# Patient Record
Sex: MALE | Race: BLACK OR AFRICAN AMERICAN | NOT HISPANIC OR LATINO | URBAN - METROPOLITAN AREA
[De-identification: names, ages, dates, MRNs, and addresses within clinical notes are randomized per-mention and may not be internally consistent; named-entity substitution may affect disease eponyms.]

---

## 2014-03-31 RX ORDER — TRAZODONE HCL 50 MG
1 TABLET ORAL
Qty: 0 | Refills: 0 | COMMUNITY
Start: 2014-03-31

## 2018-09-05 ENCOUNTER — EMERGENCY (EMERGENCY)
Facility: HOSPITAL | Age: 58
LOS: 1 days | Discharge: HOME | End: 2018-09-05
Attending: EMERGENCY MEDICINE
Payer: MEDICARE

## 2018-09-05 VITALS
TEMPERATURE: 98 F | HEIGHT: 75 IN | OXYGEN SATURATION: 100 % | HEART RATE: 87 BPM | DIASTOLIC BLOOD PRESSURE: 65 MMHG | WEIGHT: 190.04 LBS | SYSTOLIC BLOOD PRESSURE: 139 MMHG | RESPIRATION RATE: 18 BRPM

## 2018-09-05 VITALS
OXYGEN SATURATION: 100 % | HEART RATE: 62 BPM | SYSTOLIC BLOOD PRESSURE: 126 MMHG | DIASTOLIC BLOOD PRESSURE: 65 MMHG | RESPIRATION RATE: 16 BRPM

## 2018-09-05 DIAGNOSIS — F41.0 PANIC DISORDER [EPISODIC PAROXYSMAL ANXIETY]: ICD-10-CM

## 2018-09-05 DIAGNOSIS — Z21 ASYMPTOMATIC HUMAN IMMUNODEFICIENCY VIRUS [HIV] INFECTION STATUS: ICD-10-CM

## 2018-09-05 DIAGNOSIS — E11.9 TYPE 2 DIABETES MELLITUS WITHOUT COMPLICATIONS: ICD-10-CM

## 2018-09-05 DIAGNOSIS — R07.9 CHEST PAIN, UNSPECIFIED: ICD-10-CM

## 2018-09-05 DIAGNOSIS — F14.19 COCAINE ABUSE WITH UNSPECIFIED COCAINE-INDUCED DISORDER: ICD-10-CM

## 2018-09-05 DIAGNOSIS — F31.9 BIPOLAR DISORDER, UNSPECIFIED: ICD-10-CM

## 2018-09-05 DIAGNOSIS — I48.0 PAROXYSMAL ATRIAL FIBRILLATION: ICD-10-CM

## 2018-09-05 LAB
ALBUMIN SERPL ELPH-MCNC: 3.3 G/DL — SIGNIFICANT CHANGE UP (ref 3.3–5)
ALP SERPL-CCNC: 42 U/L — SIGNIFICANT CHANGE UP (ref 40–120)
ALT FLD-CCNC: 24 U/L — SIGNIFICANT CHANGE UP (ref 12–78)
ANION GAP SERPL CALC-SCNC: 10 MMOL/L — SIGNIFICANT CHANGE UP (ref 5–17)
APTT BLD: 27 SEC — LOW (ref 27.5–37.4)
AST SERPL-CCNC: 12 U/L — LOW (ref 15–37)
BASOPHILS # BLD AUTO: 0.02 K/UL — SIGNIFICANT CHANGE UP (ref 0–0.2)
BASOPHILS NFR BLD AUTO: 0.4 % — SIGNIFICANT CHANGE UP (ref 0–2)
BILIRUB SERPL-MCNC: 0.3 MG/DL — SIGNIFICANT CHANGE UP (ref 0.2–1.2)
BUN SERPL-MCNC: 18 MG/DL — SIGNIFICANT CHANGE UP (ref 7–23)
CALCIUM SERPL-MCNC: 9 MG/DL — SIGNIFICANT CHANGE UP (ref 8.5–10.1)
CHLORIDE SERPL-SCNC: 110 MMOL/L — HIGH (ref 96–108)
CK MB CFR SERPL CALC: 1.3 NG/ML — SIGNIFICANT CHANGE UP (ref 0.5–3.6)
CO2 SERPL-SCNC: 25 MMOL/L — SIGNIFICANT CHANGE UP (ref 22–31)
CREAT SERPL-MCNC: 0.8 MG/DL — SIGNIFICANT CHANGE UP (ref 0.5–1.3)
EOSINOPHIL # BLD AUTO: 0.01 K/UL — SIGNIFICANT CHANGE UP (ref 0–0.5)
EOSINOPHIL NFR BLD AUTO: 0.2 % — SIGNIFICANT CHANGE UP (ref 0–6)
ETHANOL SERPL-MCNC: <10 MG/DL — SIGNIFICANT CHANGE UP (ref 0–10)
GLUCOSE SERPL-MCNC: 92 MG/DL — SIGNIFICANT CHANGE UP (ref 70–99)
HCT VFR BLD CALC: 36 % — LOW (ref 39–50)
HGB BLD-MCNC: 12.2 G/DL — LOW (ref 13–17)
IMM GRANULOCYTES NFR BLD AUTO: 0.4 % — SIGNIFICANT CHANGE UP (ref 0–1.5)
INR BLD: 1.09 RATIO — SIGNIFICANT CHANGE UP (ref 0.88–1.16)
LYMPHOCYTES # BLD AUTO: 1.47 K/UL — SIGNIFICANT CHANGE UP (ref 1–3.3)
LYMPHOCYTES # BLD AUTO: 26.2 % — SIGNIFICANT CHANGE UP (ref 13–44)
MCHC RBC-ENTMCNC: 30 PG — SIGNIFICANT CHANGE UP (ref 27–34)
MCHC RBC-ENTMCNC: 33.9 GM/DL — SIGNIFICANT CHANGE UP (ref 32–36)
MCV RBC AUTO: 88.5 FL — SIGNIFICANT CHANGE UP (ref 80–100)
MONOCYTES # BLD AUTO: 0.69 K/UL — SIGNIFICANT CHANGE UP (ref 0–0.9)
MONOCYTES NFR BLD AUTO: 12.3 % — SIGNIFICANT CHANGE UP (ref 2–14)
NEUTROPHILS # BLD AUTO: 3.4 K/UL — SIGNIFICANT CHANGE UP (ref 1.8–7.4)
NEUTROPHILS NFR BLD AUTO: 60.5 % — SIGNIFICANT CHANGE UP (ref 43–77)
PCP SPEC-MCNC: SIGNIFICANT CHANGE UP
PLATELET # BLD AUTO: 191 K/UL — SIGNIFICANT CHANGE UP (ref 150–400)
POTASSIUM SERPL-MCNC: 3.8 MMOL/L — SIGNIFICANT CHANGE UP (ref 3.5–5.3)
POTASSIUM SERPL-SCNC: 3.8 MMOL/L — SIGNIFICANT CHANGE UP (ref 3.5–5.3)
PROT SERPL-MCNC: 7.1 GM/DL — SIGNIFICANT CHANGE UP (ref 6–8.3)
PROTHROM AB SERPL-ACNC: 11.9 SEC — SIGNIFICANT CHANGE UP (ref 9.8–12.7)
RBC # BLD: 4.07 M/UL — LOW (ref 4.2–5.8)
RBC # FLD: 16.1 % — HIGH (ref 10.3–14.5)
SODIUM SERPL-SCNC: 145 MMOL/L — SIGNIFICANT CHANGE UP (ref 135–145)
TROPONIN I SERPL-MCNC: <.015 NG/ML — SIGNIFICANT CHANGE UP (ref 0.01–0.04)
TROPONIN I SERPL-MCNC: <.015 NG/ML — SIGNIFICANT CHANGE UP (ref 0.01–0.04)
WBC # BLD: 5.61 K/UL — SIGNIFICANT CHANGE UP (ref 3.8–10.5)
WBC # FLD AUTO: 5.61 K/UL — SIGNIFICANT CHANGE UP (ref 3.8–10.5)

## 2018-09-05 PROCEDURE — 99285 EMERGENCY DEPT VISIT HI MDM: CPT

## 2018-09-05 PROCEDURE — 71045 X-RAY EXAM CHEST 1 VIEW: CPT | Mod: 26

## 2018-09-05 PROCEDURE — 93010 ELECTROCARDIOGRAM REPORT: CPT

## 2018-09-05 RX ORDER — IPRATROPIUM/ALBUTEROL SULFATE 18-103MCG
3 AEROSOL WITH ADAPTER (GRAM) INHALATION ONCE
Qty: 0 | Refills: 0 | Status: COMPLETED | OUTPATIENT
Start: 2018-09-05 | End: 2018-09-05

## 2018-09-05 RX ADMIN — Medication 1 MILLIGRAM(S): at 03:39

## 2018-09-05 RX ADMIN — Medication 3 MILLILITER(S): at 03:39

## 2018-09-05 NOTE — ED ADULT NURSE REASSESSMENT NOTE - NS ED NURSE REASSESS COMMENT FT1
Patient remains stable while in the ED, pending repeat troponin results. Denies any cp/sob at the moment. VS wnl.
Patient remains stable while in ED, sleeping in strtecher and hooked on CM. VS wnl. Repeat tropes in 6am.
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
## 2018-09-05 NOTE — ED PROVIDER NOTE - MEDICAL DECISION MAKING DETAILS
smoked crack, had some cp/sob. comfortable appearing with ekg showing only rbbb. mild paranoia but baseline per aunt. no si/hi/ah or threat to self. ekg, CE, benzos, reassess.

## 2018-09-05 NOTE — ED ADULT NURSE NOTE - NSIMPLEMENTINTERV_GEN_ALL_ED
Implemented All Universal Safety Interventions:  [place] to call system. Call bell, personal items and telephone within reach. Instruct patient to call for assistance. Room bathroom lighting operational. Non-slip footwear when patient is off stretcher. Physically safe environment: no spills, clutter or unnecessary equipment. Stretcher in lowest position, wheels locked, appropriate side rails in place.

## 2018-09-05 NOTE — ED PROVIDER NOTE - PHYSICAL EXAMINATION
Gen: No acute distress, non toxic  HEENT: Mucous membranes moist, pink conjunctivae, EOMI  CV: RRR, nl s1/s2. equal  pulses b/l  Resp: CTAB, normal rate and effort  GI: Abdomen soft, NT, ND. No rebound, no guarding  : No CVAT  Neuro: A&O x 3, moving all 4 extremities  MSK: No spine or joint tenderness to palpation  Skin: No rashes. intact and perfused.

## 2018-09-05 NOTE — ED PROVIDER NOTE - PROGRESS NOTE DETAILS
Mackenzie: pt sleeping comfortably. Mackenzie: woke pt up as was sleeping comfortably, CE x2, neg, feeling better,labs wnl. given return precautions and instructions. stable for d/c. VS wnl.
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
## 2018-09-05 NOTE — ED ADULT NURSE NOTE - PMH
Allergy    Bipolar 1 disorder    DM (diabetes mellitus)    HIV disease    PAF (paroxysmal atrial fibrillation)

## 2018-09-23 ENCOUNTER — EMERGENCY (EMERGENCY)
Facility: HOSPITAL | Age: 58
LOS: 1 days | Discharge: PSYCHIATRIC OR SUBSTANCE USE FACILITY | End: 2018-09-23
Admitting: EMERGENCY MEDICINE
Payer: MEDICARE

## 2018-09-23 VITALS
RESPIRATION RATE: 20 BRPM | SYSTOLIC BLOOD PRESSURE: 144 MMHG | HEART RATE: 93 BPM | OXYGEN SATURATION: 98 % | TEMPERATURE: 98 F | DIASTOLIC BLOOD PRESSURE: 96 MMHG

## 2018-09-23 DIAGNOSIS — F14.21 COCAINE DEPENDENCE, IN REMISSION: ICD-10-CM

## 2018-09-23 DIAGNOSIS — R69 ILLNESS, UNSPECIFIED: ICD-10-CM

## 2018-09-23 DIAGNOSIS — F25.9 SCHIZOAFFECTIVE DISORDER, UNSPECIFIED: ICD-10-CM

## 2018-09-23 LAB
ALBUMIN SERPL ELPH-MCNC: 4.1 G/DL — SIGNIFICANT CHANGE UP (ref 3.3–5)
ALP SERPL-CCNC: 52 U/L — SIGNIFICANT CHANGE UP (ref 40–120)
ALT FLD-CCNC: 18 U/L — SIGNIFICANT CHANGE UP (ref 4–41)
AMPHET UR-MCNC: NEGATIVE — SIGNIFICANT CHANGE UP
APAP SERPL-MCNC: < 15 UG/ML — LOW (ref 15–25)
APPEARANCE UR: CLEAR — SIGNIFICANT CHANGE UP
AST SERPL-CCNC: 16 U/L — SIGNIFICANT CHANGE UP (ref 4–40)
BACTERIA # UR AUTO: HIGH
BARBITURATES UR SCN-MCNC: NEGATIVE — SIGNIFICANT CHANGE UP
BASOPHILS # BLD AUTO: 0.01 K/UL — SIGNIFICANT CHANGE UP (ref 0–0.2)
BASOPHILS NFR BLD AUTO: 0.2 % — SIGNIFICANT CHANGE UP (ref 0–2)
BENZODIAZ UR-MCNC: POSITIVE — SIGNIFICANT CHANGE UP
BILIRUB SERPL-MCNC: 0.3 MG/DL — SIGNIFICANT CHANGE UP (ref 0.2–1.2)
BILIRUB UR-MCNC: NEGATIVE — SIGNIFICANT CHANGE UP
BLOOD UR QL VISUAL: NEGATIVE — SIGNIFICANT CHANGE UP
BUN SERPL-MCNC: 13 MG/DL — SIGNIFICANT CHANGE UP (ref 7–23)
CALCIUM SERPL-MCNC: 9.5 MG/DL — SIGNIFICANT CHANGE UP (ref 8.4–10.5)
CANNABINOIDS UR-MCNC: NEGATIVE — SIGNIFICANT CHANGE UP
CHLORIDE SERPL-SCNC: 99 MMOL/L — SIGNIFICANT CHANGE UP (ref 98–107)
CO2 SERPL-SCNC: 26 MMOL/L — SIGNIFICANT CHANGE UP (ref 22–31)
COCAINE METAB.OTHER UR-MCNC: NEGATIVE — SIGNIFICANT CHANGE UP
COLOR SPEC: YELLOW — SIGNIFICANT CHANGE UP
CREAT SERPL-MCNC: 0.79 MG/DL — SIGNIFICANT CHANGE UP (ref 0.5–1.3)
EOSINOPHIL # BLD AUTO: 0.01 K/UL — SIGNIFICANT CHANGE UP (ref 0–0.5)
EOSINOPHIL NFR BLD AUTO: 0.2 % — SIGNIFICANT CHANGE UP (ref 0–6)
ETHANOL BLD-MCNC: < 10 MG/DL — SIGNIFICANT CHANGE UP
GLUCOSE SERPL-MCNC: 101 MG/DL — HIGH (ref 70–99)
GLUCOSE UR-MCNC: NEGATIVE — SIGNIFICANT CHANGE UP
HCT VFR BLD CALC: 38.2 % — LOW (ref 39–50)
HGB BLD-MCNC: 13 G/DL — SIGNIFICANT CHANGE UP (ref 13–17)
HYALINE CASTS # UR AUTO: SIGNIFICANT CHANGE UP
IMM GRANULOCYTES # BLD AUTO: 0.01 # — SIGNIFICANT CHANGE UP
IMM GRANULOCYTES NFR BLD AUTO: 0.2 % — SIGNIFICANT CHANGE UP (ref 0–1.5)
KETONES UR-MCNC: NEGATIVE — SIGNIFICANT CHANGE UP
LEUKOCYTE ESTERASE UR-ACNC: SIGNIFICANT CHANGE UP
LYMPHOCYTES # BLD AUTO: 1.24 K/UL — SIGNIFICANT CHANGE UP (ref 1–3.3)
LYMPHOCYTES # BLD AUTO: 27.6 % — SIGNIFICANT CHANGE UP (ref 13–44)
MCHC RBC-ENTMCNC: 30 PG — SIGNIFICANT CHANGE UP (ref 27–34)
MCHC RBC-ENTMCNC: 34 % — SIGNIFICANT CHANGE UP (ref 32–36)
MCV RBC AUTO: 88.2 FL — SIGNIFICANT CHANGE UP (ref 80–100)
METHADONE UR-MCNC: NEGATIVE — SIGNIFICANT CHANGE UP
MONOCYTES # BLD AUTO: 0.39 K/UL — SIGNIFICANT CHANGE UP (ref 0–0.9)
MONOCYTES NFR BLD AUTO: 8.7 % — SIGNIFICANT CHANGE UP (ref 2–14)
NEUTROPHILS # BLD AUTO: 2.83 K/UL — SIGNIFICANT CHANGE UP (ref 1.8–7.4)
NEUTROPHILS NFR BLD AUTO: 63.1 % — SIGNIFICANT CHANGE UP (ref 43–77)
NITRITE UR-MCNC: POSITIVE — HIGH
NRBC # FLD: 0 — SIGNIFICANT CHANGE UP
OPIATES UR-MCNC: NEGATIVE — SIGNIFICANT CHANGE UP
OXYCODONE UR-MCNC: NEGATIVE — SIGNIFICANT CHANGE UP
PCP UR-MCNC: NEGATIVE — SIGNIFICANT CHANGE UP
PH UR: 6.5 — SIGNIFICANT CHANGE UP (ref 5–8)
PLATELET # BLD AUTO: 140 K/UL — LOW (ref 150–400)
PMV BLD: 10.8 FL — SIGNIFICANT CHANGE UP (ref 7–13)
POTASSIUM SERPL-MCNC: 4 MMOL/L — SIGNIFICANT CHANGE UP (ref 3.5–5.3)
POTASSIUM SERPL-SCNC: 4 MMOL/L — SIGNIFICANT CHANGE UP (ref 3.5–5.3)
PROT SERPL-MCNC: 7.7 G/DL — SIGNIFICANT CHANGE UP (ref 6–8.3)
PROT UR-MCNC: 10 — SIGNIFICANT CHANGE UP
RBC # BLD: 4.33 M/UL — SIGNIFICANT CHANGE UP (ref 4.2–5.8)
RBC # FLD: 15.1 % — HIGH (ref 10.3–14.5)
RBC CASTS # UR COMP ASSIST: SIGNIFICANT CHANGE UP (ref 0–?)
SALICYLATES SERPL-MCNC: < 5 MG/DL — LOW (ref 15–30)
SODIUM SERPL-SCNC: 138 MMOL/L — SIGNIFICANT CHANGE UP (ref 135–145)
SP GR SPEC: 1.02 — SIGNIFICANT CHANGE UP (ref 1–1.04)
SQUAMOUS # UR AUTO: SIGNIFICANT CHANGE UP
TSH SERPL-MCNC: 1.71 UIU/ML — SIGNIFICANT CHANGE UP (ref 0.27–4.2)
UROBILINOGEN FLD QL: NORMAL — SIGNIFICANT CHANGE UP
WBC # BLD: 4.49 K/UL — SIGNIFICANT CHANGE UP (ref 3.8–10.5)
WBC # FLD AUTO: 4.49 K/UL — SIGNIFICANT CHANGE UP (ref 3.8–10.5)
WBC UR QL: HIGH (ref 0–?)

## 2018-09-23 PROCEDURE — 70450 CT HEAD/BRAIN W/O DYE: CPT | Mod: 26

## 2018-09-23 PROCEDURE — 99285 EMERGENCY DEPT VISIT HI MDM: CPT | Mod: 25

## 2018-09-23 PROCEDURE — 93010 ELECTROCARDIOGRAM REPORT: CPT

## 2018-09-23 PROCEDURE — 99285 EMERGENCY DEPT VISIT HI MDM: CPT

## 2018-09-23 RX ORDER — CEPHALEXIN 500 MG
500 CAPSULE ORAL ONCE
Qty: 0 | Refills: 0 | Status: COMPLETED | OUTPATIENT
Start: 2018-09-23 | End: 2018-09-23

## 2018-09-23 RX ORDER — DIAZEPAM 5 MG
5 TABLET ORAL ONCE
Qty: 0 | Refills: 0 | Status: DISCONTINUED | OUTPATIENT
Start: 2018-09-23 | End: 2018-09-23

## 2018-09-23 RX ADMIN — Medication 500 MILLIGRAM(S): at 23:32

## 2018-09-23 RX ADMIN — Medication 5 MILLIGRAM(S): at 22:53

## 2018-09-23 NOTE — ED PROVIDER NOTE - MEDICAL DECISION MAKING DETAILS
56 y/o  M hx Paranoid Schizophrenia, HIV (20 years)   Labs, Urine Tox/UA, EKG, CT -Head, T Cell Subset. Leuks plus nitrite and excess WBC on  UA. Dx UTI- Will cover with Keflex 500 mg BID x 10 days .  Medical evaluation performed. There is no clinical evidence of intoxication or any acute medical problem requiring immediate intervention. Psychiatric consultation called, recommend inpatient psychiatric admission. 58 y/o  M hx Paranoid Schizophrenia, HIV (20 years),HLD,HTN, DM, A.Fib.    Labs, Urine Tox/UA, EKG - NSR-  CT -Head, T Cell Subset. Leuks plus nitrite and excess WBC on  UA.   Dx -UTI- Will cover with Keflex 500 mg BID x 10 days .  Medical evaluation performed. There is no clinical evidence of intoxication or any acute medical problem requiring immediate intervention. Psychiatric consultation called, recommend inpatient psychiatric admission.

## 2018-09-23 NOTE — ED BEHAVIORAL HEALTH ASSESSMENT NOTE - DETAILS
20 yrs ago tried to self strangulate possible dyskinesia from risperidone and other AP's Dr. Gan pt,
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
## 2018-09-23 NOTE — ED PROVIDER NOTE - PMH
Deep vein thrombosis of both lower extremities    Paranoid schizophrenia Deep vein thrombosis of both lower extremities    HIV (human immunodeficiency virus infection)    Paranoid schizophrenia Afib    Deep vein thrombosis of both lower extremities    DM (diabetes mellitus)    HIV (human immunodeficiency virus infection)    HLD (hyperlipidemia)    HTN (hypertension)    Paranoid schizophrenia

## 2018-09-23 NOTE — ED PROVIDER NOTE - OBJECTIVE STATEMENT
58 y/o  M hx Paranoid Schizophrenia, HIV (20 years) BIBA w c/o that he's has concerns that his family want to kill him. States  ' I heard them saying that no blood should be in the house, so we will kill hematoside" . Patient  appears paranoid  with flight of ideas. Admits to medication compliance. Denies falling, punching or kicking any objects.  Denies SI/HI/AH/VH. Denies pain, dizziness, SOB, fever, chills ,chest/ abdominal discomfort. Denies recent use of  alcohol. 56 y/o  M hx Paranoid Schizophrenia, HIV (20 years), HLD,HTN, DM, A.Fib. BIBA w c/o that he's has concerns that his family want to kill him. States  ' I heard them saying that no blood should be in the house, so we will kill hIm Outside" . Admits to medication compliance.  Patient  appears paranoid  with flight of ideas. Admits to medication compliance. Denies falling, punching or kicking any objects.  Denies SI/HI/AH/VH. Denies pain, dizziness, SOB, fever, chills ,chest/ abdominal discomfort. Denies recent use of  alcohol. 56 y/o  M hx Paranoid Schizophrenia, HIV (20 years), HLD,HTN, DM, A.Fib. BIBA w c/o that he's has concerns that his family want to kill him. States  ' I heard them saying that no blood should be in the house, so we will kill hIm Outside" . Admits to medication compliance.  Patient  appears paranoid  with flight of ideas. Admits to medication compliance. Denies falling, punching or kicking any objects.  Denies SI/HI/AH/VH. Denies pain, dizziness, SOB, fever, chills ,chest/ abdominal discomfort. Denies recent use of  alcohol or illicit drugs. 58 y/o  M hx Paranoid Schizophrenia, HIV (20 years), HLD,HTN, DM, A.Fib. BIBA w c/o that he's has concerns that his family want to kill him. States  ' I heard them saying that no blood should be in the house, so we will kill him Outside" . Admits to medication compliance.  Patient  appears paranoid  with flight of ideas. Admits to medication compliance. Denies falling, punching or kicking any objects.  Denies SI/HI/AH/VH. Denies pain, dizziness, SOB, fever, chills ,chest/ abdominal discomfort. Denies recent use of  alcohol or illicit drugs.
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
## 2018-09-23 NOTE — ED PROVIDER NOTE - CARE PLAN
Principal Discharge DX:	Schizoaffective disorder  Secondary Diagnosis:	Cocaine use disorder, moderate, in sustained remission Principal Discharge DX:	Schizoaffective disorder  Secondary Diagnosis:	Cocaine use disorder, moderate, in sustained remission  Secondary Diagnosis:	UTI (urinary tract infection)

## 2018-09-23 NOTE — ED BEHAVIORAL HEALTH ASSESSMENT NOTE - RISK ASSESSMENT
Patient is acute risk to self given high levels of anxiety, psychosis, unable to engage in safe discharge planning, He poses and imminent danger to self as he is unable to care for self secondary to acute psychosis. Pt has chronic risks given chronic mental illness, past psych hospitalizations, past SA, and HIV and acute risk is currently elevated from baseline. He requires inpatient psychiatric admission for treatment and stabilization.

## 2018-09-23 NOTE — ED BEHAVIORAL HEALTH ASSESSMENT NOTE - CURRENT MEDICATION
Abilify 10mg qd, diazepam 5mg tid, Depakote ER 750mg qhs, trazodone 50mg prn  HIV- Genvoya 150mg qd, HLD-lipitor 10mg hs, DM2 : metoformin 500mg bid, HTN metoprolol 25mg qd, DVT prophylaxis-aspirin 81mg, plavix, oxybutinin 10mg qd, Tamulosin 0.4mg qd, gabapentin 600mg tid

## 2018-09-23 NOTE — ED BEHAVIORAL HEALTH ASSESSMENT NOTE - SUMMARY
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 Robles [facility]; remote hx of attempted self strangulation; PMH HIV, DVT, atrial fibrillation, DM2, HTN, HLD BIB EMS after he [place] [employer] [address] as he was afraid that family members are coming to kill him.  On exam, pt is cooperative, makes good eye contact, he is highly anxious, though processes are linear and goal directed, he endorses persecutory delusions that his family is trying to kill him, he also endorses delusions of carbon monoxide poisoning while at work last week. Patient is unable to safety plan. Dx in keeping with acute psychosis, schizoaffective disorder. Patient is acute risk to self given high levels of anxiety, psychosis, unable to engage in safe discharge planning, He poses and imminent danger to self as he is unable to care for self secondary to acute psychosis. Pt has chronic risks given chronic mental illness, past psych hospitalizations, past SA, and HIV and acute risk is currently elevated from baseline. He requires inpatient psychiatric admission for treatment and stabilization. Pt made aware  no beds in [facility], will transfer to [facility] on 9.13. No need for 1:1 CO as denies active suicidal or homicidal intent. 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 Robles [facility]; remote hx of attempted self strangulation; PMH HIV, DVT, atrial fibrillation, DM2, HTN, HLD BIB EMS after he activated [place] [address] as he was afraid that family members are coming to kill him.  On exam, pt is cooperative, makes good eye contact, he is highly anxious, though processes are linear and goal directed, he endorses persecutory delusions that his family is trying to kill him, he also endorses delusions of carbon monoxide poisoning while at work last week. Patient is unable to safety plan. Dx in keeping with acute psychosis, schizoaffective disorder. UTOX is negative. Patient is acute risk to self given high levels of anxiety, psychosis, unable to engage in safe discharge planning, He poses and imminent danger to self as he is unable to care for self secondary to acute psychosis. Pt has chronic risks given chronic mental illness, past psych hospitalizations, past SA, and HIV and acute risk is currently elevated from baseline. He requires inpatient psychiatric admission for treatment and stabilization. Pt made aware  no beds in [facility], will transfer to [facility] on 9.13. No need for 1:1 CO as denies active suicidal or homicidal intent.
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
## 2018-09-23 NOTE — ED ADULT NURSE NOTE - NSIMPLEMENTINTERV_GEN_ALL_ED
Implemented All Universal Safety Interventions:  [place] to call system. Call bell, personal items and telephone within reach. Instruct patient to call for assistance. Room bathroom lighting operational. Non-slip footwear when patient is off stretcher. Physically safe environment: no spills, clutter or unnecessary equipment. Stretcher in lowest position, wheels locked, appropriate side rails in place.

## 2018-09-23 NOTE — ED BEHAVIORAL HEALTH ASSESSMENT NOTE - PSYCHIATRIC ISSUES AND PLAN (INCLUDE STANDING AND PRN MEDICATION)
continue Abilify 10mg qd, (primary team to consider increase dose), diazepam 5mg tid, Depakote ER 750mg qhs, trazodone 50mg prn, prn's

## 2018-09-23 NOTE — ED ADULT NURSE NOTE - ED STAT RN HANDOFF DETAILS
Report given to [address] MELLISA Velazco.  EMS called for transport.  EMS arrived and transporting pt.

## 2018-09-23 NOTE — ED BEHAVIORAL HEALTH ASSESSMENT NOTE - MEDICAL ISSUES AND PLAN (INCLUDE STANDING AND PRN MEDICATION)
HIV- Genvoya 150mg qd, HLD-lipitor 10mg hs, DM2 : metoformin 500mg bid, HTN metoprolol 25mg qd, DVT prophylaxis-aspirin 81mg, plavix, oxybutinin 10mg qd, Tamulosin 0.4mg qd, gabapentin 600mg tid UTI diagnosed today in ED-KEFLEX 500MG BID X 10 DAYS, HIV- Genvoya 150mg qd, HLD-lipitor 10mg hs, DM2 : metoformin 500mg bid, HTN metoprolol 25mg qd, DVT prophylaxis-aspirin 81mg, plavix, oxybutinin 10mg qd, Tamulosin 0.4mg qd, gabapentin 600mg tid

## 2018-09-23 NOTE — ED BEHAVIORAL HEALTH ASSESSMENT NOTE - SUICIDE PROTECTIVE FACTORS
Future oriented/Fear of death or dying due to pain/suffering/High spirituality/Identifies reasons for living

## 2018-09-23 NOTE — ED BEHAVIORAL HEALTH ASSESSMENT NOTE - DESCRIPTION
cooperative, anxious  ICU Vital Signs Last 24 Hrs  T(C): 36.7 (23 Sep 2018 20:33), Max: 36.7 (23 Sep 2018 20:33)  T(F): 98.1 (23 Sep 2018 20:33), Max: 98.1 (23 Sep 2018 20:33)  HR: 93 (23 Sep 2018 20:33) (93 - 93)  BP: 144/96 (23 Sep 2018 20:33) (144/96 - 144/96)  BP(mean): --  ABP: --  ABP(mean): --  RR: 20 (23 Sep 2018 20:33) (20 - 20)  SpO2: 98% (23 Sep 2018 20:33) (98% - 98%) HIV, HTN, HLD, DVT'S Afib part-time employed, will be getting his own apartment through [facility] on 10/4

## 2018-09-24 ENCOUNTER — INPATIENT (INPATIENT)
Facility: HOSPITAL | Age: 58
LOS: 34 days | Discharge: HOME | End: 2018-10-29
Attending: PSYCHIATRY & NEUROLOGY | Admitting: PSYCHIATRY & NEUROLOGY
Payer: MEDICARE

## 2018-09-24 VITALS
WEIGHT: 164.24 LBS | SYSTOLIC BLOOD PRESSURE: 111 MMHG | DIASTOLIC BLOOD PRESSURE: 83 MMHG | HEIGHT: 75 IN | TEMPERATURE: 97 F | RESPIRATION RATE: 18 BRPM | HEART RATE: 83 BPM

## 2018-09-24 VITALS
OXYGEN SATURATION: 100 % | TEMPERATURE: 99 F | SYSTOLIC BLOOD PRESSURE: 118 MMHG | DIASTOLIC BLOOD PRESSURE: 75 MMHG | HEART RATE: 79 BPM | RESPIRATION RATE: 16 BRPM

## 2018-09-24 DIAGNOSIS — E11.9 TYPE 2 DIABETES MELLITUS WITHOUT COMPLICATIONS: ICD-10-CM

## 2018-09-24 DIAGNOSIS — B20 HUMAN IMMUNODEFICIENCY VIRUS [HIV] DISEASE: ICD-10-CM

## 2018-09-24 DIAGNOSIS — F31.9 BIPOLAR DISORDER, UNSPECIFIED: ICD-10-CM

## 2018-09-24 DIAGNOSIS — F20.9 SCHIZOPHRENIA, UNSPECIFIED: ICD-10-CM

## 2018-09-24 DIAGNOSIS — F14.21 COCAINE DEPENDENCE, IN REMISSION: ICD-10-CM

## 2018-09-24 DIAGNOSIS — F25.9 SCHIZOAFFECTIVE DISORDER, UNSPECIFIED: ICD-10-CM

## 2018-09-24 LAB
4/8 RATIO: 1.57 CELLS/UL — LOW (ref 1.69–2.84)
ABS CD8: 309 CELLS/UL — SIGNIFICANT CHANGE UP (ref 291–576)
CD16+CD56+ CELLS NFR BLD: 10 % — SIGNIFICANT CHANGE UP (ref 6–22)
CD16+CD56+ CELLS NFR SPEC: 117 CELL/UL — SIGNIFICANT CHANGE UP (ref 59–453)
CD19 BLASTS SPEC-ACNC: 17 % — SIGNIFICANT CHANGE UP (ref 8–22)
CD19 BLASTS SPEC-ACNC: 189 CELL/UL — SIGNIFICANT CHANGE UP (ref 105–430)
CD3 BLASTS SPEC-ACNC: 72 % — SIGNIFICANT CHANGE UP (ref 62–83)
CD3 BLASTS SPEC-ACNC: 821 CELLS/UL — SIGNIFICANT CHANGE UP (ref 678–2144)
CD4 %: 43 % — SIGNIFICANT CHANGE UP (ref 43–52)
CD8 %: 27 % — SIGNIFICANT CHANGE UP (ref 17–28)
T-CELL CD4 SUBSET PNL BLD: 484 CELL/UL — SIGNIFICANT CHANGE UP (ref 431–1434)

## 2018-09-24 RX ORDER — VALACYCLOVIR 500 MG/1
1000 TABLET, FILM COATED ORAL DAILY
Qty: 0 | Refills: 0 | Status: COMPLETED | OUTPATIENT
Start: 2018-09-24 | End: 2018-10-24

## 2018-09-24 RX ORDER — OXYBUTYNIN CHLORIDE 5 MG
10 TABLET ORAL DAILY
Qty: 0 | Refills: 0 | Status: DISCONTINUED | OUTPATIENT
Start: 2018-09-24 | End: 2018-09-24

## 2018-09-24 RX ORDER — METFORMIN HYDROCHLORIDE 850 MG/1
500 TABLET ORAL
Qty: 0 | Refills: 0 | Status: DISCONTINUED | OUTPATIENT
Start: 2018-09-24 | End: 2018-10-29

## 2018-09-24 RX ORDER — GABAPENTIN 400 MG/1
600 CAPSULE ORAL THREE TIMES A DAY
Qty: 0 | Refills: 0 | Status: DISCONTINUED | OUTPATIENT
Start: 2018-09-24 | End: 2018-10-29

## 2018-09-24 RX ORDER — DIVALPROEX SODIUM 500 MG/1
750 TABLET, DELAYED RELEASE ORAL AT BEDTIME
Qty: 0 | Refills: 0 | Status: DISCONTINUED | OUTPATIENT
Start: 2018-09-24 | End: 2018-10-03

## 2018-09-24 RX ORDER — TAMSULOSIN HYDROCHLORIDE 0.4 MG/1
0.4 CAPSULE ORAL AT BEDTIME
Qty: 0 | Refills: 0 | Status: DISCONTINUED | OUTPATIENT
Start: 2018-09-24 | End: 2018-10-29

## 2018-09-24 RX ORDER — KETOTIFEN FUMARATE 0.34 MG/ML
1 SOLUTION OPHTHALMIC AT BEDTIME
Qty: 0 | Refills: 0 | Status: DISCONTINUED | OUTPATIENT
Start: 2018-09-24 | End: 2018-10-29

## 2018-09-24 RX ORDER — DOCUSATE SODIUM 100 MG
100 CAPSULE ORAL
Qty: 0 | Refills: 0 | Status: COMPLETED | OUTPATIENT
Start: 2018-09-24 | End: 2018-10-24

## 2018-09-24 RX ORDER — ASPIRIN/CALCIUM CARB/MAGNESIUM 324 MG
81 TABLET ORAL DAILY
Qty: 0 | Refills: 0 | Status: DISCONTINUED | OUTPATIENT
Start: 2018-09-24 | End: 2018-10-29

## 2018-09-24 RX ORDER — HALOPERIDOL DECANOATE 100 MG/ML
5 INJECTION INTRAMUSCULAR EVERY 6 HOURS
Qty: 0 | Refills: 0 | Status: DISCONTINUED | OUTPATIENT
Start: 2018-09-24 | End: 2018-10-29

## 2018-09-24 RX ORDER — PANTOPRAZOLE SODIUM 20 MG/1
40 TABLET, DELAYED RELEASE ORAL
Qty: 0 | Refills: 0 | Status: COMPLETED | OUTPATIENT
Start: 2018-09-24 | End: 2018-10-24

## 2018-09-24 RX ORDER — DIAZEPAM 5 MG
5 TABLET ORAL THREE TIMES A DAY
Qty: 0 | Refills: 0 | Status: DISCONTINUED | OUTPATIENT
Start: 2018-09-24 | End: 2018-09-28

## 2018-09-24 RX ORDER — INFLUENZA VIRUS VACCINE 15; 15; 15; 15 UG/.5ML; UG/.5ML; UG/.5ML; UG/.5ML
0.5 SUSPENSION INTRAMUSCULAR ONCE
Qty: 0 | Refills: 0 | Status: COMPLETED | OUTPATIENT
Start: 2018-09-24 | End: 2018-10-08

## 2018-09-24 RX ORDER — DILTIAZEM HCL 120 MG
120 CAPSULE, EXT RELEASE 24 HR ORAL DAILY
Qty: 0 | Refills: 0 | Status: COMPLETED | OUTPATIENT
Start: 2018-09-24 | End: 2018-10-24

## 2018-09-24 RX ORDER — MONTELUKAST 4 MG/1
10 TABLET, CHEWABLE ORAL DAILY
Qty: 0 | Refills: 0 | Status: COMPLETED | OUTPATIENT
Start: 2018-09-24 | End: 2018-10-24

## 2018-09-24 RX ORDER — IPRATROPIUM BROMIDE 0.2 MG/ML
1 SOLUTION, NON-ORAL INHALATION EVERY 6 HOURS
Qty: 0 | Refills: 0 | Status: DISCONTINUED | OUTPATIENT
Start: 2018-09-24 | End: 2018-10-29

## 2018-09-24 RX ORDER — ACETAMINOPHEN 500 MG
650 TABLET ORAL EVERY 6 HOURS
Qty: 0 | Refills: 0 | Status: DISCONTINUED | OUTPATIENT
Start: 2018-09-24 | End: 2018-10-29

## 2018-09-24 RX ORDER — OXYBUTYNIN CHLORIDE 5 MG
5 TABLET ORAL
Qty: 0 | Refills: 0 | Status: DISCONTINUED | OUTPATIENT
Start: 2018-09-24 | End: 2018-10-29

## 2018-09-24 RX ORDER — HYDROCORTISONE 1 %
1 OINTMENT (GRAM) TOPICAL DAILY
Qty: 0 | Refills: 0 | Status: COMPLETED | OUTPATIENT
Start: 2018-09-24 | End: 2018-10-24

## 2018-09-24 RX ORDER — ATORVASTATIN CALCIUM 80 MG/1
10 TABLET, FILM COATED ORAL AT BEDTIME
Qty: 0 | Refills: 0 | Status: DISCONTINUED | OUTPATIENT
Start: 2018-09-24 | End: 2018-10-29

## 2018-09-24 RX ORDER — METFORMIN HYDROCHLORIDE 850 MG/1
1 TABLET ORAL
Qty: 0 | Refills: 0 | COMMUNITY

## 2018-09-24 RX ORDER — BUDESONIDE AND FORMOTEROL FUMARATE DIHYDRATE 160; 4.5 UG/1; UG/1
2 AEROSOL RESPIRATORY (INHALATION)
Qty: 0 | Refills: 0 | Status: DISCONTINUED | OUTPATIENT
Start: 2018-09-24 | End: 2018-10-29

## 2018-09-24 RX ORDER — ELVITEGRAVIR, COBICISTAT, EMTRICITABINE, AND TENOFOVIR ALAFENAMIDE 150; 150; 200; 10 MG/1; MG/1; MG/1; MG/1
1 TABLET ORAL DAILY
Qty: 0 | Refills: 0 | Status: DISCONTINUED | OUTPATIENT
Start: 2018-09-24 | End: 2018-10-29

## 2018-09-24 RX ORDER — ARIPIPRAZOLE 15 MG/1
10 TABLET ORAL DAILY
Qty: 0 | Refills: 0 | Status: DISCONTINUED | OUTPATIENT
Start: 2018-09-24 | End: 2018-09-28

## 2018-09-24 RX ORDER — SODIUM CHLORIDE 0.65 %
1 AEROSOL, SPRAY (ML) NASAL
Qty: 0 | Refills: 0 | Status: DISCONTINUED | OUTPATIENT
Start: 2018-09-24 | End: 2018-10-29

## 2018-09-24 RX ORDER — CLOPIDOGREL BISULFATE 75 MG/1
75 TABLET, FILM COATED ORAL DAILY
Qty: 0 | Refills: 0 | Status: DISCONTINUED | OUTPATIENT
Start: 2018-09-24 | End: 2018-10-29

## 2018-09-24 RX ORDER — CEPHALEXIN 500 MG
500 CAPSULE ORAL EVERY 12 HOURS
Qty: 0 | Refills: 0 | Status: COMPLETED | OUTPATIENT
Start: 2018-09-24 | End: 2018-10-04

## 2018-09-24 RX ORDER — TRAZODONE HCL 50 MG
50 TABLET ORAL AT BEDTIME
Qty: 0 | Refills: 0 | Status: DISCONTINUED | OUTPATIENT
Start: 2018-09-24 | End: 2018-10-29

## 2018-09-24 RX ORDER — ALBUTEROL 90 UG/1
1 AEROSOL, METERED ORAL EVERY 4 HOURS
Qty: 0 | Refills: 0 | Status: DISCONTINUED | OUTPATIENT
Start: 2018-09-24 | End: 2018-10-29

## 2018-09-24 RX ORDER — METOPROLOL TARTRATE 50 MG
25 TABLET ORAL DAILY
Qty: 0 | Refills: 0 | Status: DISCONTINUED | OUTPATIENT
Start: 2018-09-24 | End: 2018-09-28

## 2018-09-24 RX ORDER — ALBUTEROL 90 UG/1
1 AEROSOL, METERED ORAL
Qty: 0 | Refills: 0 | COMMUNITY

## 2018-09-24 RX ORDER — FLUTICASONE PROPIONATE 50 MCG
1 SPRAY, SUSPENSION NASAL DAILY
Qty: 0 | Refills: 0 | Status: COMPLETED | OUTPATIENT
Start: 2018-09-24 | End: 2018-10-24

## 2018-09-24 RX ADMIN — CLOPIDOGREL BISULFATE 75 MILLIGRAM(S): 75 TABLET, FILM COATED ORAL at 14:19

## 2018-09-24 RX ADMIN — Medication 5 MILLIGRAM(S): at 20:35

## 2018-09-24 RX ADMIN — Medication 2 MILLIGRAM(S): at 17:08

## 2018-09-24 RX ADMIN — Medication 5 MILLIGRAM(S): at 20:40

## 2018-09-24 RX ADMIN — Medication 81 MILLIGRAM(S): at 14:19

## 2018-09-24 RX ADMIN — Medication 1 PUFF(S): at 20:43

## 2018-09-24 RX ADMIN — Medication 500 MILLIGRAM(S): at 20:36

## 2018-09-24 RX ADMIN — METFORMIN HYDROCHLORIDE 500 MILLIGRAM(S): 850 TABLET ORAL at 20:35

## 2018-09-24 RX ADMIN — Medication 25 MILLIGRAM(S): at 14:19

## 2018-09-24 RX ADMIN — Medication 100 MILLIGRAM(S): at 20:36

## 2018-09-24 RX ADMIN — GABAPENTIN 600 MILLIGRAM(S): 400 CAPSULE ORAL at 14:19

## 2018-09-24 RX ADMIN — Medication 5 MILLIGRAM(S): at 14:18

## 2018-09-24 RX ADMIN — ATORVASTATIN CALCIUM 10 MILLIGRAM(S): 80 TABLET, FILM COATED ORAL at 20:35

## 2018-09-24 RX ADMIN — BUDESONIDE AND FORMOTEROL FUMARATE DIHYDRATE 2 PUFF(S): 160; 4.5 AEROSOL RESPIRATORY (INHALATION) at 20:42

## 2018-09-24 RX ADMIN — Medication 1 SPRAY(S): at 20:35

## 2018-09-24 RX ADMIN — ARIPIPRAZOLE 10 MILLIGRAM(S): 15 TABLET ORAL at 14:19

## 2018-09-24 RX ADMIN — Medication 650 MILLIGRAM(S): at 19:00

## 2018-09-24 RX ADMIN — TAMSULOSIN HYDROCHLORIDE 0.4 MILLIGRAM(S): 0.4 CAPSULE ORAL at 20:34

## 2018-09-24 RX ADMIN — Medication 1 APPLICATION(S): at 20:40

## 2018-09-24 RX ADMIN — GABAPENTIN 600 MILLIGRAM(S): 400 CAPSULE ORAL at 20:34

## 2018-09-24 RX ADMIN — Medication 650 MILLIGRAM(S): at 16:04

## 2018-09-24 RX ADMIN — DIVALPROEX SODIUM 750 MILLIGRAM(S): 500 TABLET, DELAYED RELEASE ORAL at 20:37

## 2018-09-24 NOTE — H&P ADULT - HISTORY OF PRESENT ILLNESS
58 y/o male transferred from [facility] admitted with schizophrenia.  Pt states that he fears his neighbors who allegedly  are "drug dealers" are out to get him.
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
## 2018-09-24 NOTE — CONSULT NOTE ADULT - SUBJECTIVE AND OBJECTIVE BOX
KISHAN PRO  57y  Male      Patient is a 57y old  Male who presents with a chief complaint of Schizophrenia (24 Sep 2018 06:20)    HPI:  56 y/o male transferred from [facility] admitted with schizophrenia.  Pt states that he fears his neighbors who allegedly  are "drug dealers" are out to get him. (24 Sep 2018 06:20)    INTERVAL HPI/OVERNIGHT EVENTS:  HEALTH ISSUES - PROBLEM Dx:  DM (diabetes mellitus): DM (diabetes mellitus)  HIV disease: HIV disease  Bipolar 1 disorder: Bipolar 1 disorder  Schizophrenia: Schizophrenia        PAST MEDICAL & SURGICAL HISTORY:  PAF (paroxysmal atrial fibrillation)  Allergy  Bipolar 1 disorder  DM (diabetes mellitus)  HIV disease  S/P laparotomy    FAMILY HISTORY:  No pertinent family history in first degree relatives    influenza   Vaccine 0.5 milliLiter(s) IntraMuscular once      REVIEW OF SYSTEMS:  CONSTITUTIONAL: No fever, weight loss, or fatigue  EYES: No eye pain, visual disturbances, or discharge  ENMT:  No difficulty hearing, tinnitus, vertigo; No sinus or throat pain  NECK: No pain or stiffness  BREASTS: No pain, masses, or nipple discharge  RESPIRATORY: No cough, wheezing, chills or hemoptysis; No shortness of breath  CARDIOVASCULAR: No chest pain, palpitations, dizziness, or leg swelling  GASTROINTESTINAL: No abdominal or epigastric pain. No nausea, vomiting, or hematemesis; No diarrhea or constipation. No melena or hematochezia.  GENITOURINARY: No dysuria, frequency, hematuria, or incontinence  NEUROLOGICAL: No headaches, memory loss, loss of strength, numbness, or tremors  SKIN: No itching, burning, rashes, or lesions   LYMPH NODES: No enlarged glands  ENDOCRINE: No heat or cold intolerance; No hair loss  MUSCULOSKELETAL: No joint pain or swelling; No muscle, back, or extremity pain  PSYCHIATRIC: as per hpi and previous psych history  HEME/LYMPH: No easy bruising, or bleeding gums  ALLERY AND IMMUNOLOGIC: No hives or eczema    T(C): 36.6 (09-24-18 @ 06:11), Max: 36.6 (09-24-18 @ 06:11)  HR: 62 (09-24-18 @ 06:11) (62 - 83)  BP: 112/63 (09-24-18 @ 06:11) (111/83 - 112/63)  RR: 20 (09-24-18 @ 06:11) (18 - 20)  SpO2: --  Wt(kg): --Vital Signs Last 24 Hrs  T(C): 36.6 (24 Sep 2018 06:11), Max: 36.6 (24 Sep 2018 06:11)  T(F): 97.8 (24 Sep 2018 06:11), Max: 97.8 (24 Sep 2018 06:11)  HR: 62 (24 Sep 2018 06:11) (62 - 83)  BP: 112/63 (24 Sep 2018 06:11) (111/83 - 112/63)  BP(mean): --  RR: 20 (24 Sep 2018 06:11) (18 - 20)  SpO2: --    PHYSICAL EXAM:  GENERAL: NAD,  well-developed  HEAD:  Atraumatic, Normocephalic  EYES: EOMI, PERRLA, conjunctiva and sclera clear  ENMT: No tonsillar erythema, exudates, or enlargement; Moist mucous membranes, Good dentition, No lesions  NECK: Supple, No JVD, Normal thyroid  NERVOUS SYSTEM:  Alert & Oriented X3, Good concentration; Motor Strength 5/5 B/L upper and lower extremities; DTRs 2+ intact and symmetric  CHEST/LUNG: Clear to percussion bilaterally; No rales, rhonchi, wheezing, or rubs  HEART: Regular rate and rhythm; No murmurs, rubs, or gallops  ABDOMEN: Soft, Nontender, Nondistended; Bowel sounds present  EXTREMITIES:  2+ Peripheral Pulses, No clubbing, cyanosis, or edema  LYMPH: No lymphadenopathy noted  SKIN: No rashes or lesions  Neuro: alert  no focal deficits    Consultant(s) Notes Reviewed:  [x ] YES  [ ] NO  Care Discussed with Consultants/Other Providers [ x] YES  [ ] NO    LABS:              CAPILLARY BLOOD GLUCOSE                RADIOLOGY & ADDITIONAL TESTS:    Imaging Personally Reviewed:  [ ] YES  [ ] NO
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
## 2018-09-24 NOTE — BEHAVIORAL HEALTH ASSESSMENT NOTE - SUMMARY
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 Robles [facility]; remote hx of attempted self strangulation; PMH HIV, DVT, atrial fibrillation, DM2, HTN, HLD BIB EMS after he activated 911 [place] [employer] [address] as he was afraid that family members are coming to kill him.      Upon assessment, patient is disorganized in thought processes and endorses persecutory delusions that his family is trying to kill him, he also endorses delusions of carbon monoxide poisoning while at work last week.     Plan is to continue pt's home regimen of psychiatric medication. 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 Robles [facility]; remote hx of attempted self strangulation; PMH HIV, DVT, atrial fibrillation, DM2, HTN, HLD BIB EMS after he activated 911 [place] the [employer] [address] as he was afraid that family members are coming to kill him.      Upon assessment, patient is disorganized in thought processes and endorses persecutory delusions that his family is trying to kill him, he also endorses delusions of carbon monoxide poisoning while at work last week.     Plan is to continue pt's home regimen of medication: UTI diagnosed today in [facility] ED- will start KEFLEX 500MG BID X 10 DAYS HIV- Genvoya 150mg qd, HLD-lipitor 10mg hs, DM2 : metoformin 500mg bid, HTN metoprolol 25mg qd, DVT prophylaxis-aspirin 81mg, plavix, oxybutinin 10mg qd, Tamulosin 0.4mg qd, gabapentin 600mg tid    Psychiatric Plan: continue Abilify 10mg qd, diazepam 5mg tid, Depakote ER 750mg qhs, trazodone 50mg prn QHS

## 2018-09-25 LAB
ANION GAP SERPL CALC-SCNC: 14 MMOL/L — SIGNIFICANT CHANGE UP (ref 7–14)
BUN SERPL-MCNC: 12 MG/DL — SIGNIFICANT CHANGE UP (ref 10–20)
CALCIUM SERPL-MCNC: 9.5 MG/DL — SIGNIFICANT CHANGE UP (ref 8.5–10.1)
CHLORIDE SERPL-SCNC: 106 MMOL/L — SIGNIFICANT CHANGE UP (ref 98–110)
CHOLEST SERPL-MCNC: 133 MG/DL — SIGNIFICANT CHANGE UP (ref 100–200)
CO2 SERPL-SCNC: 24 MMOL/L — SIGNIFICANT CHANGE UP (ref 17–32)
CREAT SERPL-MCNC: 0.8 MG/DL — SIGNIFICANT CHANGE UP (ref 0.7–1.5)
ESTIMATED AVERAGE GLUCOSE: 100 MG/DL — SIGNIFICANT CHANGE UP (ref 68–114)
GLUCOSE SERPL-MCNC: 81 MG/DL — SIGNIFICANT CHANGE UP (ref 70–99)
HBA1C BLD-MCNC: 5.1 % — SIGNIFICANT CHANGE UP (ref 4–5.6)
HCT VFR BLD CALC: 39.9 % — LOW (ref 42–52)
HDLC SERPL-MCNC: 48 MG/DL — SIGNIFICANT CHANGE UP
HGB BLD-MCNC: 13.4 G/DL — LOW (ref 14–18)
LIPID PNL WITH DIRECT LDL SERPL: 69 MG/DL — SIGNIFICANT CHANGE UP (ref 4–129)
MCHC RBC-ENTMCNC: 29.8 PG — SIGNIFICANT CHANGE UP (ref 27–31)
MCHC RBC-ENTMCNC: 33.6 G/DL — SIGNIFICANT CHANGE UP (ref 32–37)
MCV RBC AUTO: 88.9 FL — SIGNIFICANT CHANGE UP (ref 80–94)
NRBC # BLD: 0 /100 WBCS — SIGNIFICANT CHANGE UP (ref 0–0)
PLATELET # BLD AUTO: 141 K/UL — SIGNIFICANT CHANGE UP (ref 130–400)
POTASSIUM SERPL-MCNC: 4.7 MMOL/L — SIGNIFICANT CHANGE UP (ref 3.5–5)
POTASSIUM SERPL-SCNC: 4.7 MMOL/L — SIGNIFICANT CHANGE UP (ref 3.5–5)
RBC # BLD: 4.49 M/UL — LOW (ref 4.7–6.1)
RBC # FLD: 14.9 % — HIGH (ref 11.5–14.5)
SODIUM SERPL-SCNC: 144 MMOL/L — SIGNIFICANT CHANGE UP (ref 135–146)
SPECIMEN SOURCE: SIGNIFICANT CHANGE UP
TOTAL CHOLESTEROL/HDL RATIO MEASUREMENT: 2.8 RATIO — LOW (ref 4–5.5)
TRIGL SERPL-MCNC: 65 MG/DL — SIGNIFICANT CHANGE UP (ref 10–149)
WBC # BLD: 3.03 K/UL — LOW (ref 4.8–10.8)
WBC # FLD AUTO: 3.03 K/UL — LOW (ref 4.8–10.8)

## 2018-09-25 RX ORDER — TAMSULOSIN HYDROCHLORIDE 0.4 MG/1
1 CAPSULE ORAL
Qty: 0 | Refills: 0 | COMMUNITY

## 2018-09-25 RX ORDER — CLOPIDOGREL BISULFATE 75 MG/1
1 TABLET, FILM COATED ORAL
Qty: 0 | Refills: 0 | COMMUNITY

## 2018-09-25 RX ADMIN — Medication 650 MILLIGRAM(S): at 06:45

## 2018-09-25 RX ADMIN — Medication 100 MILLIGRAM(S): at 08:20

## 2018-09-25 RX ADMIN — Medication 1 DROP(S): at 09:04

## 2018-09-25 RX ADMIN — GABAPENTIN 600 MILLIGRAM(S): 400 CAPSULE ORAL at 12:00

## 2018-09-25 RX ADMIN — Medication 1 PUFF(S): at 08:27

## 2018-09-25 RX ADMIN — GABAPENTIN 600 MILLIGRAM(S): 400 CAPSULE ORAL at 20:11

## 2018-09-25 RX ADMIN — Medication 650 MILLIGRAM(S): at 13:00

## 2018-09-25 RX ADMIN — DIVALPROEX SODIUM 750 MILLIGRAM(S): 500 TABLET, DELAYED RELEASE ORAL at 20:11

## 2018-09-25 RX ADMIN — ATORVASTATIN CALCIUM 10 MILLIGRAM(S): 80 TABLET, FILM COATED ORAL at 20:11

## 2018-09-25 RX ADMIN — Medication 1 PUFF(S): at 12:01

## 2018-09-25 RX ADMIN — GABAPENTIN 600 MILLIGRAM(S): 400 CAPSULE ORAL at 08:20

## 2018-09-25 RX ADMIN — MONTELUKAST 10 MILLIGRAM(S): 4 TABLET, CHEWABLE ORAL at 08:21

## 2018-09-25 RX ADMIN — Medication 120 MILLIGRAM(S): at 08:20

## 2018-09-25 RX ADMIN — Medication 5 MILLIGRAM(S): at 08:21

## 2018-09-25 RX ADMIN — Medication 1 APPLICATION(S): at 20:17

## 2018-09-25 RX ADMIN — ARIPIPRAZOLE 10 MILLIGRAM(S): 15 TABLET ORAL at 09:02

## 2018-09-25 RX ADMIN — METFORMIN HYDROCHLORIDE 500 MILLIGRAM(S): 850 TABLET ORAL at 20:11

## 2018-09-25 RX ADMIN — PANTOPRAZOLE SODIUM 40 MILLIGRAM(S): 20 TABLET, DELAYED RELEASE ORAL at 08:22

## 2018-09-25 RX ADMIN — Medication 25 MILLIGRAM(S): at 09:02

## 2018-09-25 RX ADMIN — Medication 650 MILLIGRAM(S): at 12:00

## 2018-09-25 RX ADMIN — Medication 500 MILLIGRAM(S): at 08:22

## 2018-09-25 RX ADMIN — ELVITEGRAVIR, COBICISTAT, EMTRICITABINE, AND TENOFOVIR ALAFENAMIDE 1 TABLET(S): 150; 150; 200; 10 TABLET ORAL at 08:25

## 2018-09-25 RX ADMIN — Medication 1 APPLICATION(S): at 08:26

## 2018-09-25 RX ADMIN — Medication 5 MILLIGRAM(S): at 08:23

## 2018-09-25 RX ADMIN — TAMSULOSIN HYDROCHLORIDE 0.4 MILLIGRAM(S): 0.4 CAPSULE ORAL at 20:11

## 2018-09-25 RX ADMIN — CLOPIDOGREL BISULFATE 75 MILLIGRAM(S): 75 TABLET, FILM COATED ORAL at 08:20

## 2018-09-25 RX ADMIN — METFORMIN HYDROCHLORIDE 500 MILLIGRAM(S): 850 TABLET ORAL at 08:21

## 2018-09-25 RX ADMIN — Medication 500 MILLIGRAM(S): at 20:11

## 2018-09-25 RX ADMIN — Medication 5 MILLIGRAM(S): at 20:11

## 2018-09-25 RX ADMIN — BUDESONIDE AND FORMOTEROL FUMARATE DIHYDRATE 2 PUFF(S): 160; 4.5 AEROSOL RESPIRATORY (INHALATION) at 08:27

## 2018-09-25 RX ADMIN — VALACYCLOVIR 1000 MILLIGRAM(S): 500 TABLET, FILM COATED ORAL at 08:26

## 2018-09-25 RX ADMIN — HALOPERIDOL DECANOATE 5 MILLIGRAM(S): 100 INJECTION INTRAMUSCULAR at 20:11

## 2018-09-25 RX ADMIN — Medication 1 PUFF(S): at 20:00

## 2018-09-25 RX ADMIN — Medication 50 MILLIGRAM(S): at 20:11

## 2018-09-25 RX ADMIN — BUDESONIDE AND FORMOTEROL FUMARATE DIHYDRATE 2 PUFF(S): 160; 4.5 AEROSOL RESPIRATORY (INHALATION) at 20:00

## 2018-09-25 RX ADMIN — Medication 1 APPLICATION(S): at 08:27

## 2018-09-25 RX ADMIN — Medication 1 SPRAY(S): at 08:26

## 2018-09-25 RX ADMIN — Medication 100 MILLIGRAM(S): at 20:11

## 2018-09-25 RX ADMIN — Medication 81 MILLIGRAM(S): at 08:21

## 2018-09-25 RX ADMIN — Medication 5 MILLIGRAM(S): at 12:00

## 2018-09-25 NOTE — PROGRESS NOTE BEHAVIORAL HEALTH - SUMMARY
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 Robles [facility]; remote hx of attempted self strangulation; PMH HIV, DVT, atrial fibrillation, DM2, HTN, HLD BIB EMS after he activated 911 [place] the [employer] [address] as he was afraid that family members are coming to kill him.      Upon assessment, patient is disorganized in thought processes and endorses persecutory delusions that his family is trying to kill him, he also endorses delusions of carbon monoxide poisoning while at work last week.     Plan is to continue pt's home regimen of medication: UTI diagnosed yesterday in [facility] ED- continue KEFLEX 500MG BID for 9 more days. HIV- Genvoya 150mg qd, HLD-lipitor 10mg hs, DM2 : metoformin 500mg bid, HTN metoprolol 25mg qd, DVT prophylaxis-aspirin 81mg, plavix, oxybutinin 5mg BID, Tamulosin 0.4mg qd, gabapentin 600mg tid    Psychiatric Plan: continue Abilify 10mg qd, diazepam 5mg tid, Depakote ER 750mg qhs, trazodone 50mg prn QHS

## 2018-09-25 NOTE — PROGRESS NOTE BEHAVIORAL HEALTH - NSBHFUPINTERVALHXFT_PSY_A_CORE
Pt seen and examined. He states he feels "good". He reports that he talked to his boyfriend, Francisco Javier, but doesn't want writer to talk to him "because he just wants money". He reports no feelings of paranoia while on the unit. He urges writer not to talk to his aunt again because "I'm grown and I don't trust her". He denies any other acute complaints at this time, stating he slept well and has been interacting with others on the unit. Per nursing, no acute overnight events reported.    spoke to pt's psychiatrist- Dr. Elly Robles - [phone]: she states that patient becomes paranoid intermittently and is mostly paranoid about his cousin. She states that he has been compliant with his medications and has been doing better over the years and able to function well in society. She states that he may not be entirely honest about his cocaine use and his paranoia can be drug related. She last saw patient in May 2018; next appointment is in October.

## 2018-09-26 DIAGNOSIS — I10 ESSENTIAL (PRIMARY) HYPERTENSION: ICD-10-CM

## 2018-09-26 DIAGNOSIS — E11.9 TYPE 2 DIABETES MELLITUS WITHOUT COMPLICATIONS: ICD-10-CM

## 2018-09-26 DIAGNOSIS — F20.0 PARANOID SCHIZOPHRENIA: ICD-10-CM

## 2018-09-26 DIAGNOSIS — F25.0 SCHIZOAFFECTIVE DISORDER, BIPOLAR TYPE: ICD-10-CM

## 2018-09-26 LAB
-  AMIKACIN: SIGNIFICANT CHANGE UP
-  AMPICILLIN/SULBACTAM: SIGNIFICANT CHANGE UP
-  AMPICILLIN: SIGNIFICANT CHANGE UP
-  AZTREONAM: SIGNIFICANT CHANGE UP
-  CEFAZOLIN: SIGNIFICANT CHANGE UP
-  CEFEPIME: SIGNIFICANT CHANGE UP
-  CEFOXITIN: SIGNIFICANT CHANGE UP
-  CEFTAZIDIME: SIGNIFICANT CHANGE UP
-  CEFTRIAXONE: SIGNIFICANT CHANGE UP
-  CIPROFLOXACIN: SIGNIFICANT CHANGE UP
-  ERTAPENEM: SIGNIFICANT CHANGE UP
-  GENTAMICIN: SIGNIFICANT CHANGE UP
-  IMIPENEM: SIGNIFICANT CHANGE UP
-  LEVOFLOXACIN: SIGNIFICANT CHANGE UP
-  MEROPENEM: SIGNIFICANT CHANGE UP
-  NITROFURANTOIN: SIGNIFICANT CHANGE UP
-  PIPERACILLIN/TAZOBACTAM: SIGNIFICANT CHANGE UP
-  TIGECYCLINE: SIGNIFICANT CHANGE UP
-  TOBRAMYCIN: SIGNIFICANT CHANGE UP
-  TRIMETHOPRIM/SULFAMETHOXAZOLE: SIGNIFICANT CHANGE UP
AMPHET UR-MCNC: NEGATIVE — SIGNIFICANT CHANGE UP
BACTERIA UR CULT: SIGNIFICANT CHANGE UP
BARBITURATES UR SCN-MCNC: NEGATIVE — SIGNIFICANT CHANGE UP
BENZODIAZ UR-MCNC: POSITIVE
COCAINE METAB.OTHER UR-MCNC: NEGATIVE — SIGNIFICANT CHANGE UP
METHADONE UR-MCNC: NEGATIVE — SIGNIFICANT CHANGE UP
METHOD TYPE: SIGNIFICANT CHANGE UP
OPIATES UR-MCNC: NEGATIVE — SIGNIFICANT CHANGE UP
ORGANISM # SPEC MICROSCOPIC CNT: SIGNIFICANT CHANGE UP
ORGANISM # SPEC MICROSCOPIC CNT: SIGNIFICANT CHANGE UP
PCP SPEC-MCNC: SIGNIFICANT CHANGE UP
PROPOXYPHENE QUALITATIVE URINE RESULT: NEGATIVE — SIGNIFICANT CHANGE UP
TSH SERPL-MCNC: 0.82 UIU/ML — SIGNIFICANT CHANGE UP (ref 0.27–4.2)

## 2018-09-26 RX ADMIN — Medication 1 DROP(S): at 08:30

## 2018-09-26 RX ADMIN — Medication 1 APPLICATION(S): at 08:24

## 2018-09-26 RX ADMIN — Medication 5 MILLIGRAM(S): at 08:23

## 2018-09-26 RX ADMIN — GABAPENTIN 600 MILLIGRAM(S): 400 CAPSULE ORAL at 08:24

## 2018-09-26 RX ADMIN — GABAPENTIN 600 MILLIGRAM(S): 400 CAPSULE ORAL at 13:00

## 2018-09-26 RX ADMIN — Medication 100 MILLIGRAM(S): at 20:10

## 2018-09-26 RX ADMIN — Medication 25 MILLIGRAM(S): at 08:23

## 2018-09-26 RX ADMIN — BUDESONIDE AND FORMOTEROL FUMARATE DIHYDRATE 2 PUFF(S): 160; 4.5 AEROSOL RESPIRATORY (INHALATION) at 20:13

## 2018-09-26 RX ADMIN — ARIPIPRAZOLE 10 MILLIGRAM(S): 15 TABLET ORAL at 08:23

## 2018-09-26 RX ADMIN — Medication 5 MILLIGRAM(S): at 08:31

## 2018-09-26 RX ADMIN — TAMSULOSIN HYDROCHLORIDE 0.4 MILLIGRAM(S): 0.4 CAPSULE ORAL at 20:10

## 2018-09-26 RX ADMIN — ELVITEGRAVIR, COBICISTAT, EMTRICITABINE, AND TENOFOVIR ALAFENAMIDE 1 TABLET(S): 150; 150; 200; 10 TABLET ORAL at 08:31

## 2018-09-26 RX ADMIN — Medication 120 MILLIGRAM(S): at 08:24

## 2018-09-26 RX ADMIN — PANTOPRAZOLE SODIUM 40 MILLIGRAM(S): 20 TABLET, DELAYED RELEASE ORAL at 08:32

## 2018-09-26 RX ADMIN — Medication 100 MILLIGRAM(S): at 08:23

## 2018-09-26 RX ADMIN — Medication 500 MILLIGRAM(S): at 20:11

## 2018-09-26 RX ADMIN — ATORVASTATIN CALCIUM 10 MILLIGRAM(S): 80 TABLET, FILM COATED ORAL at 20:10

## 2018-09-26 RX ADMIN — METFORMIN HYDROCHLORIDE 500 MILLIGRAM(S): 850 TABLET ORAL at 08:23

## 2018-09-26 RX ADMIN — Medication 500 MILLIGRAM(S): at 08:23

## 2018-09-26 RX ADMIN — Medication 1 PUFF(S): at 20:13

## 2018-09-26 RX ADMIN — Medication 5 MILLIGRAM(S): at 20:10

## 2018-09-26 RX ADMIN — GABAPENTIN 600 MILLIGRAM(S): 400 CAPSULE ORAL at 20:10

## 2018-09-26 RX ADMIN — Medication 1 PUFF(S): at 08:23

## 2018-09-26 RX ADMIN — Medication 650 MILLIGRAM(S): at 23:22

## 2018-09-26 RX ADMIN — MONTELUKAST 10 MILLIGRAM(S): 4 TABLET, CHEWABLE ORAL at 08:23

## 2018-09-26 RX ADMIN — DIVALPROEX SODIUM 750 MILLIGRAM(S): 500 TABLET, DELAYED RELEASE ORAL at 20:12

## 2018-09-26 RX ADMIN — Medication 5 MILLIGRAM(S): at 13:01

## 2018-09-26 RX ADMIN — Medication 1 APPLICATION(S): at 20:12

## 2018-09-26 RX ADMIN — Medication 81 MILLIGRAM(S): at 08:23

## 2018-09-26 RX ADMIN — VALACYCLOVIR 1000 MILLIGRAM(S): 500 TABLET, FILM COATED ORAL at 08:31

## 2018-09-26 RX ADMIN — METFORMIN HYDROCHLORIDE 500 MILLIGRAM(S): 850 TABLET ORAL at 20:10

## 2018-09-26 RX ADMIN — CLOPIDOGREL BISULFATE 75 MILLIGRAM(S): 75 TABLET, FILM COATED ORAL at 08:23

## 2018-09-26 RX ADMIN — BUDESONIDE AND FORMOTEROL FUMARATE DIHYDRATE 2 PUFF(S): 160; 4.5 AEROSOL RESPIRATORY (INHALATION) at 08:23

## 2018-09-26 RX ADMIN — Medication 1 SPRAY(S): at 08:24

## 2018-09-26 RX ADMIN — Medication 1 PUFF(S): at 13:00

## 2018-09-26 NOTE — ED POST DISCHARGE NOTE - DETAILS
Spoke with sister at [phone]- states he was transferred to a psychiatric hospital in  but does not know the name, she will call back with the correct number. However, she has the main number, attempted contact without answer at: [phone]

## 2018-09-27 LAB
ALLERGY+IMMUNOLOGY DIAG STUDY NOTE: SIGNIFICANT CHANGE UP
CULTURE RESULTS: NO GROWTH — SIGNIFICANT CHANGE UP
GLUCOSE BLDC GLUCOMTR-MCNC: 110 MG/DL — HIGH (ref 70–99)
GLUCOSE BLDC GLUCOMTR-MCNC: 92 MG/DL — SIGNIFICANT CHANGE UP (ref 70–99)
INR BLD: 1.1 RATIO — SIGNIFICANT CHANGE UP (ref 0.65–1.3)
PROTHROM AB SERPL-ACNC: 11.9 SEC — SIGNIFICANT CHANGE UP (ref 9.95–12.87)
SPECIMEN SOURCE: SIGNIFICANT CHANGE UP
TYPE + AB SCN PNL BLD: SIGNIFICANT CHANGE UP

## 2018-09-27 PROCEDURE — 99221 1ST HOSP IP/OBS SF/LOW 40: CPT

## 2018-09-27 RX ORDER — IBUPROFEN 200 MG
400 TABLET ORAL EVERY 6 HOURS
Qty: 0 | Refills: 0 | Status: DISCONTINUED | OUTPATIENT
Start: 2018-09-27 | End: 2018-09-27

## 2018-09-27 RX ADMIN — CLOPIDOGREL BISULFATE 75 MILLIGRAM(S): 75 TABLET, FILM COATED ORAL at 08:58

## 2018-09-27 RX ADMIN — DIVALPROEX SODIUM 750 MILLIGRAM(S): 500 TABLET, DELAYED RELEASE ORAL at 20:14

## 2018-09-27 RX ADMIN — Medication 1 DROP(S): at 09:01

## 2018-09-27 RX ADMIN — Medication 1 PUFF(S): at 14:11

## 2018-09-27 RX ADMIN — Medication 100 MILLIGRAM(S): at 08:58

## 2018-09-27 RX ADMIN — Medication 1 APPLICATION(S): at 20:15

## 2018-09-27 RX ADMIN — Medication 120 MILLIGRAM(S): at 08:58

## 2018-09-27 RX ADMIN — ATORVASTATIN CALCIUM 10 MILLIGRAM(S): 80 TABLET, FILM COATED ORAL at 20:14

## 2018-09-27 RX ADMIN — Medication 1 PUFF(S): at 09:00

## 2018-09-27 RX ADMIN — Medication 5 MILLIGRAM(S): at 20:14

## 2018-09-27 RX ADMIN — TAMSULOSIN HYDROCHLORIDE 0.4 MILLIGRAM(S): 0.4 CAPSULE ORAL at 20:14

## 2018-09-27 RX ADMIN — Medication 25 MILLIGRAM(S): at 08:58

## 2018-09-27 RX ADMIN — ARIPIPRAZOLE 10 MILLIGRAM(S): 15 TABLET ORAL at 08:57

## 2018-09-27 RX ADMIN — Medication 5 MILLIGRAM(S): at 08:58

## 2018-09-27 RX ADMIN — METFORMIN HYDROCHLORIDE 500 MILLIGRAM(S): 850 TABLET ORAL at 20:14

## 2018-09-27 RX ADMIN — Medication 500 MILLIGRAM(S): at 09:03

## 2018-09-27 RX ADMIN — METFORMIN HYDROCHLORIDE 500 MILLIGRAM(S): 850 TABLET ORAL at 08:58

## 2018-09-27 RX ADMIN — MONTELUKAST 10 MILLIGRAM(S): 4 TABLET, CHEWABLE ORAL at 08:58

## 2018-09-27 RX ADMIN — Medication 500 MILLIGRAM(S): at 20:15

## 2018-09-27 RX ADMIN — Medication 100 MILLIGRAM(S): at 20:14

## 2018-09-27 RX ADMIN — ELVITEGRAVIR, COBICISTAT, EMTRICITABINE, AND TENOFOVIR ALAFENAMIDE 1 TABLET(S): 150; 150; 200; 10 TABLET ORAL at 09:05

## 2018-09-27 RX ADMIN — Medication 5 MILLIGRAM(S): at 09:06

## 2018-09-27 RX ADMIN — GABAPENTIN 600 MILLIGRAM(S): 400 CAPSULE ORAL at 08:58

## 2018-09-27 RX ADMIN — BUDESONIDE AND FORMOTEROL FUMARATE DIHYDRATE 2 PUFF(S): 160; 4.5 AEROSOL RESPIRATORY (INHALATION) at 20:15

## 2018-09-27 RX ADMIN — Medication 5 MILLIGRAM(S): at 12:33

## 2018-09-27 RX ADMIN — Medication 1 PUFF(S): at 20:15

## 2018-09-27 RX ADMIN — Medication 81 MILLIGRAM(S): at 08:58

## 2018-09-27 RX ADMIN — Medication 1 SPRAY(S): at 08:59

## 2018-09-27 RX ADMIN — GABAPENTIN 600 MILLIGRAM(S): 400 CAPSULE ORAL at 12:33

## 2018-09-27 RX ADMIN — ALBUTEROL 1 PUFF(S): 90 AEROSOL, METERED ORAL at 06:23

## 2018-09-27 RX ADMIN — VALACYCLOVIR 1000 MILLIGRAM(S): 500 TABLET, FILM COATED ORAL at 09:04

## 2018-09-27 RX ADMIN — PANTOPRAZOLE SODIUM 40 MILLIGRAM(S): 20 TABLET, DELAYED RELEASE ORAL at 08:58

## 2018-09-27 RX ADMIN — BUDESONIDE AND FORMOTEROL FUMARATE DIHYDRATE 2 PUFF(S): 160; 4.5 AEROSOL RESPIRATORY (INHALATION) at 08:59

## 2018-09-27 RX ADMIN — GABAPENTIN 600 MILLIGRAM(S): 400 CAPSULE ORAL at 20:14
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
## 2018-09-27 NOTE — PROGRESS NOTE BEHAVIORAL HEALTH - SUMMARY
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 Robles [facility]; remote hx of attempted self strangulation; PMH HIV, DVT, atrial fibrillation, DM2, HTN, HLD BIB EMS after he activated 911 [place] [facility] the [employer] [address] as he was afraid that family members are coming to kill him.      Upon assessment, patient is disorganized in thought processes and endorses persecutory delusions that his family is trying to kill him, he also endorses delusions of carbon monoxide poisoning while at work last week.     Plan is to continue pt's home regimen of medication: UTI diagnosed yesterday in [facility] ED- continue KEFLEX 500MG BID for 9 more days. HIV- Genvoya 150mg qd, HLD-lipitor 10mg hs, DM2 : metoformin 500mg bid, HTN metoprolol 25mg qd, DVT prophylaxis-aspirin 81mg, plavix, oxybutinin 5mg BID, Tamulosin 0.4mg qd, gabapentin 600mg tid    Psychiatric Plan: continue Abilify 10mg qd, diazepam 5mg tid, Depakote ER 750mg qhs, trazodone 50mg prn QHS

## 2018-09-27 NOTE — CHART NOTE - NSCHARTNOTEFT_GEN_A_CORE
Social Work Note:    Patient is visible on the unit during the day.  On interview he is calm and cooperative.  Insight and judgment remain poor at this time.      Plan is for patient to resume outpatient treatment with his private psychiatrist Dr. Robles.     At this time patient is not psychiatrically stable for discharge.

## 2018-09-27 NOTE — CONSULT NOTE ADULT - SUBJECTIVE AND OBJECTIVE BOX
PODIATRY CONSULT   KISHAN PRO is a pleasant well-nourished, well developed 57y Male in no acute distress, alert awake, and oriented to person, place and time.   Patient is a 57y old  Male who presents with a chief complaint of Schizophrenia (26 Sep 2018 08:16)    HPI:  56 y/o male transferred from [facility] admitted with schizophrenia.  Pt states that he fears his neighbors who allegedly  are "drug dealers" are out to get him. 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  He states that he supposed to follow up with his podiatrist but was not able to bc he is hospitalized at [facility].  He states that he has pain when he is ambulating. He denies any n/v/f/c/sob.    PARANOIA PSYCHOSIS  Afib  HLD (hyperlipidemia)  HTN (hypertension)  DM (diabetes mellitus)  HIV (human immunodeficiency virus infection)  Deep vein thrombosis of both lower extremities  Paranoid schizophrenia  PAF (paroxysmal atrial fibrillation)  Allergy  Bipolar 1 disorder  DM (diabetes mellitus)  HIV disease      PMH: PARANOIA PSYCHOSIS  Afib  HLD (hyperlipidemia)  HTN (hypertension)  DM (diabetes mellitus)  HIV (human immunodeficiency virus infection)  Deep vein thrombosis of both lower extremities  Paranoid schizophrenia  PAF (paroxysmal atrial fibrillation)  Allergy  Bipolar 1 disorder  DM (diabetes mellitus)  HIV disease    PSH: No significant past surgical history  S/P laparotomy    Medication cephalexin 500 milliGRAM(s) Oral every 12 hours  levoFLOXacin  Tablet 750 milliGRAM(s) Oral every 24 hours  tenofovir alafenamide 10 mG/irhotklvatyz702 mG/cobicistat 150 mG/emtricitabine 200 mG (GENVOYA) 1 Tablet(s) Oral daily  valACYclovir 1000 milliGRAM(s) Oral daily    Allergy: No Known Allergies        Labs:                        13.4   3.03  )-----------( 141      ( 25 Sep 2018 09:51 )             39.9       09-25    144  |  106  |  12  ----------------------------<  81  4.7   |  24  |  0.8    Ca    9.5      25 Sep 2018 09:51              O:   Derm: Ulceration left 1st medial border, - hyperkeratotic border, wound base Granular patches/ , wound size (0.1 cm X 0.2 cm) + edema, + germain-wound erythema, - purulence, - fluctuance, - tracking/tunneling, - probe to bone. - streaking erythema. - xerosis, - hemosiderin deposits. +active sign of infection  Vascular: Dorsalis Pedis and Posterior Tibial pulses 2/4.  Capillary re-fill time less then 3 seconds digits 1-5 bilateral.  B/L feet warm to touch  Neuro: Protective sensation intact/diminished to the level of the digits bilateral.  MSK: Muscle strength 5/5 all major muscle groups bilateral.        Assessment and Plan:   ulceration left 1st medial border secondary to partial nail avulsion w/ matrixectomy 3 weeks ago  Chart reviewed and Patient evaluated  Discussed diagnosis and treatment with patient  Applied betadine and bandage  X-rays ordered  Recommend ID consult  Discussed importance of daily foot examinations and proper shoe gear and to importance of lower Fasting Blood Glucose levels.   sx booked tomorrow left first medial border partial nail avulsion w/ matrixectomy left foot.  Medical clearance requested with stratification for OR  NPO MN  podiatry will f/u while in house. PODIATRY CONSULT   KISHAN PRO is a pleasant well-nourished, well developed 57y Male in no acute distress, alert awake, and oriented to person, place and time.   Patient is a 57y old  Male who presents with a chief complaint of Schizophrenia (26 Sep 2018 08:16)    HPI:  56 y/o male transferred from [facility] admitted with schizophrenia.  Pt states that he fears his neighbors who allegedly  are "drug dealers" are out to get him. 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  He states that he supposed to follow up with his podiatrist but was not able to bc he is hospitalized at [facility].  He states that he has pain when he is ambulating. He denies any n/v/f/c/sob.    PARANOIA PSYCHOSIS  Afib  HLD (hyperlipidemia)  HTN (hypertension)  DM (diabetes mellitus)  HIV (human immunodeficiency virus infection)  Deep vein thrombosis of both lower extremities  Paranoid schizophrenia  PAF (paroxysmal atrial fibrillation)  Allergy  Bipolar 1 disorder  DM (diabetes mellitus)  HIV disease      PMH: PARANOIA PSYCHOSIS  Afib  HLD (hyperlipidemia)  HTN (hypertension)  DM (diabetes mellitus)  HIV (human immunodeficiency virus infection)  Deep vein thrombosis of both lower extremities  Paranoid schizophrenia  PAF (paroxysmal atrial fibrillation)  Allergy  Bipolar 1 disorder  DM (diabetes mellitus)  HIV disease    PSH: No significant past surgical history  S/P laparotomy    Medication cephalexin 500 milliGRAM(s) Oral every 12 hours  levoFLOXacin  Tablet 750 milliGRAM(s) Oral every 24 hours  tenofovir alafenamide 10 mG/qlnbfoaclvcw400 mG/cobicistat 150 mG/emtricitabine 200 mG (GENVOYA) 1 Tablet(s) Oral daily  valACYclovir 1000 milliGRAM(s) Oral daily    Allergy: No Known Allergies        Labs:                        13.4   3.03  )-----------( 141      ( 25 Sep 2018 09:51 )             39.9       09-25    144  |  106  |  12  ----------------------------<  81  4.7   |  24  |  0.8    Ca    9.5      25 Sep 2018 09:51              O:   Derm: Ulceration left 1st medial border, - hyperkeratotic border, wound base Granular patches/ , wound size (0.1 cm X 0.2 cm) + edema, + germain-wound erythema, - purulence, - fluctuance, - tracking/tunneling, - probe to bone. - streaking erythema. - xerosis, - hemosiderin deposits. +active sign of infection  Vascular: Dorsalis Pedis and Posterior Tibial pulses 2/4.  Capillary re-fill time less then 3 seconds digits 1-5 bilateral.  B/L feet warm to touch  Neuro: Protective sensation intact/diminished to the level of the digits bilateral.  MSK: Muscle strength 5/5 all major muscle groups bilateral.        Assessment and Plan:   ulceration left 1st medial border secondary to partial nail avulsion w/ matrixectomy 3 weeks ago  Chart reviewed and Patient evaluated  Discussed diagnosis and treatment with patient  Applied betadine and bandage  X-rays ordered  Recommend ID consult  Discussed importance of daily foot examinations and proper shoe gear and to importance of lower Fasting Blood Glucose levels.   sx booked tomorrow left first medial border partial nail avulsion w/ matrixectomy left foot.  Medical clearance requested with stratification for OR  NPO MN  please optimize all the labs for sx tomorrow  podiatry will f/u while in house. PODIATRY CONSULT   KISHAN PRO is a pleasant well-nourished, well developed 57y Male in no acute distress, alert awake, and oriented to person, place and time.   Patient is a 57y old  Male who presents with a chief complaint of Schizophrenia (26 Sep 2018 08:16)    HPI:  56 y/o male transferred from [facility] admitted with schizophrenia.  Pt states that he fears his neighbors who allegedly  are "drug dealers" are out to get him. 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  He states that he supposed to follow up with his podiatrist but was not able to bc he is hospitalized at [facility].  He states that he has pain when he is ambulating. He denies any n/v/f/c/sob.    PARANOIA PSYCHOSIS  Afib  HLD (hyperlipidemia)  HTN (hypertension)  DM (diabetes mellitus)  HIV (human immunodeficiency virus infection)  Deep vein thrombosis of both lower extremities  Paranoid schizophrenia  PAF (paroxysmal atrial fibrillation)  Allergy  Bipolar 1 disorder  DM (diabetes mellitus)  HIV disease      PMH: PARANOIA PSYCHOSIS  Afib  HLD (hyperlipidemia)  HTN (hypertension)  DM (diabetes mellitus)  HIV (human immunodeficiency virus infection)  Deep vein thrombosis of both lower extremities  Paranoid schizophrenia  PAF (paroxysmal atrial fibrillation)  Allergy  Bipolar 1 disorder  DM (diabetes mellitus)  HIV disease    PSH: No significant past surgical history  S/P laparotomy    Medication cephalexin 500 milliGRAM(s) Oral every 12 hours  levoFLOXacin  Tablet 750 milliGRAM(s) Oral every 24 hours  tenofovir alafenamide 10 mG/tmcvdwuznatk632 mG/cobicistat 150 mG/emtricitabine 200 mG (GENVOYA) 1 Tablet(s) Oral daily  valACYclovir 1000 milliGRAM(s) Oral daily    Allergy: No Known Allergies        Labs:                        13.4   3.03  )-----------( 141      ( 25 Sep 2018 09:51 )             39.9       09-25    144  |  106  |  12  ----------------------------<  81  4.7   |  24  |  0.8    Ca    9.5      25 Sep 2018 09:51              O:   Derm: Ulceration Right 1st medial border, - hyperkeratotic border, wound base Granular patches/ , wound size (0.1 cm X 0.2 cm) + edema, + germain-wound erythema, - purulence, - fluctuance, - tracking/tunneling, - probe to bone. - streaking erythema. - xerosis, - hemosiderin deposits. +active sign of infection  Vascular: Dorsalis Pedis and Posterior Tibial pulses 2/4.  Capillary re-fill time less then 3 seconds digits 1-5 bilateral.  B/L feet warm to touch  Neuro: Protective sensation intact/diminished to the level of the digits bilateral.  MSK: Muscle strength 5/5 all major muscle groups bilateral.        Assessment and Plan:   ulceration Right 1st medial border secondary to partial nail avulsion w/ matrixectomy 3 weeks ago  Chart reviewed and Patient evaluated  Discussed diagnosis and treatment with patient  Applied betadine and bandage  X-rays ordered  A duplex ordered  Recommend ID consult  Discussed importance of daily foot examinations and proper shoe gear and to importance of lower Fasting Blood Glucose levels.   sx booked tomorrow left first medial border partial nail avulsion w/ matrixectomy left foot.  Medical clearance requested with stratification for OR  NPO MN  please optimize all the labs for sx tomorrow  podiatry will f/u while in house. PODIATRY CONSULT   KISHAN PRO is a pleasant well-nourished, well developed 57y Male in no acute distress, alert awake, and oriented to person, place and time.   Patient is a 57y old  Male who presents with a chief complaint of Schizophrenia (26 Sep 2018 08:16)    HPI:  58 y/o male transferred from [facility] admitted with schizophrenia.  Pt states that he fears his neighbors who allegedly  are "drug dealers" are out to get him. 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  He states that he supposed to follow up with his podiatrist but was not able to bc he is hospitalized at [facility].  He states that he has pain when he is ambulating. He denies any n/v/f/c/sob.    PARANOIA PSYCHOSIS  Afib  HLD (hyperlipidemia)  HTN (hypertension)  DM (diabetes mellitus)  HIV (human immunodeficiency virus infection)  Deep vein thrombosis of both lower extremities  Paranoid schizophrenia  PAF (paroxysmal atrial fibrillation)  Allergy  Bipolar 1 disorder  DM (diabetes mellitus)  HIV disease      PMH: PARANOIA PSYCHOSIS  Afib  HLD (hyperlipidemia)  HTN (hypertension)  DM (diabetes mellitus)  HIV (human immunodeficiency virus infection)  Deep vein thrombosis of both lower extremities  Paranoid schizophrenia  PAF (paroxysmal atrial fibrillation)  Allergy  Bipolar 1 disorder  DM (diabetes mellitus)  HIV disease    PSH: No significant past surgical history  S/P laparotomy    Medication cephalexin 500 milliGRAM(s) Oral every 12 hours  levoFLOXacin  Tablet 750 milliGRAM(s) Oral every 24 hours  tenofovir alafenamide 10 mG/weuhycjegyga645 mG/cobicistat 150 mG/emtricitabine 200 mG (GENVOYA) 1 Tablet(s) Oral daily  valACYclovir 1000 milliGRAM(s) Oral daily    Allergy: No Known Allergies        Labs:                        13.4   3.03  )-----------( 141      ( 25 Sep 2018 09:51 )             39.9       09-25    144  |  106  |  12  ----------------------------<  81  4.7   |  24  |  0.8    Ca    9.5      25 Sep 2018 09:51              O:   Derm: Ulceration Right 1st medial border, - hyperkeratotic border, wound base Granular patches/ , wound size (0.1 cm X 0.2 cm) + edema, + germain-wound erythema, - purulence, - fluctuance, - tracking/tunneling, - probe to bone. - streaking erythema. - xerosis, - hemosiderin deposits. +active sign of infection  Vascular: Dorsalis Pedis and Posterior Tibial pulses 2/4.  Capillary re-fill time less then 3 seconds digits 1-5 bilateral.  B/L feet warm to touch  Neuro: Protective sensation intact/diminished to the level of the digits bilateral.  MSK: Muscle strength 5/5 all major muscle groups bilateral.        Assessment and Plan:   ulceration Right 1st medial border secondary to partial nail avulsion w/ matrixectomy 3 weeks ago  Chart reviewed and Patient evaluated  Discussed diagnosis and treatment with patient  Applied betadine and bandage  X-rays ordered  A duplex ordered  Recommend ID consult  Discussed importance of daily foot examinations and proper shoe gear and to importance of lower Fasting Blood Glucose levels.   sx booked tomorrow Right first medial border partial nail avulsion w/ matrixectomy Right foot.  Medical clearance requested with stratification for OR  NPO MN  please optimize all the labs for sx tomorrow  podiatry will f/u while in house.

## 2018-09-28 DIAGNOSIS — I10 ESSENTIAL (PRIMARY) HYPERTENSION: ICD-10-CM

## 2018-09-28 LAB
GLUCOSE BLDC GLUCOMTR-MCNC: 111 MG/DL — HIGH (ref 70–99)
GLUCOSE BLDC GLUCOMTR-MCNC: 73 MG/DL — SIGNIFICANT CHANGE UP (ref 70–99)
GLUCOSE BLDC GLUCOMTR-MCNC: 78 MG/DL — SIGNIFICANT CHANGE UP (ref 70–99)
GLUCOSE BLDC GLUCOMTR-MCNC: 88 MG/DL — SIGNIFICANT CHANGE UP (ref 70–99)

## 2018-09-28 RX ORDER — DIAZEPAM 5 MG
5 TABLET ORAL THREE TIMES A DAY
Qty: 0 | Refills: 0 | Status: DISCONTINUED | OUTPATIENT
Start: 2018-09-28 | End: 2018-10-04

## 2018-09-28 RX ORDER — DIPHENHYDRAMINE HCL 50 MG
50 CAPSULE ORAL ONCE
Qty: 0 | Refills: 0 | Status: COMPLETED | OUTPATIENT
Start: 2018-09-28 | End: 2018-09-28

## 2018-09-28 RX ORDER — ARIPIPRAZOLE 15 MG/1
12 TABLET ORAL DAILY
Qty: 0 | Refills: 0 | Status: DISCONTINUED | OUTPATIENT
Start: 2018-09-29 | End: 2018-09-29

## 2018-09-28 RX ADMIN — GABAPENTIN 600 MILLIGRAM(S): 400 CAPSULE ORAL at 08:01

## 2018-09-28 RX ADMIN — Medication 5 MILLIGRAM(S): at 20:31

## 2018-09-28 RX ADMIN — Medication 1 SPRAY(S): at 08:05

## 2018-09-28 RX ADMIN — Medication 5 MILLIGRAM(S): at 08:09

## 2018-09-28 RX ADMIN — Medication 25 MILLIGRAM(S): at 08:01

## 2018-09-28 RX ADMIN — Medication 500 MILLIGRAM(S): at 08:02

## 2018-09-28 RX ADMIN — MONTELUKAST 10 MILLIGRAM(S): 4 TABLET, CHEWABLE ORAL at 08:05

## 2018-09-28 RX ADMIN — Medication 1 APPLICATION(S): at 10:06

## 2018-09-28 RX ADMIN — METFORMIN HYDROCHLORIDE 500 MILLIGRAM(S): 850 TABLET ORAL at 20:31

## 2018-09-28 RX ADMIN — HALOPERIDOL DECANOATE 5 MILLIGRAM(S): 100 INJECTION INTRAMUSCULAR at 23:44

## 2018-09-28 RX ADMIN — Medication 120 MILLIGRAM(S): at 08:01

## 2018-09-28 RX ADMIN — METFORMIN HYDROCHLORIDE 500 MILLIGRAM(S): 850 TABLET ORAL at 08:01

## 2018-09-28 RX ADMIN — ARIPIPRAZOLE 10 MILLIGRAM(S): 15 TABLET ORAL at 08:01

## 2018-09-28 RX ADMIN — Medication 2 MILLIGRAM(S): at 13:12

## 2018-09-28 RX ADMIN — GABAPENTIN 600 MILLIGRAM(S): 400 CAPSULE ORAL at 14:46

## 2018-09-28 RX ADMIN — Medication 81 MILLIGRAM(S): at 08:01

## 2018-09-28 RX ADMIN — Medication 100 MILLIGRAM(S): at 20:31

## 2018-09-28 RX ADMIN — BUDESONIDE AND FORMOTEROL FUMARATE DIHYDRATE 2 PUFF(S): 160; 4.5 AEROSOL RESPIRATORY (INHALATION) at 08:02

## 2018-09-28 RX ADMIN — CLOPIDOGREL BISULFATE 75 MILLIGRAM(S): 75 TABLET, FILM COATED ORAL at 08:01

## 2018-09-28 RX ADMIN — TAMSULOSIN HYDROCHLORIDE 0.4 MILLIGRAM(S): 0.4 CAPSULE ORAL at 20:31

## 2018-09-28 RX ADMIN — Medication 2 MILLIGRAM(S): at 23:44

## 2018-09-28 RX ADMIN — Medication 650 MILLIGRAM(S): at 06:33

## 2018-09-28 RX ADMIN — Medication 5 MILLIGRAM(S): at 08:01

## 2018-09-28 RX ADMIN — PANTOPRAZOLE SODIUM 40 MILLIGRAM(S): 20 TABLET, DELAYED RELEASE ORAL at 08:01

## 2018-09-28 RX ADMIN — Medication 1 APPLICATION(S): at 20:33

## 2018-09-28 RX ADMIN — Medication 500 MILLIGRAM(S): at 20:32

## 2018-09-28 RX ADMIN — Medication 5 MILLIGRAM(S): at 20:32

## 2018-09-28 RX ADMIN — Medication 50 MILLIGRAM(S): at 13:11

## 2018-09-28 RX ADMIN — Medication 1 PUFF(S): at 08:07

## 2018-09-28 RX ADMIN — DIVALPROEX SODIUM 750 MILLIGRAM(S): 500 TABLET, DELAYED RELEASE ORAL at 20:32

## 2018-09-28 RX ADMIN — GABAPENTIN 600 MILLIGRAM(S): 400 CAPSULE ORAL at 20:31

## 2018-09-28 RX ADMIN — HALOPERIDOL DECANOATE 5 MILLIGRAM(S): 100 INJECTION INTRAMUSCULAR at 16:42

## 2018-09-28 RX ADMIN — ELVITEGRAVIR, COBICISTAT, EMTRICITABINE, AND TENOFOVIR ALAFENAMIDE 1 TABLET(S): 150; 150; 200; 10 TABLET ORAL at 08:04

## 2018-09-28 RX ADMIN — ATORVASTATIN CALCIUM 10 MILLIGRAM(S): 80 TABLET, FILM COATED ORAL at 20:31

## 2018-09-28 RX ADMIN — VALACYCLOVIR 1000 MILLIGRAM(S): 500 TABLET, FILM COATED ORAL at 08:03

## 2018-09-28 RX ADMIN — Medication 1 DROP(S): at 08:06

## 2018-09-28 RX ADMIN — Medication 100 MILLIGRAM(S): at 08:01

## 2018-09-28 RX ADMIN — Medication 5 MILLIGRAM(S): at 14:46
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
## 2018-09-29 LAB
GLUCOSE BLDC GLUCOMTR-MCNC: 76 MG/DL — SIGNIFICANT CHANGE UP (ref 70–99)
GLUCOSE BLDC GLUCOMTR-MCNC: 86 MG/DL — SIGNIFICANT CHANGE UP (ref 70–99)
GLUCOSE BLDC GLUCOMTR-MCNC: 94 MG/DL — SIGNIFICANT CHANGE UP (ref 70–99)
GLUCOSE BLDC GLUCOMTR-MCNC: 98 MG/DL — SIGNIFICANT CHANGE UP (ref 70–99)

## 2018-09-29 RX ORDER — HALOPERIDOL DECANOATE 100 MG/ML
5 INJECTION INTRAMUSCULAR ONCE
Qty: 0 | Refills: 0 | Status: COMPLETED | OUTPATIENT
Start: 2018-09-29 | End: 2018-09-29

## 2018-09-29 RX ORDER — ARIPIPRAZOLE 15 MG/1
12 TABLET ORAL AT BEDTIME
Qty: 0 | Refills: 0 | Status: DISCONTINUED | OUTPATIENT
Start: 2018-09-29 | End: 2018-10-02

## 2018-09-29 RX ORDER — DIPHENHYDRAMINE HCL 50 MG
50 CAPSULE ORAL ONCE
Qty: 0 | Refills: 0 | Status: COMPLETED | OUTPATIENT
Start: 2018-09-29 | End: 2018-09-29

## 2018-09-29 RX ADMIN — VALACYCLOVIR 1000 MILLIGRAM(S): 500 TABLET, FILM COATED ORAL at 08:34

## 2018-09-29 RX ADMIN — DIVALPROEX SODIUM 750 MILLIGRAM(S): 500 TABLET, DELAYED RELEASE ORAL at 21:17

## 2018-09-29 RX ADMIN — Medication 5 MILLIGRAM(S): at 08:27

## 2018-09-29 RX ADMIN — Medication 500 MILLIGRAM(S): at 08:31

## 2018-09-29 RX ADMIN — Medication 5 MILLIGRAM(S): at 12:05

## 2018-09-29 RX ADMIN — Medication 120 MILLIGRAM(S): at 08:24

## 2018-09-29 RX ADMIN — CLOPIDOGREL BISULFATE 75 MILLIGRAM(S): 75 TABLET, FILM COATED ORAL at 08:24

## 2018-09-29 RX ADMIN — METFORMIN HYDROCHLORIDE 500 MILLIGRAM(S): 850 TABLET ORAL at 21:24

## 2018-09-29 RX ADMIN — ELVITEGRAVIR, COBICISTAT, EMTRICITABINE, AND TENOFOVIR ALAFENAMIDE 1 TABLET(S): 150; 150; 200; 10 TABLET ORAL at 08:34

## 2018-09-29 RX ADMIN — Medication 500 MILLIGRAM(S): at 21:17

## 2018-09-29 RX ADMIN — ARIPIPRAZOLE 12 MILLIGRAM(S): 15 TABLET ORAL at 08:23

## 2018-09-29 RX ADMIN — ARIPIPRAZOLE 12 MILLIGRAM(S): 15 TABLET ORAL at 21:17

## 2018-09-29 RX ADMIN — GABAPENTIN 600 MILLIGRAM(S): 400 CAPSULE ORAL at 08:23

## 2018-09-29 RX ADMIN — BUDESONIDE AND FORMOTEROL FUMARATE DIHYDRATE 2 PUFF(S): 160; 4.5 AEROSOL RESPIRATORY (INHALATION) at 08:25

## 2018-09-29 RX ADMIN — MONTELUKAST 10 MILLIGRAM(S): 4 TABLET, CHEWABLE ORAL at 08:23

## 2018-09-29 RX ADMIN — METFORMIN HYDROCHLORIDE 500 MILLIGRAM(S): 850 TABLET ORAL at 08:23

## 2018-09-29 RX ADMIN — GABAPENTIN 600 MILLIGRAM(S): 400 CAPSULE ORAL at 21:20

## 2018-09-29 RX ADMIN — Medication 1 DROP(S): at 08:30

## 2018-09-29 RX ADMIN — Medication 1 APPLICATION(S): at 08:24

## 2018-09-29 RX ADMIN — Medication 50 MILLIGRAM(S): at 21:00

## 2018-09-29 RX ADMIN — PANTOPRAZOLE SODIUM 40 MILLIGRAM(S): 20 TABLET, DELAYED RELEASE ORAL at 08:23

## 2018-09-29 RX ADMIN — TAMSULOSIN HYDROCHLORIDE 0.4 MILLIGRAM(S): 0.4 CAPSULE ORAL at 21:22

## 2018-09-29 RX ADMIN — Medication 2 MILLIGRAM(S): at 21:00

## 2018-09-29 RX ADMIN — HALOPERIDOL DECANOATE 5 MILLIGRAM(S): 100 INJECTION INTRAMUSCULAR at 21:00

## 2018-09-29 RX ADMIN — GABAPENTIN 600 MILLIGRAM(S): 400 CAPSULE ORAL at 12:05

## 2018-09-29 RX ADMIN — HALOPERIDOL DECANOATE 5 MILLIGRAM(S): 100 INJECTION INTRAMUSCULAR at 12:06

## 2018-09-29 RX ADMIN — Medication 5 MILLIGRAM(S): at 08:23

## 2018-09-29 RX ADMIN — Medication 100 MILLIGRAM(S): at 21:19

## 2018-09-29 RX ADMIN — Medication 81 MILLIGRAM(S): at 08:24

## 2018-09-29 RX ADMIN — Medication 5 MILLIGRAM(S): at 21:22

## 2018-09-29 RX ADMIN — Medication 1 PUFF(S): at 08:24

## 2018-09-29 RX ADMIN — Medication 1 PUFF(S): at 12:09

## 2018-09-29 RX ADMIN — ATORVASTATIN CALCIUM 10 MILLIGRAM(S): 80 TABLET, FILM COATED ORAL at 21:17

## 2018-09-29 RX ADMIN — Medication 1 SPRAY(S): at 08:25

## 2018-09-29 RX ADMIN — Medication 100 MILLIGRAM(S): at 08:23

## 2018-09-29 NOTE — PROGRESS NOTE BEHAVIORAL HEALTH - SUMMARY
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 Robles [facility]; remote hx of attempted self strangulation; PMH HIV, DVT, atrial fibrillation, DM2, HTN, HLD BIB EMS after he activated 911 [place] [employer] [address] as he was afraid that family members are coming to kill him.      Upon assessment, patient is disorganized in thought processes and endorses persecutory delusions that his family is trying to kill him, he also endorses delusions of carbon monoxide poisoning while at work last week.     Plan is to continue pt's home regimen of medication: UTI diagnosed yesterday in [facility] ED- continue KEFLEX 500MG BID for 9 more days. HIV- Genvoya 150mg qd, HLD-lipitor 10mg hs, DM2 : metoformin 500mg bid, DVT prophylaxis-aspirin 81mg, plavix, oxybutinin 5mg BID, Tamulosin 0.4mg qd, gabapentin 600mg tid    Psychiatric Plan: continue Abilify 10mg qd, diazepam 5mg tid, Depakote ER 750mg qhs, trazodone 50mg prn QHS
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
## 2018-09-30 LAB
GLUCOSE BLDC GLUCOMTR-MCNC: 103 MG/DL — HIGH (ref 70–99)
GLUCOSE BLDC GLUCOMTR-MCNC: 95 MG/DL — SIGNIFICANT CHANGE UP (ref 70–99)
GLUCOSE BLDC GLUCOMTR-MCNC: 97 MG/DL — SIGNIFICANT CHANGE UP (ref 70–99)
GLUCOSE BLDC GLUCOMTR-MCNC: 98 MG/DL — SIGNIFICANT CHANGE UP (ref 70–99)

## 2018-09-30 RX ADMIN — METFORMIN HYDROCHLORIDE 500 MILLIGRAM(S): 850 TABLET ORAL at 08:27

## 2018-09-30 RX ADMIN — Medication 1 PUFF(S): at 07:59

## 2018-09-30 RX ADMIN — Medication 5 MILLIGRAM(S): at 20:53

## 2018-09-30 RX ADMIN — Medication 1 APPLICATION(S): at 08:34

## 2018-09-30 RX ADMIN — ELVITEGRAVIR, COBICISTAT, EMTRICITABINE, AND TENOFOVIR ALAFENAMIDE 1 TABLET(S): 150; 150; 200; 10 TABLET ORAL at 08:35

## 2018-09-30 RX ADMIN — Medication 5 MILLIGRAM(S): at 12:25

## 2018-09-30 RX ADMIN — PANTOPRAZOLE SODIUM 40 MILLIGRAM(S): 20 TABLET, DELAYED RELEASE ORAL at 08:37

## 2018-09-30 RX ADMIN — BUDESONIDE AND FORMOTEROL FUMARATE DIHYDRATE 2 PUFF(S): 160; 4.5 AEROSOL RESPIRATORY (INHALATION) at 20:58

## 2018-09-30 RX ADMIN — GABAPENTIN 600 MILLIGRAM(S): 400 CAPSULE ORAL at 12:24

## 2018-09-30 RX ADMIN — Medication 1 SPRAY(S): at 08:34

## 2018-09-30 RX ADMIN — Medication 1 DROP(S): at 08:29

## 2018-09-30 RX ADMIN — GABAPENTIN 600 MILLIGRAM(S): 400 CAPSULE ORAL at 08:27

## 2018-09-30 RX ADMIN — MONTELUKAST 10 MILLIGRAM(S): 4 TABLET, CHEWABLE ORAL at 08:27

## 2018-09-30 RX ADMIN — Medication 1 PUFF(S): at 20:59

## 2018-09-30 RX ADMIN — DIVALPROEX SODIUM 750 MILLIGRAM(S): 500 TABLET, DELAYED RELEASE ORAL at 20:56

## 2018-09-30 RX ADMIN — Medication 100 MILLIGRAM(S): at 08:27

## 2018-09-30 RX ADMIN — Medication 500 MILLIGRAM(S): at 20:56

## 2018-09-30 RX ADMIN — TAMSULOSIN HYDROCHLORIDE 0.4 MILLIGRAM(S): 0.4 CAPSULE ORAL at 20:52

## 2018-09-30 RX ADMIN — ATORVASTATIN CALCIUM 10 MILLIGRAM(S): 80 TABLET, FILM COATED ORAL at 20:53

## 2018-09-30 RX ADMIN — GABAPENTIN 600 MILLIGRAM(S): 400 CAPSULE ORAL at 20:52

## 2018-09-30 RX ADMIN — Medication 5 MILLIGRAM(S): at 08:38

## 2018-09-30 RX ADMIN — Medication 1 APPLICATION(S): at 20:57

## 2018-09-30 RX ADMIN — Medication 1 PUFF(S): at 14:20

## 2018-09-30 RX ADMIN — VALACYCLOVIR 1000 MILLIGRAM(S): 500 TABLET, FILM COATED ORAL at 08:36

## 2018-09-30 RX ADMIN — ARIPIPRAZOLE 12 MILLIGRAM(S): 15 TABLET ORAL at 20:53

## 2018-09-30 RX ADMIN — Medication 120 MILLIGRAM(S): at 08:33

## 2018-09-30 RX ADMIN — BUDESONIDE AND FORMOTEROL FUMARATE DIHYDRATE 2 PUFF(S): 160; 4.5 AEROSOL RESPIRATORY (INHALATION) at 07:58

## 2018-09-30 RX ADMIN — CLOPIDOGREL BISULFATE 75 MILLIGRAM(S): 75 TABLET, FILM COATED ORAL at 08:26

## 2018-09-30 RX ADMIN — Medication 81 MILLIGRAM(S): at 08:31

## 2018-09-30 RX ADMIN — Medication 1 APPLICATION(S): at 08:36

## 2018-09-30 RX ADMIN — Medication 100 MILLIGRAM(S): at 20:52

## 2018-09-30 RX ADMIN — Medication 5 MILLIGRAM(S): at 08:27

## 2018-09-30 RX ADMIN — Medication 500 MILLIGRAM(S): at 08:33

## 2018-09-30 RX ADMIN — METFORMIN HYDROCHLORIDE 500 MILLIGRAM(S): 850 TABLET ORAL at 20:52

## 2018-09-30 NOTE — CHART NOTE - NSCHARTNOTEFT_GEN_A_CORE
Called by RN; last night patient with agitation/aggression and was inflicting self harm as per note, including banging head against the wall.  Today patient with complaints of lethargy, vertigo, and posterior head pain.  Nurse reports patient more sedated than usual and gait is unsteady.  PE reveals STR 5/5 x 4; PERRLA;  Patient on DAPT.  Will check CTH non-con 2/2 trauma and current DAPT treatment.  d/w medicine attending on call.
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
## 2018-09-30 NOTE — PROVIDER CONTACT NOTE (MEDICATION) - BACKGROUND
Pt is admitted for paranoia psychosis and he was recently place on a constant observation due to aggression toward himself.

## 2018-09-30 NOTE — PROVIDER CONTACT NOTE (MEDICATION) - ACTION/TREATMENT ORDERED:
PA will assess patients medical concerns and order appropriate medications
Haldol 5mg, Ativan 2mg, Benadryl 50mg, all IM to the left gluteal muscle. Pt education was reinforced and emotional support was provided. VS are WNL. Constant observation remain in effect.

## 2018-10-01 LAB
GLUCOSE BLDC GLUCOMTR-MCNC: 106 MG/DL — HIGH (ref 70–99)
GLUCOSE BLDC GLUCOMTR-MCNC: 115 MG/DL — HIGH (ref 70–99)
GLUCOSE BLDC GLUCOMTR-MCNC: 126 MG/DL — HIGH (ref 70–99)
GLUCOSE BLDC GLUCOMTR-MCNC: 87 MG/DL — SIGNIFICANT CHANGE UP (ref 70–99)
GLUCOSE BLDC GLUCOMTR-MCNC: 92 MG/DL — SIGNIFICANT CHANGE UP (ref 70–99)

## 2018-10-01 RX ORDER — BACITRACIN ZINC 500 UNIT/G
1 OINTMENT IN PACKET (EA) TOPICAL DAILY
Qty: 0 | Refills: 0 | Status: DISCONTINUED | OUTPATIENT
Start: 2018-10-01 | End: 2018-10-24

## 2018-10-01 RX ADMIN — TAMSULOSIN HYDROCHLORIDE 0.4 MILLIGRAM(S): 0.4 CAPSULE ORAL at 21:19

## 2018-10-01 RX ADMIN — Medication 500 MILLIGRAM(S): at 08:24

## 2018-10-01 RX ADMIN — Medication 1 APPLICATION(S): at 21:45

## 2018-10-01 RX ADMIN — Medication 500 MILLIGRAM(S): at 21:19

## 2018-10-01 RX ADMIN — GABAPENTIN 600 MILLIGRAM(S): 400 CAPSULE ORAL at 21:19

## 2018-10-01 RX ADMIN — Medication 5 MILLIGRAM(S): at 08:21

## 2018-10-01 RX ADMIN — Medication 81 MILLIGRAM(S): at 08:21

## 2018-10-01 RX ADMIN — BUDESONIDE AND FORMOTEROL FUMARATE DIHYDRATE 2 PUFF(S): 160; 4.5 AEROSOL RESPIRATORY (INHALATION) at 21:20

## 2018-10-01 RX ADMIN — METFORMIN HYDROCHLORIDE 500 MILLIGRAM(S): 850 TABLET ORAL at 08:22

## 2018-10-01 RX ADMIN — GABAPENTIN 600 MILLIGRAM(S): 400 CAPSULE ORAL at 08:22

## 2018-10-01 RX ADMIN — BUDESONIDE AND FORMOTEROL FUMARATE DIHYDRATE 2 PUFF(S): 160; 4.5 AEROSOL RESPIRATORY (INHALATION) at 09:55

## 2018-10-01 RX ADMIN — Medication 100 MILLIGRAM(S): at 08:21

## 2018-10-01 RX ADMIN — CLOPIDOGREL BISULFATE 75 MILLIGRAM(S): 75 TABLET, FILM COATED ORAL at 08:21

## 2018-10-01 RX ADMIN — GABAPENTIN 600 MILLIGRAM(S): 400 CAPSULE ORAL at 13:43

## 2018-10-01 RX ADMIN — MONTELUKAST 10 MILLIGRAM(S): 4 TABLET, CHEWABLE ORAL at 08:22

## 2018-10-01 RX ADMIN — Medication 1 APPLICATION(S): at 08:22

## 2018-10-01 RX ADMIN — VALACYCLOVIR 1000 MILLIGRAM(S): 500 TABLET, FILM COATED ORAL at 08:20

## 2018-10-01 RX ADMIN — Medication 5 MILLIGRAM(S): at 21:45

## 2018-10-01 RX ADMIN — ELVITEGRAVIR, COBICISTAT, EMTRICITABINE, AND TENOFOVIR ALAFENAMIDE 1 TABLET(S): 150; 150; 200; 10 TABLET ORAL at 08:19

## 2018-10-01 RX ADMIN — Medication 5 MILLIGRAM(S): at 13:43

## 2018-10-01 RX ADMIN — PANTOPRAZOLE SODIUM 40 MILLIGRAM(S): 20 TABLET, DELAYED RELEASE ORAL at 08:23

## 2018-10-01 RX ADMIN — ATORVASTATIN CALCIUM 10 MILLIGRAM(S): 80 TABLET, FILM COATED ORAL at 21:19

## 2018-10-01 RX ADMIN — HALOPERIDOL DECANOATE 5 MILLIGRAM(S): 100 INJECTION INTRAMUSCULAR at 21:45

## 2018-10-01 RX ADMIN — METFORMIN HYDROCHLORIDE 500 MILLIGRAM(S): 850 TABLET ORAL at 21:19

## 2018-10-01 RX ADMIN — Medication 1 APPLICATION(S): at 09:57

## 2018-10-01 RX ADMIN — Medication 1 PUFF(S): at 21:21

## 2018-10-01 RX ADMIN — DIVALPROEX SODIUM 750 MILLIGRAM(S): 500 TABLET, DELAYED RELEASE ORAL at 21:20

## 2018-10-01 RX ADMIN — Medication 1 PUFF(S): at 09:56

## 2018-10-01 RX ADMIN — Medication 1 SPRAY(S): at 09:57

## 2018-10-01 RX ADMIN — ARIPIPRAZOLE 12 MILLIGRAM(S): 15 TABLET ORAL at 21:19

## 2018-10-01 RX ADMIN — Medication 1 PUFF(S): at 13:43

## 2018-10-01 RX ADMIN — Medication 1 DROP(S): at 09:58

## 2018-10-01 RX ADMIN — Medication 100 MILLIGRAM(S): at 21:19

## 2018-10-01 RX ADMIN — Medication 1 SPRAY(S): at 09:55

## 2018-10-01 RX ADMIN — Medication 5 MILLIGRAM(S): at 08:22

## 2018-10-01 RX ADMIN — Medication 120 MILLIGRAM(S): at 08:21

## 2018-10-01 RX ADMIN — Medication 50 MILLIGRAM(S): at 21:45

## 2018-10-01 NOTE — PROGRESS NOTE BEHAVIORAL HEALTH - SUMMARY
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 Robles [facility]; remote hx of attempted self strangulation; PMH HIV, DVT, atrial fibrillation, DM2, HTN, HLD BIB EMS after he activated 911 [place] [employer] [address] as he was afraid that family members are coming to kill him.      Upon assessment, patient is disorganized in thought processes and endorses persecutory delusions that his family is trying to kill him, he also endorses delusions of carbon monoxide poisoning while at work last week.     Plan is to continue pt's home regimen of medication: UTI diagnosed yesterday in [facility] ED- continue KEFLEX 500MG BID for 9 more days. HIV- Genvoya 150mg qd, HLD-lipitor 10mg hs, DM2 : metoformin 500mg bid, DVT prophylaxis-aspirin 81mg, plavix, oxybutinin 5mg BID, Tamulosin 0.4mg qd, gabapentin 600mg tid    Psychiatric Plan: continue Abilify 12mg qd, diazepam 5mg tid, Depakote ER 750mg qhs, trazodone 50mg prn QHS
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
## 2018-10-02 RX ORDER — ARIPIPRAZOLE 15 MG/1
15 TABLET ORAL AT BEDTIME
Qty: 0 | Refills: 0 | Status: DISCONTINUED | OUTPATIENT
Start: 2018-10-02 | End: 2018-10-05

## 2018-10-02 RX ORDER — HALOPERIDOL DECANOATE 100 MG/ML
5 INJECTION INTRAMUSCULAR ONCE
Qty: 0 | Refills: 0 | Status: COMPLETED | OUTPATIENT
Start: 2018-10-02 | End: 2018-10-02

## 2018-10-02 RX ADMIN — Medication 1 PUFF(S): at 09:12

## 2018-10-02 RX ADMIN — Medication 100 MILLIGRAM(S): at 20:22

## 2018-10-02 RX ADMIN — Medication 5 MILLIGRAM(S): at 12:39

## 2018-10-02 RX ADMIN — BUDESONIDE AND FORMOTEROL FUMARATE DIHYDRATE 2 PUFF(S): 160; 4.5 AEROSOL RESPIRATORY (INHALATION) at 09:19

## 2018-10-02 RX ADMIN — Medication 120 MILLIGRAM(S): at 09:13

## 2018-10-02 RX ADMIN — GABAPENTIN 600 MILLIGRAM(S): 400 CAPSULE ORAL at 20:22

## 2018-10-02 RX ADMIN — HALOPERIDOL DECANOATE 5 MILLIGRAM(S): 100 INJECTION INTRAMUSCULAR at 20:26

## 2018-10-02 RX ADMIN — Medication 50 MILLIGRAM(S): at 20:26

## 2018-10-02 RX ADMIN — Medication 5 MILLIGRAM(S): at 20:23

## 2018-10-02 RX ADMIN — GABAPENTIN 600 MILLIGRAM(S): 400 CAPSULE ORAL at 09:12

## 2018-10-02 RX ADMIN — Medication 5 MILLIGRAM(S): at 20:25

## 2018-10-02 RX ADMIN — HALOPERIDOL DECANOATE 5 MILLIGRAM(S): 100 INJECTION INTRAMUSCULAR at 08:24

## 2018-10-02 RX ADMIN — Medication 2 MILLIGRAM(S): at 08:24

## 2018-10-02 RX ADMIN — Medication 500 MILLIGRAM(S): at 20:24

## 2018-10-02 RX ADMIN — Medication 2 MILLIGRAM(S): at 13:30

## 2018-10-02 RX ADMIN — Medication 5 MILLIGRAM(S): at 09:15

## 2018-10-02 RX ADMIN — Medication 1 PUFF(S): at 12:35

## 2018-10-02 RX ADMIN — METFORMIN HYDROCHLORIDE 500 MILLIGRAM(S): 850 TABLET ORAL at 09:14

## 2018-10-02 RX ADMIN — VALACYCLOVIR 1000 MILLIGRAM(S): 500 TABLET, FILM COATED ORAL at 09:18

## 2018-10-02 RX ADMIN — ELVITEGRAVIR, COBICISTAT, EMTRICITABINE, AND TENOFOVIR ALAFENAMIDE 1 TABLET(S): 150; 150; 200; 10 TABLET ORAL at 09:18

## 2018-10-02 RX ADMIN — DIVALPROEX SODIUM 750 MILLIGRAM(S): 500 TABLET, DELAYED RELEASE ORAL at 20:23

## 2018-10-02 RX ADMIN — Medication 1 DROP(S): at 09:19

## 2018-10-02 RX ADMIN — MONTELUKAST 10 MILLIGRAM(S): 4 TABLET, CHEWABLE ORAL at 09:14

## 2018-10-02 RX ADMIN — Medication 100 MILLIGRAM(S): at 09:12

## 2018-10-02 RX ADMIN — Medication 1 APPLICATION(S): at 09:15

## 2018-10-02 RX ADMIN — GABAPENTIN 600 MILLIGRAM(S): 400 CAPSULE ORAL at 12:39

## 2018-10-02 RX ADMIN — ARIPIPRAZOLE 15 MILLIGRAM(S): 15 TABLET ORAL at 20:25

## 2018-10-02 RX ADMIN — HALOPERIDOL DECANOATE 5 MILLIGRAM(S): 100 INJECTION INTRAMUSCULAR at 13:29

## 2018-10-02 RX ADMIN — Medication 1 APPLICATION(S): at 20:24

## 2018-10-02 RX ADMIN — Medication 5 MILLIGRAM(S): at 09:13

## 2018-10-02 RX ADMIN — CLOPIDOGREL BISULFATE 75 MILLIGRAM(S): 75 TABLET, FILM COATED ORAL at 09:13

## 2018-10-02 RX ADMIN — BUDESONIDE AND FORMOTEROL FUMARATE DIHYDRATE 2 PUFF(S): 160; 4.5 AEROSOL RESPIRATORY (INHALATION) at 20:23

## 2018-10-02 RX ADMIN — Medication 81 MILLIGRAM(S): at 09:13

## 2018-10-02 RX ADMIN — Medication 1 PUFF(S): at 20:33

## 2018-10-02 RX ADMIN — METFORMIN HYDROCHLORIDE 500 MILLIGRAM(S): 850 TABLET ORAL at 20:23

## 2018-10-02 RX ADMIN — ATORVASTATIN CALCIUM 10 MILLIGRAM(S): 80 TABLET, FILM COATED ORAL at 20:24

## 2018-10-02 RX ADMIN — TAMSULOSIN HYDROCHLORIDE 0.4 MILLIGRAM(S): 0.4 CAPSULE ORAL at 20:23

## 2018-10-02 RX ADMIN — Medication 1 SPRAY(S): at 09:12

## 2018-10-02 RX ADMIN — Medication 500 MILLIGRAM(S): at 09:19

## 2018-10-02 NOTE — PROGRESS NOTE BEHAVIORAL HEALTH - MODIFICATIONS
seen/discussed with resident. Will check labs; abilify increased As noted. Pt remains psychotic, and is without insight.  He is paranoid and feels he wants to leave hospital because he will be better off in his apartment.  He is not suicidal; states today's actions were brought on by frustration.
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
## 2018-10-02 NOTE — PROGRESS NOTE BEHAVIORAL HEALTH - CASE SUMMARY
schizoaffective disorder Schizoaffective Disorder; acute exacerbation. Will check labs to r/o HIV involvement; UTI related?

## 2018-10-02 NOTE — PROGRESS NOTE BEHAVIORAL HEALTH - SUMMARY
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 Robles [facility]; remote hx of attempted self strangulation; PMH HIV, DVT, atrial fibrillation, DM2, HTN, HLD BIB EMS after he activated 911 [place] [facility] [address] as he was afraid that family members are coming to kill him.      Upon assessment, patient is disorganized in thought processes and endorses ideas of reference and persecutory delusions that patients are plotting against him and that staff members are trying to kill him.     Plan is to continue pt's home regimen of medication: UTI diagnosed yesterday in [facility] ED- continue KEFLEX 500MG BID for 9 more days. HIV- Genvoya 150mg qd, HLD-lipitor 10mg hs, DM2 : metoformin 500mg bid, DVT prophylaxis-aspirin 81mg, plavix, oxybutinin 5mg BID, Tamulosin 0.4mg qd, gabapentin 600mg tid    Psychiatric Plan: continue Abilify 12mg qd, diazepam 5mg tid, Depakote ER 750mg qhs, trazodone 50mg prn QHS    F/U CBC and BMP. 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 Robles [facility]; remote hx of attempted self strangulation; PMH HIV, DVT, atrial fibrillation, DM2, HTN, HLD BIB EMS after he activated 911 [place] [address] as he was afraid that family members are coming to kill him.      Upon assessment, patient is disorganized in thought processes and endorses ideas of reference and persecutory delusions that patients are plotting against him and that staff members are trying to kill him.     Plan is to continue pt's home regimen of medication: UTI diagnosed yesterday in [facility] ED- continue KEFLEX 500MG BID. For HIV- Genvoya 150mg qd, HLD-lipitor 10mg hs, DM2 : metoformin 500mg bid, DVT prophylaxis-aspirin 81mg, plavix, oxybutinin 5mg BID, Tamulosin 0.4mg qd, gabapentin 600mg tid    Psychiatric Plan: continue Abilify 12mg qd, diazepam 5mg tid, Depakote ER 750mg qhs, trazodone 50mg prn QHS    F/U CBC and BMP. 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 Robles [facility]; remote hx of attempted self strangulation; PMH HIV, DVT, atrial fibrillation, DM2, HTN, HLD BIB EMS after he activated 911 [place] the [address] as he was afraid that family members are coming to kill him.      Upon assessment, patient is disorganized in thought processes and endorses ideas of reference and persecutory delusions that patients are plotting against him and that staff members are trying to kill him.     Plan is to continue pt's home regimen of medication: UTI diagnosed yesterday in [facility] ED- continue KEFLEX 500MG BID. For HIV- Genvoya 150mg qd, HLD-lipitor 10mg hs, DM2 : metoformin 500mg bid, DVT prophylaxis-aspirin 81mg, plavix, oxybutinin 5mg BID, Tamulosin 0.4mg qd, gabapentin 600mg tid    Psychiatric Plan: increase Abilify to 15mg qhs, diazepam 5mg tid, Depakote ER 750mg qhs, trazodone 50mg prn QHS    F/U CBC and BMP, depakote level

## 2018-10-02 NOTE — PROGRESS NOTE BEHAVIORAL HEALTH - NSBHFUPINTERVALHXFT_PSY_A_CORE
Pt seen and examined. He states he is "very frustrated". He states that the "guys in the blue who watch me" are carrying knives and hiding the knives in his room drawer. He states that "they are trying to kill me. And I didn't come here to die". He reports he feels "very scared". He states that this morning in the dining room, he took a plastic knife and stated he was going to "kill myself before they kill me". When asked regarding this incident, he states "I wasn't trying to end my life. If I really wanted to kill myself I wouldn't use a plastic cup. I would jump out of the window and I wouldn't have come here". He reports no thoughts of wanting to end his life. He denies any thoughts of wanting to hurt others. He states that he "is just very frustrated about what's going on" and states that he would like to speak to his outpatient doctors.     Per nursing, patient held plastic knife against his wrist during breakfast and stated "i'm going to kill myself before they kill me". He was given haldol 5mg and ativan 2mg IM and placed in seclusion. Pt seen and examined. He states he is "very frustrated". He states that the "guys in the blue who watch me" are carrying knives and hiding the knives in his room drawer. He states that "they are trying to kill me, and I didn't come here to die". He reports he feels "very scared". He states that this morning in the dining room, he took a plastic knife and stated he was going to "kill myself before they kill me". When asked regarding this incident, he states "I wasn't trying to end my life. If I really wanted to kill myself I wouldn't use a plastic cup. I would jump out of the window and I wouldn't have come here". He reports no thoughts of wanting to end his life. He denies any thoughts of wanting to hurt others. He states that he "is just very frustrated about what's going on" and states that he would like to speak to his outpatient doctors.     Pt was given haldol 5mg and ativan 2mg IM at 8am and placed in seclusion, and released prior to morning interview without incident.

## 2018-10-03 LAB — VALPROATE SERPL-MCNC: 49 UG/ML — LOW (ref 50–100)

## 2018-10-03 RX ORDER — DIVALPROEX SODIUM 500 MG/1
1000 TABLET, DELAYED RELEASE ORAL AT BEDTIME
Qty: 0 | Refills: 0 | Status: DISCONTINUED | OUTPATIENT
Start: 2018-10-03 | End: 2018-10-29

## 2018-10-03 RX ORDER — DIVALPROEX SODIUM 500 MG/1
250 TABLET, DELAYED RELEASE ORAL
Qty: 0 | Refills: 0 | Status: DISCONTINUED | OUTPATIENT
Start: 2018-10-03 | End: 2018-10-22

## 2018-10-03 RX ADMIN — GABAPENTIN 600 MILLIGRAM(S): 400 CAPSULE ORAL at 20:13

## 2018-10-03 RX ADMIN — CLOPIDOGREL BISULFATE 75 MILLIGRAM(S): 75 TABLET, FILM COATED ORAL at 08:38

## 2018-10-03 RX ADMIN — Medication 1 SPRAY(S): at 08:40

## 2018-10-03 RX ADMIN — Medication 500 MILLIGRAM(S): at 08:53

## 2018-10-03 RX ADMIN — BUDESONIDE AND FORMOTEROL FUMARATE DIHYDRATE 2 PUFF(S): 160; 4.5 AEROSOL RESPIRATORY (INHALATION) at 08:55

## 2018-10-03 RX ADMIN — Medication 5 MILLIGRAM(S): at 20:10

## 2018-10-03 RX ADMIN — Medication 5 MILLIGRAM(S): at 20:17

## 2018-10-03 RX ADMIN — BUDESONIDE AND FORMOTEROL FUMARATE DIHYDRATE 2 PUFF(S): 160; 4.5 AEROSOL RESPIRATORY (INHALATION) at 20:20

## 2018-10-03 RX ADMIN — ELVITEGRAVIR, COBICISTAT, EMTRICITABINE, AND TENOFOVIR ALAFENAMIDE 1 TABLET(S): 150; 150; 200; 10 TABLET ORAL at 08:54

## 2018-10-03 RX ADMIN — Medication 1 DROP(S): at 08:53

## 2018-10-03 RX ADMIN — Medication 100 MILLIGRAM(S): at 08:38

## 2018-10-03 RX ADMIN — Medication 5 MILLIGRAM(S): at 08:38

## 2018-10-03 RX ADMIN — Medication 5 MILLIGRAM(S): at 13:05

## 2018-10-03 RX ADMIN — ARIPIPRAZOLE 15 MILLIGRAM(S): 15 TABLET ORAL at 20:12

## 2018-10-03 RX ADMIN — ATORVASTATIN CALCIUM 10 MILLIGRAM(S): 80 TABLET, FILM COATED ORAL at 20:11

## 2018-10-03 RX ADMIN — TAMSULOSIN HYDROCHLORIDE 0.4 MILLIGRAM(S): 0.4 CAPSULE ORAL at 20:11

## 2018-10-03 RX ADMIN — Medication 120 MILLIGRAM(S): at 09:50

## 2018-10-03 RX ADMIN — Medication 5 MILLIGRAM(S): at 08:41

## 2018-10-03 RX ADMIN — Medication 1 APPLICATION(S): at 08:41

## 2018-10-03 RX ADMIN — MONTELUKAST 10 MILLIGRAM(S): 4 TABLET, CHEWABLE ORAL at 08:38

## 2018-10-03 RX ADMIN — GABAPENTIN 600 MILLIGRAM(S): 400 CAPSULE ORAL at 08:38

## 2018-10-03 RX ADMIN — Medication 81 MILLIGRAM(S): at 09:50

## 2018-10-03 RX ADMIN — Medication 500 MILLIGRAM(S): at 20:11

## 2018-10-03 RX ADMIN — METFORMIN HYDROCHLORIDE 500 MILLIGRAM(S): 850 TABLET ORAL at 08:38

## 2018-10-03 RX ADMIN — DIVALPROEX SODIUM 1000 MILLIGRAM(S): 500 TABLET, DELAYED RELEASE ORAL at 20:11

## 2018-10-03 RX ADMIN — VALACYCLOVIR 1000 MILLIGRAM(S): 500 TABLET, FILM COATED ORAL at 08:54

## 2018-10-03 RX ADMIN — METFORMIN HYDROCHLORIDE 500 MILLIGRAM(S): 850 TABLET ORAL at 20:10

## 2018-10-03 RX ADMIN — Medication 1 PUFF(S): at 08:55

## 2018-10-03 RX ADMIN — Medication 100 MILLIGRAM(S): at 20:10

## 2018-10-04 RX ORDER — DIAZEPAM 5 MG
5 TABLET ORAL THREE TIMES A DAY
Qty: 0 | Refills: 0 | Status: DISCONTINUED | OUTPATIENT
Start: 2018-10-04 | End: 2018-10-08

## 2018-10-04 RX ORDER — LIDOCAINE HCL 20 MG/ML
1 VIAL (ML) INJECTION ONCE
Qty: 0 | Refills: 0 | Status: DISCONTINUED | OUTPATIENT
Start: 2018-10-04 | End: 2018-10-29

## 2018-10-04 RX ADMIN — Medication 5 MILLIGRAM(S): at 09:53

## 2018-10-04 RX ADMIN — Medication 50 MILLIGRAM(S): at 20:02

## 2018-10-04 RX ADMIN — Medication 2 MILLIGRAM(S): at 18:19

## 2018-10-04 RX ADMIN — Medication 500 MILLIGRAM(S): at 09:53

## 2018-10-04 RX ADMIN — ATORVASTATIN CALCIUM 10 MILLIGRAM(S): 80 TABLET, FILM COATED ORAL at 20:02

## 2018-10-04 RX ADMIN — Medication 81 MILLIGRAM(S): at 09:53

## 2018-10-04 RX ADMIN — Medication 5 MILLIGRAM(S): at 12:57

## 2018-10-04 RX ADMIN — Medication 100 MILLIGRAM(S): at 09:53

## 2018-10-04 RX ADMIN — Medication 5 MILLIGRAM(S): at 20:02

## 2018-10-04 RX ADMIN — TAMSULOSIN HYDROCHLORIDE 0.4 MILLIGRAM(S): 0.4 CAPSULE ORAL at 20:02

## 2018-10-04 RX ADMIN — Medication 120 MILLIGRAM(S): at 09:53

## 2018-10-04 RX ADMIN — DIVALPROEX SODIUM 1000 MILLIGRAM(S): 500 TABLET, DELAYED RELEASE ORAL at 20:02

## 2018-10-04 RX ADMIN — ELVITEGRAVIR, COBICISTAT, EMTRICITABINE, AND TENOFOVIR ALAFENAMIDE 1 TABLET(S): 150; 150; 200; 10 TABLET ORAL at 16:58

## 2018-10-04 RX ADMIN — METFORMIN HYDROCHLORIDE 500 MILLIGRAM(S): 850 TABLET ORAL at 20:02

## 2018-10-04 RX ADMIN — DIVALPROEX SODIUM 250 MILLIGRAM(S): 500 TABLET, DELAYED RELEASE ORAL at 09:53

## 2018-10-04 RX ADMIN — Medication 5 MILLIGRAM(S): at 10:01

## 2018-10-04 RX ADMIN — Medication 1 APPLICATION(S): at 10:14

## 2018-10-04 RX ADMIN — GABAPENTIN 600 MILLIGRAM(S): 400 CAPSULE ORAL at 10:15

## 2018-10-04 RX ADMIN — GABAPENTIN 600 MILLIGRAM(S): 400 CAPSULE ORAL at 12:57

## 2018-10-04 RX ADMIN — ARIPIPRAZOLE 15 MILLIGRAM(S): 15 TABLET ORAL at 20:02

## 2018-10-04 RX ADMIN — CLOPIDOGREL BISULFATE 75 MILLIGRAM(S): 75 TABLET, FILM COATED ORAL at 10:01

## 2018-10-04 RX ADMIN — GABAPENTIN 600 MILLIGRAM(S): 400 CAPSULE ORAL at 20:02

## 2018-10-04 RX ADMIN — VALACYCLOVIR 1000 MILLIGRAM(S): 500 TABLET, FILM COATED ORAL at 16:59

## 2018-10-04 RX ADMIN — Medication 1 SPRAY(S): at 10:15

## 2018-10-04 RX ADMIN — MONTELUKAST 10 MILLIGRAM(S): 4 TABLET, CHEWABLE ORAL at 09:53

## 2018-10-04 RX ADMIN — Medication 1 APPLICATION(S): at 16:57

## 2018-10-04 RX ADMIN — Medication 100 MILLIGRAM(S): at 20:02

## 2018-10-04 RX ADMIN — PANTOPRAZOLE SODIUM 40 MILLIGRAM(S): 20 TABLET, DELAYED RELEASE ORAL at 10:16

## 2018-10-04 RX ADMIN — METFORMIN HYDROCHLORIDE 500 MILLIGRAM(S): 850 TABLET ORAL at 10:01

## 2018-10-04 RX ADMIN — Medication 650 MILLIGRAM(S): at 18:38

## 2018-10-04 RX ADMIN — Medication 1 APPLICATION(S): at 10:01

## 2018-10-04 RX ADMIN — Medication 1 DROP(S): at 10:01

## 2018-10-04 NOTE — PROGRESS NOTE BEHAVIORAL HEALTH - NSBHFUPINTERVALHXFT_PSY_A_CORE
Pt seen and examined. He reports feeling "fine". He was seen holding a spectra link phone calling multiple mireles. He states he closing his credit card accounts while he is in the hospital because "my cards are at my aunt's house and I don't want anyone trying to take my money". He reports feeling "better". He denies feeling paranoid and states he has been feeling more safe on the unit. He denies any side effects to medication. Per nursing, no acute events reported overnight.

## 2018-10-05 RX ORDER — HALOPERIDOL DECANOATE 100 MG/ML
5 INJECTION INTRAMUSCULAR ONCE
Qty: 0 | Refills: 0 | Status: COMPLETED | OUTPATIENT
Start: 2018-10-05 | End: 2018-10-05

## 2018-10-05 RX ORDER — ARIPIPRAZOLE 15 MG/1
20 TABLET ORAL AT BEDTIME
Qty: 0 | Refills: 0 | Status: DISCONTINUED | OUTPATIENT
Start: 2018-10-05 | End: 2018-10-12

## 2018-10-05 RX ADMIN — MONTELUKAST 10 MILLIGRAM(S): 4 TABLET, CHEWABLE ORAL at 09:04

## 2018-10-05 RX ADMIN — CLOPIDOGREL BISULFATE 75 MILLIGRAM(S): 75 TABLET, FILM COATED ORAL at 09:04

## 2018-10-05 RX ADMIN — Medication 5 MILLIGRAM(S): at 13:24

## 2018-10-05 RX ADMIN — Medication 1 SPRAY(S): at 09:08

## 2018-10-05 RX ADMIN — Medication 100 MILLIGRAM(S): at 21:21

## 2018-10-05 RX ADMIN — ARIPIPRAZOLE 20 MILLIGRAM(S): 15 TABLET ORAL at 21:21

## 2018-10-05 RX ADMIN — Medication 1 DROP(S): at 09:06

## 2018-10-05 RX ADMIN — Medication 5 MILLIGRAM(S): at 21:27

## 2018-10-05 RX ADMIN — PANTOPRAZOLE SODIUM 40 MILLIGRAM(S): 20 TABLET, DELAYED RELEASE ORAL at 09:04

## 2018-10-05 RX ADMIN — GABAPENTIN 600 MILLIGRAM(S): 400 CAPSULE ORAL at 21:21

## 2018-10-05 RX ADMIN — Medication 5 MILLIGRAM(S): at 09:04

## 2018-10-05 RX ADMIN — METFORMIN HYDROCHLORIDE 500 MILLIGRAM(S): 850 TABLET ORAL at 21:21

## 2018-10-05 RX ADMIN — DIVALPROEX SODIUM 1000 MILLIGRAM(S): 500 TABLET, DELAYED RELEASE ORAL at 21:21

## 2018-10-05 RX ADMIN — Medication 120 MILLIGRAM(S): at 09:04

## 2018-10-05 RX ADMIN — GABAPENTIN 600 MILLIGRAM(S): 400 CAPSULE ORAL at 09:04

## 2018-10-05 RX ADMIN — DIVALPROEX SODIUM 250 MILLIGRAM(S): 500 TABLET, DELAYED RELEASE ORAL at 09:06

## 2018-10-05 RX ADMIN — GABAPENTIN 600 MILLIGRAM(S): 400 CAPSULE ORAL at 13:25

## 2018-10-05 RX ADMIN — Medication 1 APPLICATION(S): at 21:27

## 2018-10-05 RX ADMIN — Medication 1 APPLICATION(S): at 09:07

## 2018-10-05 RX ADMIN — Medication 2 MILLIGRAM(S): at 10:47

## 2018-10-05 RX ADMIN — Medication 5 MILLIGRAM(S): at 09:07

## 2018-10-05 RX ADMIN — Medication 5 MILLIGRAM(S): at 21:21

## 2018-10-05 RX ADMIN — ATORVASTATIN CALCIUM 10 MILLIGRAM(S): 80 TABLET, FILM COATED ORAL at 21:21

## 2018-10-05 RX ADMIN — Medication 2 MILLIGRAM(S): at 02:34

## 2018-10-05 RX ADMIN — VALACYCLOVIR 1000 MILLIGRAM(S): 500 TABLET, FILM COATED ORAL at 09:08

## 2018-10-05 RX ADMIN — ELVITEGRAVIR, COBICISTAT, EMTRICITABINE, AND TENOFOVIR ALAFENAMIDE 1 TABLET(S): 150; 150; 200; 10 TABLET ORAL at 09:08

## 2018-10-05 RX ADMIN — Medication 100 MILLIGRAM(S): at 09:04

## 2018-10-05 RX ADMIN — BUDESONIDE AND FORMOTEROL FUMARATE DIHYDRATE 2 PUFF(S): 160; 4.5 AEROSOL RESPIRATORY (INHALATION) at 09:05

## 2018-10-05 RX ADMIN — Medication 81 MILLIGRAM(S): at 09:04

## 2018-10-05 RX ADMIN — Medication 1 APPLICATION(S): at 09:09

## 2018-10-05 RX ADMIN — Medication 1 PUFF(S): at 21:28

## 2018-10-05 RX ADMIN — TAMSULOSIN HYDROCHLORIDE 0.4 MILLIGRAM(S): 0.4 CAPSULE ORAL at 21:21

## 2018-10-05 RX ADMIN — HALOPERIDOL DECANOATE 5 MILLIGRAM(S): 100 INJECTION INTRAMUSCULAR at 10:48

## 2018-10-05 RX ADMIN — METFORMIN HYDROCHLORIDE 500 MILLIGRAM(S): 850 TABLET ORAL at 09:04

## 2018-10-06 RX ADMIN — HALOPERIDOL DECANOATE 5 MILLIGRAM(S): 100 INJECTION INTRAMUSCULAR at 21:53

## 2018-10-06 RX ADMIN — VALACYCLOVIR 1000 MILLIGRAM(S): 500 TABLET, FILM COATED ORAL at 09:59

## 2018-10-06 RX ADMIN — Medication 5 MILLIGRAM(S): at 20:42

## 2018-10-06 RX ADMIN — ARIPIPRAZOLE 20 MILLIGRAM(S): 15 TABLET ORAL at 20:43

## 2018-10-06 RX ADMIN — Medication 1 PUFF(S): at 10:02

## 2018-10-06 RX ADMIN — METFORMIN HYDROCHLORIDE 500 MILLIGRAM(S): 850 TABLET ORAL at 20:42

## 2018-10-06 RX ADMIN — Medication 1 APPLICATION(S): at 09:58

## 2018-10-06 RX ADMIN — Medication 2 MILLIGRAM(S): at 10:49

## 2018-10-06 RX ADMIN — TAMSULOSIN HYDROCHLORIDE 0.4 MILLIGRAM(S): 0.4 CAPSULE ORAL at 20:42

## 2018-10-06 RX ADMIN — Medication 1 PUFF(S): at 10:01

## 2018-10-06 RX ADMIN — Medication 5 MILLIGRAM(S): at 09:58

## 2018-10-06 RX ADMIN — ELVITEGRAVIR, COBICISTAT, EMTRICITABINE, AND TENOFOVIR ALAFENAMIDE 1 TABLET(S): 150; 150; 200; 10 TABLET ORAL at 10:00

## 2018-10-06 RX ADMIN — Medication 650 MILLIGRAM(S): at 13:37

## 2018-10-06 RX ADMIN — Medication 2 MILLIGRAM(S): at 21:53

## 2018-10-06 RX ADMIN — DIVALPROEX SODIUM 1000 MILLIGRAM(S): 500 TABLET, DELAYED RELEASE ORAL at 20:42

## 2018-10-06 RX ADMIN — Medication 100 MILLIGRAM(S): at 20:42

## 2018-10-06 RX ADMIN — Medication 1 DROP(S): at 09:59

## 2018-10-06 RX ADMIN — Medication 5 MILLIGRAM(S): at 09:57

## 2018-10-06 RX ADMIN — Medication 650 MILLIGRAM(S): at 14:32

## 2018-10-06 RX ADMIN — METFORMIN HYDROCHLORIDE 500 MILLIGRAM(S): 850 TABLET ORAL at 09:57

## 2018-10-06 RX ADMIN — Medication 5 MILLIGRAM(S): at 13:09

## 2018-10-06 RX ADMIN — DIVALPROEX SODIUM 250 MILLIGRAM(S): 500 TABLET, DELAYED RELEASE ORAL at 10:00

## 2018-10-06 RX ADMIN — CLOPIDOGREL BISULFATE 75 MILLIGRAM(S): 75 TABLET, FILM COATED ORAL at 09:57

## 2018-10-06 RX ADMIN — Medication 81 MILLIGRAM(S): at 09:58

## 2018-10-06 RX ADMIN — Medication 100 MILLIGRAM(S): at 09:58

## 2018-10-06 RX ADMIN — GABAPENTIN 600 MILLIGRAM(S): 400 CAPSULE ORAL at 20:42

## 2018-10-06 RX ADMIN — BUDESONIDE AND FORMOTEROL FUMARATE DIHYDRATE 2 PUFF(S): 160; 4.5 AEROSOL RESPIRATORY (INHALATION) at 10:02

## 2018-10-06 RX ADMIN — ATORVASTATIN CALCIUM 10 MILLIGRAM(S): 80 TABLET, FILM COATED ORAL at 20:56

## 2018-10-06 RX ADMIN — GABAPENTIN 600 MILLIGRAM(S): 400 CAPSULE ORAL at 13:10

## 2018-10-06 RX ADMIN — MONTELUKAST 10 MILLIGRAM(S): 4 TABLET, CHEWABLE ORAL at 10:01

## 2018-10-06 RX ADMIN — GABAPENTIN 600 MILLIGRAM(S): 400 CAPSULE ORAL at 09:59

## 2018-10-06 RX ADMIN — Medication 120 MILLIGRAM(S): at 09:58

## 2018-10-06 RX ADMIN — PANTOPRAZOLE SODIUM 40 MILLIGRAM(S): 20 TABLET, DELAYED RELEASE ORAL at 09:57

## 2018-10-07 RX ORDER — DIPHENHYDRAMINE HCL 50 MG
50 CAPSULE ORAL ONCE
Qty: 0 | Refills: 0 | Status: COMPLETED | OUTPATIENT
Start: 2018-10-07 | End: 2018-10-11

## 2018-10-07 RX ADMIN — Medication 100 MILLIGRAM(S): at 08:30

## 2018-10-07 RX ADMIN — Medication 100 MILLIGRAM(S): at 20:08

## 2018-10-07 RX ADMIN — ATORVASTATIN CALCIUM 10 MILLIGRAM(S): 80 TABLET, FILM COATED ORAL at 20:08

## 2018-10-07 RX ADMIN — MONTELUKAST 10 MILLIGRAM(S): 4 TABLET, CHEWABLE ORAL at 09:06

## 2018-10-07 RX ADMIN — Medication 1 PUFF(S): at 20:30

## 2018-10-07 RX ADMIN — ARIPIPRAZOLE 20 MILLIGRAM(S): 15 TABLET ORAL at 20:08

## 2018-10-07 RX ADMIN — PANTOPRAZOLE SODIUM 40 MILLIGRAM(S): 20 TABLET, DELAYED RELEASE ORAL at 09:05

## 2018-10-07 RX ADMIN — DIVALPROEX SODIUM 1000 MILLIGRAM(S): 500 TABLET, DELAYED RELEASE ORAL at 20:08

## 2018-10-07 RX ADMIN — TAMSULOSIN HYDROCHLORIDE 0.4 MILLIGRAM(S): 0.4 CAPSULE ORAL at 20:08

## 2018-10-07 RX ADMIN — Medication 5 MILLIGRAM(S): at 20:14

## 2018-10-07 RX ADMIN — Medication 5 MILLIGRAM(S): at 12:59

## 2018-10-07 RX ADMIN — Medication 2 MILLIGRAM(S): at 20:15

## 2018-10-07 RX ADMIN — METFORMIN HYDROCHLORIDE 500 MILLIGRAM(S): 850 TABLET ORAL at 09:07

## 2018-10-07 RX ADMIN — Medication 5 MILLIGRAM(S): at 09:01

## 2018-10-07 RX ADMIN — Medication 1 SPRAY(S): at 08:33

## 2018-10-07 RX ADMIN — Medication 1 APPLICATION(S): at 09:04

## 2018-10-07 RX ADMIN — ALBUTEROL 1 PUFF(S): 90 AEROSOL, METERED ORAL at 20:32

## 2018-10-07 RX ADMIN — Medication 5 MILLIGRAM(S): at 08:27

## 2018-10-07 RX ADMIN — Medication 120 MILLIGRAM(S): at 09:07

## 2018-10-07 RX ADMIN — CLOPIDOGREL BISULFATE 75 MILLIGRAM(S): 75 TABLET, FILM COATED ORAL at 09:08

## 2018-10-07 RX ADMIN — DIVALPROEX SODIUM 250 MILLIGRAM(S): 500 TABLET, DELAYED RELEASE ORAL at 08:29

## 2018-10-07 RX ADMIN — Medication 1 APPLICATION(S): at 20:09

## 2018-10-07 RX ADMIN — GABAPENTIN 600 MILLIGRAM(S): 400 CAPSULE ORAL at 09:04

## 2018-10-07 RX ADMIN — VALACYCLOVIR 1000 MILLIGRAM(S): 500 TABLET, FILM COATED ORAL at 08:32

## 2018-10-07 RX ADMIN — Medication 1 APPLICATION(S): at 09:06

## 2018-10-07 RX ADMIN — Medication 81 MILLIGRAM(S): at 08:26

## 2018-10-07 RX ADMIN — GABAPENTIN 600 MILLIGRAM(S): 400 CAPSULE ORAL at 20:08

## 2018-10-07 RX ADMIN — Medication 5 MILLIGRAM(S): at 20:08

## 2018-10-07 RX ADMIN — Medication 1 DROP(S): at 09:09

## 2018-10-07 RX ADMIN — GABAPENTIN 600 MILLIGRAM(S): 400 CAPSULE ORAL at 12:59

## 2018-10-07 RX ADMIN — Medication 1 APPLICATION(S): at 09:08

## 2018-10-07 RX ADMIN — ELVITEGRAVIR, COBICISTAT, EMTRICITABINE, AND TENOFOVIR ALAFENAMIDE 1 TABLET(S): 150; 150; 200; 10 TABLET ORAL at 08:32

## 2018-10-07 RX ADMIN — METFORMIN HYDROCHLORIDE 500 MILLIGRAM(S): 850 TABLET ORAL at 20:08

## 2018-10-08 RX ORDER — DIAZEPAM 5 MG
5 TABLET ORAL THREE TIMES A DAY
Qty: 0 | Refills: 0 | Status: DISCONTINUED | OUTPATIENT
Start: 2018-10-08 | End: 2018-10-12

## 2018-10-08 RX ADMIN — Medication 1 APPLICATION(S): at 08:05

## 2018-10-08 RX ADMIN — Medication 100 MILLIGRAM(S): at 20:11

## 2018-10-08 RX ADMIN — Medication 5 MILLIGRAM(S): at 08:03

## 2018-10-08 RX ADMIN — Medication 100 MILLIGRAM(S): at 08:02

## 2018-10-08 RX ADMIN — Medication 1 APPLICATION(S): at 20:13

## 2018-10-08 RX ADMIN — MONTELUKAST 10 MILLIGRAM(S): 4 TABLET, CHEWABLE ORAL at 08:02

## 2018-10-08 RX ADMIN — TAMSULOSIN HYDROCHLORIDE 0.4 MILLIGRAM(S): 0.4 CAPSULE ORAL at 20:11

## 2018-10-08 RX ADMIN — Medication 1 PUFF(S): at 08:04

## 2018-10-08 RX ADMIN — DIVALPROEX SODIUM 1000 MILLIGRAM(S): 500 TABLET, DELAYED RELEASE ORAL at 20:11

## 2018-10-08 RX ADMIN — ARIPIPRAZOLE 20 MILLIGRAM(S): 15 TABLET ORAL at 20:11

## 2018-10-08 RX ADMIN — Medication 1 PUFF(S): at 20:12

## 2018-10-08 RX ADMIN — Medication 5 MILLIGRAM(S): at 20:15

## 2018-10-08 RX ADMIN — GABAPENTIN 600 MILLIGRAM(S): 400 CAPSULE ORAL at 12:07

## 2018-10-08 RX ADMIN — ATORVASTATIN CALCIUM 10 MILLIGRAM(S): 80 TABLET, FILM COATED ORAL at 20:11

## 2018-10-08 RX ADMIN — DIVALPROEX SODIUM 250 MILLIGRAM(S): 500 TABLET, DELAYED RELEASE ORAL at 08:03

## 2018-10-08 RX ADMIN — Medication 1 SPRAY(S): at 06:11

## 2018-10-08 RX ADMIN — METFORMIN HYDROCHLORIDE 500 MILLIGRAM(S): 850 TABLET ORAL at 20:11

## 2018-10-08 RX ADMIN — Medication 1 SPRAY(S): at 08:05

## 2018-10-08 RX ADMIN — Medication 81 MILLIGRAM(S): at 08:02

## 2018-10-08 RX ADMIN — Medication 1 APPLICATION(S): at 08:04

## 2018-10-08 RX ADMIN — Medication 5 MILLIGRAM(S): at 12:07

## 2018-10-08 RX ADMIN — BUDESONIDE AND FORMOTEROL FUMARATE DIHYDRATE 2 PUFF(S): 160; 4.5 AEROSOL RESPIRATORY (INHALATION) at 08:03

## 2018-10-08 RX ADMIN — GABAPENTIN 600 MILLIGRAM(S): 400 CAPSULE ORAL at 08:02

## 2018-10-08 RX ADMIN — PANTOPRAZOLE SODIUM 40 MILLIGRAM(S): 20 TABLET, DELAYED RELEASE ORAL at 08:02

## 2018-10-08 RX ADMIN — Medication 1 DROP(S): at 07:10

## 2018-10-08 RX ADMIN — METFORMIN HYDROCHLORIDE 500 MILLIGRAM(S): 850 TABLET ORAL at 08:02

## 2018-10-08 RX ADMIN — Medication 5 MILLIGRAM(S): at 20:11

## 2018-10-08 RX ADMIN — Medication 120 MILLIGRAM(S): at 08:02

## 2018-10-08 RX ADMIN — Medication 1 PUFF(S): at 14:19

## 2018-10-08 RX ADMIN — GABAPENTIN 600 MILLIGRAM(S): 400 CAPSULE ORAL at 20:11

## 2018-10-08 RX ADMIN — Medication 2 MILLIGRAM(S): at 13:45

## 2018-10-08 RX ADMIN — INFLUENZA VIRUS VACCINE 0.5 MILLILITER(S): 15; 15; 15; 15 SUSPENSION INTRAMUSCULAR at 14:22

## 2018-10-08 RX ADMIN — BUDESONIDE AND FORMOTEROL FUMARATE DIHYDRATE 2 PUFF(S): 160; 4.5 AEROSOL RESPIRATORY (INHALATION) at 20:12

## 2018-10-08 RX ADMIN — ELVITEGRAVIR, COBICISTAT, EMTRICITABINE, AND TENOFOVIR ALAFENAMIDE 1 TABLET(S): 150; 150; 200; 10 TABLET ORAL at 08:06

## 2018-10-08 RX ADMIN — VALACYCLOVIR 1000 MILLIGRAM(S): 500 TABLET, FILM COATED ORAL at 08:04

## 2018-10-08 RX ADMIN — CLOPIDOGREL BISULFATE 75 MILLIGRAM(S): 75 TABLET, FILM COATED ORAL at 08:02

## 2018-10-08 RX ADMIN — Medication 5 MILLIGRAM(S): at 08:02

## 2018-10-08 RX ADMIN — Medication 1 DROP(S): at 08:07

## 2018-10-08 NOTE — PROGRESS NOTE BEHAVIORAL HEALTH - SUMMARY
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 Robles [facility]; remote hx of attempted self strangulation; PMH HIV, DVT, atrial fibrillation, DM2, HTN, HLD BIB EMS after he activated 911 [place] the [address] as he was afraid that family members are coming to kill him.      Upon assessment, patient appears more organized and less paranoid.      Plan is to continue pt's home regimen of medication: UTI diagnosed  in [facility] ED- continue KEFLEX 500MG BID. For HIV- Genvoya 150mg qd, HLD-lipitor 10mg hs, DM2 : metoformin 500mg bid, DVT prophylaxis-aspirin 81mg, plavix, oxybutinin 5mg BID, Tamulosin 0.4mg qd, gabapentin 600mg tid    Psychiatric Plan: Continue Abilify 20mg qhs, diazepam 5mg tid, Continue Depakote ER 1000mg qhs and 250mg Qdaily, trazodone 50mg prn QHS

## 2018-10-08 NOTE — PROGRESS NOTE BEHAVIORAL HEALTH - NSBHFUPINTERVALHXFT_PSY_A_CORE
Pt seen and examined. He reports "I'm better". He states that "I told staff I was going to swallow a tooth brush because I wanted attention maybe. They weren't giving me ativan. I'm also paranoid of the male nurses sometimes". He states he was not trying to kill himself by swallowing the tooth brush. He states he did not intend on swallowing the tooth brush. He denies any thoughts of wanting to hurt himself. He states he feels less paranoid today. He reports that he would like to be discharged soon. He denies any side effects to medications. Per nursing, patient tried to swallow a tooth brush on 10/ 6/18. No other acute events reported.

## 2018-10-08 NOTE — PROGRESS NOTE BEHAVIORAL HEALTH - MODIFICATIONS
as noted; seen/discussed with resident.  ?pt with attention seeking behavior in addition to psychosis. will monitor

## 2018-10-09 RX ADMIN — Medication 1 APPLICATION(S): at 08:19

## 2018-10-09 RX ADMIN — GABAPENTIN 600 MILLIGRAM(S): 400 CAPSULE ORAL at 20:26

## 2018-10-09 RX ADMIN — Medication 100 MILLIGRAM(S): at 08:18

## 2018-10-09 RX ADMIN — Medication 5 MILLIGRAM(S): at 20:32

## 2018-10-09 RX ADMIN — CLOPIDOGREL BISULFATE 75 MILLIGRAM(S): 75 TABLET, FILM COATED ORAL at 08:18

## 2018-10-09 RX ADMIN — MONTELUKAST 10 MILLIGRAM(S): 4 TABLET, CHEWABLE ORAL at 08:18

## 2018-10-09 RX ADMIN — METFORMIN HYDROCHLORIDE 500 MILLIGRAM(S): 850 TABLET ORAL at 08:18

## 2018-10-09 RX ADMIN — Medication 2 MILLIGRAM(S): at 00:31

## 2018-10-09 RX ADMIN — Medication 100 MILLIGRAM(S): at 20:26

## 2018-10-09 RX ADMIN — Medication 1 APPLICATION(S): at 08:18

## 2018-10-09 RX ADMIN — Medication 81 MILLIGRAM(S): at 08:18

## 2018-10-09 RX ADMIN — Medication 5 MILLIGRAM(S): at 08:18

## 2018-10-09 RX ADMIN — Medication 1 DROP(S): at 08:23

## 2018-10-09 RX ADMIN — Medication 1 PUFF(S): at 08:24

## 2018-10-09 RX ADMIN — Medication 120 MILLIGRAM(S): at 08:19

## 2018-10-09 RX ADMIN — Medication 1 DROP(S): at 08:21

## 2018-10-09 RX ADMIN — Medication 1 PUFF(S): at 13:13

## 2018-10-09 RX ADMIN — GABAPENTIN 600 MILLIGRAM(S): 400 CAPSULE ORAL at 08:19

## 2018-10-09 RX ADMIN — METFORMIN HYDROCHLORIDE 500 MILLIGRAM(S): 850 TABLET ORAL at 20:26

## 2018-10-09 RX ADMIN — Medication 5 MILLIGRAM(S): at 13:12

## 2018-10-09 RX ADMIN — DIVALPROEX SODIUM 1000 MILLIGRAM(S): 500 TABLET, DELAYED RELEASE ORAL at 20:31

## 2018-10-09 RX ADMIN — Medication 1 PUFF(S): at 20:34

## 2018-10-09 RX ADMIN — ATORVASTATIN CALCIUM 10 MILLIGRAM(S): 80 TABLET, FILM COATED ORAL at 20:30

## 2018-10-09 RX ADMIN — Medication 1 APPLICATION(S): at 20:29

## 2018-10-09 RX ADMIN — ARIPIPRAZOLE 20 MILLIGRAM(S): 15 TABLET ORAL at 20:30

## 2018-10-09 RX ADMIN — BUDESONIDE AND FORMOTEROL FUMARATE DIHYDRATE 2 PUFF(S): 160; 4.5 AEROSOL RESPIRATORY (INHALATION) at 08:23

## 2018-10-09 RX ADMIN — VALACYCLOVIR 1000 MILLIGRAM(S): 500 TABLET, FILM COATED ORAL at 08:23

## 2018-10-09 RX ADMIN — GABAPENTIN 600 MILLIGRAM(S): 400 CAPSULE ORAL at 13:12

## 2018-10-09 RX ADMIN — Medication 1 SPRAY(S): at 08:21

## 2018-10-09 RX ADMIN — DIVALPROEX SODIUM 250 MILLIGRAM(S): 500 TABLET, DELAYED RELEASE ORAL at 08:19

## 2018-10-09 RX ADMIN — BUDESONIDE AND FORMOTEROL FUMARATE DIHYDRATE 2 PUFF(S): 160; 4.5 AEROSOL RESPIRATORY (INHALATION) at 20:27

## 2018-10-09 RX ADMIN — Medication 1 SPRAY(S): at 08:18

## 2018-10-09 RX ADMIN — TAMSULOSIN HYDROCHLORIDE 0.4 MILLIGRAM(S): 0.4 CAPSULE ORAL at 20:26

## 2018-10-09 RX ADMIN — PANTOPRAZOLE SODIUM 40 MILLIGRAM(S): 20 TABLET, DELAYED RELEASE ORAL at 08:18

## 2018-10-09 RX ADMIN — Medication 5 MILLIGRAM(S): at 08:20

## 2018-10-09 RX ADMIN — ELVITEGRAVIR, COBICISTAT, EMTRICITABINE, AND TENOFOVIR ALAFENAMIDE 1 TABLET(S): 150; 150; 200; 10 TABLET ORAL at 08:25

## 2018-10-09 RX ADMIN — Medication 5 MILLIGRAM(S): at 20:29

## 2018-10-09 RX ADMIN — Medication 1 APPLICATION(S): at 08:20

## 2018-10-09 RX ADMIN — Medication 2 MILLIGRAM(S): at 17:15

## 2018-10-09 NOTE — PROGRESS NOTE BEHAVIORAL HEALTH - SUMMARY
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 Robles [facility]; remote hx of attempted self strangulation; PMH HIV, DVT, atrial fibrillation, DM2, HTN, HLD BIB EMS after he activated 911 [place] the [address] as he was afraid that family members are coming to kill him.      Upon assessment, patient appears more organized and less paranoid.      Plan is to continue pt's home regimen of medication: UTI diagnosed  in [facility] ED- continue KEFLEX 500MG BID. For HIV- Genvoya 150mg qd, HLD-lipitor 10mg hs, DM2 : metoformin 500mg bid, DVT prophylaxis-aspirin 81mg, plavix, oxybutinin 5mg BID, Tamulosin 0.4mg qd, gabapentin 600mg tid    Psychiatric Plan: Continue Abilify 20mg qhs, diazepam 5mg tid, Continue Depakote ER 1000mg qhs and 250mg Qdaily, trazodone 50mg prn QHS

## 2018-10-09 NOTE — CHART NOTE - NSCHARTNOTEFT_GEN_A_CORE
Social Work Note:    Patient continues to present as paranoid.  He denied wanting to hurt himself after allegedly attempting to swallow a toothbrush.     Plan is for patient to resume outpatient mental health treatment with his current provider.      At this time patient is not psychiatrically stable for discharge.

## 2018-10-09 NOTE — PROGRESS NOTE BEHAVIORAL HEALTH - NSBHFUPINTERVALHXFT_PSY_A_CORE
Pt seen and examined. He reports "I'm ok". He states that last night, he did not trust the PCA who was watching him and stated "I knew he was up to no good. I'm not paranoid. I just know people". He denies any other acute complaints at this time. He denies side effects to medication. Per nursing,  no acute events reported.

## 2018-10-10 RX ADMIN — METFORMIN HYDROCHLORIDE 500 MILLIGRAM(S): 850 TABLET ORAL at 08:12

## 2018-10-10 RX ADMIN — Medication 1 PUFF(S): at 08:16

## 2018-10-10 RX ADMIN — Medication 1 APPLICATION(S): at 08:12

## 2018-10-10 RX ADMIN — BUDESONIDE AND FORMOTEROL FUMARATE DIHYDRATE 2 PUFF(S): 160; 4.5 AEROSOL RESPIRATORY (INHALATION) at 08:14

## 2018-10-10 RX ADMIN — TAMSULOSIN HYDROCHLORIDE 0.4 MILLIGRAM(S): 0.4 CAPSULE ORAL at 20:04

## 2018-10-10 RX ADMIN — Medication 1 SPRAY(S): at 08:12

## 2018-10-10 RX ADMIN — Medication 5 MILLIGRAM(S): at 08:17

## 2018-10-10 RX ADMIN — METFORMIN HYDROCHLORIDE 500 MILLIGRAM(S): 850 TABLET ORAL at 20:05

## 2018-10-10 RX ADMIN — MONTELUKAST 10 MILLIGRAM(S): 4 TABLET, CHEWABLE ORAL at 08:11

## 2018-10-10 RX ADMIN — DIVALPROEX SODIUM 1000 MILLIGRAM(S): 500 TABLET, DELAYED RELEASE ORAL at 20:04

## 2018-10-10 RX ADMIN — DIVALPROEX SODIUM 250 MILLIGRAM(S): 500 TABLET, DELAYED RELEASE ORAL at 08:11

## 2018-10-10 RX ADMIN — Medication 5 MILLIGRAM(S): at 20:04

## 2018-10-10 RX ADMIN — ATORVASTATIN CALCIUM 10 MILLIGRAM(S): 80 TABLET, FILM COATED ORAL at 20:04

## 2018-10-10 RX ADMIN — GABAPENTIN 600 MILLIGRAM(S): 400 CAPSULE ORAL at 12:00

## 2018-10-10 RX ADMIN — ARIPIPRAZOLE 20 MILLIGRAM(S): 15 TABLET ORAL at 20:05

## 2018-10-10 RX ADMIN — Medication 120 MILLIGRAM(S): at 08:11

## 2018-10-10 RX ADMIN — Medication 1 DROP(S): at 08:14

## 2018-10-10 RX ADMIN — Medication 1 DROP(S): at 08:12

## 2018-10-10 RX ADMIN — PANTOPRAZOLE SODIUM 40 MILLIGRAM(S): 20 TABLET, DELAYED RELEASE ORAL at 08:11

## 2018-10-10 RX ADMIN — VALACYCLOVIR 1000 MILLIGRAM(S): 500 TABLET, FILM COATED ORAL at 08:15

## 2018-10-10 RX ADMIN — BUDESONIDE AND FORMOTEROL FUMARATE DIHYDRATE 2 PUFF(S): 160; 4.5 AEROSOL RESPIRATORY (INHALATION) at 20:06

## 2018-10-10 RX ADMIN — Medication 100 MILLIGRAM(S): at 20:04

## 2018-10-10 RX ADMIN — Medication 1 APPLICATION(S): at 20:05

## 2018-10-10 RX ADMIN — Medication 1 PUFF(S): at 20:06

## 2018-10-10 RX ADMIN — Medication 100 MILLIGRAM(S): at 08:11

## 2018-10-10 RX ADMIN — Medication 81 MILLIGRAM(S): at 08:12

## 2018-10-10 RX ADMIN — GABAPENTIN 600 MILLIGRAM(S): 400 CAPSULE ORAL at 08:11

## 2018-10-10 RX ADMIN — ELVITEGRAVIR, COBICISTAT, EMTRICITABINE, AND TENOFOVIR ALAFENAMIDE 1 TABLET(S): 150; 150; 200; 10 TABLET ORAL at 08:15

## 2018-10-10 RX ADMIN — CLOPIDOGREL BISULFATE 75 MILLIGRAM(S): 75 TABLET, FILM COATED ORAL at 08:11

## 2018-10-10 RX ADMIN — GABAPENTIN 600 MILLIGRAM(S): 400 CAPSULE ORAL at 20:04

## 2018-10-10 RX ADMIN — Medication 5 MILLIGRAM(S): at 08:11

## 2018-10-10 RX ADMIN — Medication 5 MILLIGRAM(S): at 12:00

## 2018-10-10 RX ADMIN — Medication 2 MILLIGRAM(S): at 13:43

## 2018-10-11 LAB — GLUCOSE BLDC GLUCOMTR-MCNC: 93 MG/DL — SIGNIFICANT CHANGE UP (ref 70–99)

## 2018-10-11 RX ORDER — DIPHENHYDRAMINE HCL 50 MG
50 CAPSULE ORAL ONCE
Qty: 0 | Refills: 0 | Status: COMPLETED | OUTPATIENT
Start: 2018-10-11 | End: 2018-10-11

## 2018-10-11 RX ORDER — DIPHENHYDRAMINE HCL 50 MG
50 CAPSULE ORAL ONCE
Qty: 0 | Refills: 0 | Status: DISCONTINUED | OUTPATIENT
Start: 2018-10-11 | End: 2018-10-29

## 2018-10-11 RX ORDER — HALOPERIDOL DECANOATE 100 MG/ML
5 INJECTION INTRAMUSCULAR ONCE
Qty: 0 | Refills: 0 | Status: COMPLETED | OUTPATIENT
Start: 2018-10-11 | End: 2018-10-11

## 2018-10-11 RX ORDER — HYDROXYZINE HCL 10 MG
50 TABLET ORAL EVERY 6 HOURS
Qty: 0 | Refills: 0 | Status: DISCONTINUED | OUTPATIENT
Start: 2018-10-11 | End: 2018-10-29

## 2018-10-11 RX ORDER — HALOPERIDOL DECANOATE 100 MG/ML
5 INJECTION INTRAMUSCULAR DAILY
Qty: 0 | Refills: 0 | Status: DISCONTINUED | OUTPATIENT
Start: 2018-10-11 | End: 2018-10-12

## 2018-10-11 RX ADMIN — GABAPENTIN 600 MILLIGRAM(S): 400 CAPSULE ORAL at 20:09

## 2018-10-11 RX ADMIN — Medication 100 MILLIGRAM(S): at 20:11

## 2018-10-11 RX ADMIN — GABAPENTIN 600 MILLIGRAM(S): 400 CAPSULE ORAL at 14:43

## 2018-10-11 RX ADMIN — Medication 50 MILLIGRAM(S): at 11:13

## 2018-10-11 RX ADMIN — ARIPIPRAZOLE 20 MILLIGRAM(S): 15 TABLET ORAL at 20:09

## 2018-10-11 RX ADMIN — Medication 1 SPRAY(S): at 08:00

## 2018-10-11 RX ADMIN — ATORVASTATIN CALCIUM 10 MILLIGRAM(S): 80 TABLET, FILM COATED ORAL at 20:09

## 2018-10-11 RX ADMIN — DIVALPROEX SODIUM 250 MILLIGRAM(S): 500 TABLET, DELAYED RELEASE ORAL at 08:01

## 2018-10-11 RX ADMIN — GABAPENTIN 600 MILLIGRAM(S): 400 CAPSULE ORAL at 08:01

## 2018-10-11 RX ADMIN — HALOPERIDOL DECANOATE 5 MILLIGRAM(S): 100 INJECTION INTRAMUSCULAR at 15:36

## 2018-10-11 RX ADMIN — Medication 50 MILLIGRAM(S): at 22:12

## 2018-10-11 RX ADMIN — DIVALPROEX SODIUM 1000 MILLIGRAM(S): 500 TABLET, DELAYED RELEASE ORAL at 20:09

## 2018-10-11 RX ADMIN — Medication 120 MILLIGRAM(S): at 08:01

## 2018-10-11 RX ADMIN — Medication 5 MILLIGRAM(S): at 20:09

## 2018-10-11 RX ADMIN — Medication 1 DROP(S): at 08:15

## 2018-10-11 RX ADMIN — Medication 2 MILLIGRAM(S): at 22:12

## 2018-10-11 RX ADMIN — Medication 5 MILLIGRAM(S): at 14:43

## 2018-10-11 RX ADMIN — Medication 1 PUFF(S): at 20:10

## 2018-10-11 RX ADMIN — VALACYCLOVIR 1000 MILLIGRAM(S): 500 TABLET, FILM COATED ORAL at 08:15

## 2018-10-11 RX ADMIN — Medication 5 MILLIGRAM(S): at 08:02

## 2018-10-11 RX ADMIN — CLOPIDOGREL BISULFATE 75 MILLIGRAM(S): 75 TABLET, FILM COATED ORAL at 08:02

## 2018-10-11 RX ADMIN — Medication 1 DROP(S): at 08:01

## 2018-10-11 RX ADMIN — ELVITEGRAVIR, COBICISTAT, EMTRICITABINE, AND TENOFOVIR ALAFENAMIDE 1 TABLET(S): 150; 150; 200; 10 TABLET ORAL at 08:15

## 2018-10-11 RX ADMIN — MONTELUKAST 10 MILLIGRAM(S): 4 TABLET, CHEWABLE ORAL at 08:01

## 2018-10-11 RX ADMIN — HALOPERIDOL DECANOATE 5 MILLIGRAM(S): 100 INJECTION INTRAMUSCULAR at 22:12

## 2018-10-11 RX ADMIN — Medication 1 PUFF(S): at 08:00

## 2018-10-11 RX ADMIN — METFORMIN HYDROCHLORIDE 500 MILLIGRAM(S): 850 TABLET ORAL at 08:01

## 2018-10-11 RX ADMIN — PANTOPRAZOLE SODIUM 40 MILLIGRAM(S): 20 TABLET, DELAYED RELEASE ORAL at 07:20

## 2018-10-11 RX ADMIN — HALOPERIDOL DECANOATE 5 MILLIGRAM(S): 100 INJECTION INTRAMUSCULAR at 17:01

## 2018-10-11 RX ADMIN — TAMSULOSIN HYDROCHLORIDE 0.4 MILLIGRAM(S): 0.4 CAPSULE ORAL at 20:09

## 2018-10-11 RX ADMIN — BUDESONIDE AND FORMOTEROL FUMARATE DIHYDRATE 2 PUFF(S): 160; 4.5 AEROSOL RESPIRATORY (INHALATION) at 20:10

## 2018-10-11 RX ADMIN — Medication 1 APPLICATION(S): at 20:11

## 2018-10-11 RX ADMIN — BUDESONIDE AND FORMOTEROL FUMARATE DIHYDRATE 2 PUFF(S): 160; 4.5 AEROSOL RESPIRATORY (INHALATION) at 08:00

## 2018-10-11 RX ADMIN — Medication 5 MILLIGRAM(S): at 20:13

## 2018-10-11 RX ADMIN — Medication 100 MILLIGRAM(S): at 08:01

## 2018-10-11 RX ADMIN — Medication 5 MILLIGRAM(S): at 08:01

## 2018-10-11 RX ADMIN — Medication 50 MILLIGRAM(S): at 15:35

## 2018-10-11 RX ADMIN — METFORMIN HYDROCHLORIDE 500 MILLIGRAM(S): 850 TABLET ORAL at 20:09

## 2018-10-11 NOTE — PROGRESS NOTE BEHAVIORAL HEALTH - SUMMARY
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 Robles [facility]; remote hx of attempted self strangulation; PMH HIV, DVT, atrial fibrillation, DM2, HTN, HLD BIB EMS after he activated 911 [place] the [address] as he was afraid that family members are coming to kill him.      Upon assessment, patient appears more paranoid and less organized.     Psychiatric Plan: Continue Abilify 20mg qhs, diazepam 5mg tid, Continue Depakote ER 1000mg qhs and 250mg Qdaily, trazodone 50mg prn QHS, and start haldol 5mg Qdaily (5pm)    Plan is to continue pt's home regimen of medication:  Genvoya 150mg qd, HLD-lipitor 10mg hs, DM2 : metoformin 500mg bid, DVT prophylaxis-aspirin 81mg, plavix, oxybutinin 5mg BID, Tamulosin 0.4mg qd, gabapentin 600mg tid

## 2018-10-11 NOTE — PROVIDER CONTACT NOTE (CHANGE IN STATUS NOTIFICATION) - RECOMMENDATIONS
Patient still on constant observation (1:1), all blankets and sheets removed from patient's room, more positive interaction with staff during the day

## 2018-10-11 NOTE — CHART NOTE - NSCHARTNOTEFT_GEN_A_CORE
Called by staff stating pt tied a sheet around his neck to attempt strangulation. Pt then started flailing his head around. Pt now with c/o pain in posterior neck.    Exam: VSS  Neck without ecchymosis, reproducible pain on sides of neck posteriorly.    Plan: 1. M/S neck pain - treat with motrin PRN
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
## 2018-10-11 NOTE — PROVIDER CONTACT NOTE (CHANGE IN STATUS NOTIFICATION) - ACTION/TREATMENT ORDERED:
STAT IM doses of Haldol 5mg/ml and Benadryl 50mg/ml. Standing order of Haldol 5mg daily at 5PM to begin today.
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
## 2018-10-11 NOTE — CHART NOTE - NSCHARTNOTEFT_GEN_A_CORE
Social Work Note:    Patient is on a 1:1 for safety.  He remains disorganized and delusional.     Plan is for patient to return to his home in the community and resume outpatient mental health treatment with his private psychiatrist.     At this time patient is not psychiatrically stable for discharge.

## 2018-10-12 LAB
GLUCOSE BLDC GLUCOMTR-MCNC: 89 MG/DL — SIGNIFICANT CHANGE UP (ref 70–99)
VALPROATE SERPL-MCNC: 51 UG/ML — SIGNIFICANT CHANGE UP (ref 50–100)

## 2018-10-12 RX ORDER — DIAZEPAM 5 MG
5 TABLET ORAL THREE TIMES A DAY
Qty: 0 | Refills: 0 | Status: DISCONTINUED | OUTPATIENT
Start: 2018-10-12 | End: 2018-10-16

## 2018-10-12 RX ORDER — ARIPIPRAZOLE 15 MG/1
15 TABLET ORAL AT BEDTIME
Qty: 0 | Refills: 0 | Status: DISCONTINUED | OUTPATIENT
Start: 2018-10-13 | End: 2018-10-22

## 2018-10-12 RX ORDER — HALOPERIDOL DECANOATE 100 MG/ML
5 INJECTION INTRAMUSCULAR DAILY
Qty: 0 | Refills: 0 | Status: COMPLETED | OUTPATIENT
Start: 2018-10-12 | End: 2018-10-12

## 2018-10-12 RX ORDER — HALOPERIDOL DECANOATE 100 MG/ML
5 INJECTION INTRAMUSCULAR EVERY 12 HOURS
Qty: 0 | Refills: 0 | Status: DISCONTINUED | OUTPATIENT
Start: 2018-10-13 | End: 2018-10-15

## 2018-10-12 RX ORDER — ARIPIPRAZOLE 15 MG/1
20 TABLET ORAL AT BEDTIME
Qty: 0 | Refills: 0 | Status: COMPLETED | OUTPATIENT
Start: 2018-10-12 | End: 2018-10-12

## 2018-10-12 RX ADMIN — HALOPERIDOL DECANOATE 5 MILLIGRAM(S): 100 INJECTION INTRAMUSCULAR at 08:13

## 2018-10-12 RX ADMIN — PANTOPRAZOLE SODIUM 40 MILLIGRAM(S): 20 TABLET, DELAYED RELEASE ORAL at 08:07

## 2018-10-12 RX ADMIN — Medication 1 DROP(S): at 08:08

## 2018-10-12 RX ADMIN — Medication 1 DROP(S): at 08:14

## 2018-10-12 RX ADMIN — BUDESONIDE AND FORMOTEROL FUMARATE DIHYDRATE 2 PUFF(S): 160; 4.5 AEROSOL RESPIRATORY (INHALATION) at 08:10

## 2018-10-12 RX ADMIN — HALOPERIDOL DECANOATE 5 MILLIGRAM(S): 100 INJECTION INTRAMUSCULAR at 02:20

## 2018-10-12 RX ADMIN — Medication 5 MILLIGRAM(S): at 08:07

## 2018-10-12 RX ADMIN — HALOPERIDOL DECANOATE 5 MILLIGRAM(S): 100 INJECTION INTRAMUSCULAR at 17:44

## 2018-10-12 RX ADMIN — Medication 1 APPLICATION(S): at 08:07

## 2018-10-12 RX ADMIN — METFORMIN HYDROCHLORIDE 500 MILLIGRAM(S): 850 TABLET ORAL at 08:07

## 2018-10-12 RX ADMIN — GABAPENTIN 600 MILLIGRAM(S): 400 CAPSULE ORAL at 12:23

## 2018-10-12 RX ADMIN — CLOPIDOGREL BISULFATE 75 MILLIGRAM(S): 75 TABLET, FILM COATED ORAL at 08:06

## 2018-10-12 RX ADMIN — ARIPIPRAZOLE 20 MILLIGRAM(S): 15 TABLET ORAL at 20:03

## 2018-10-12 RX ADMIN — Medication 1 SPRAY(S): at 08:09

## 2018-10-12 RX ADMIN — Medication 1 SPRAY(S): at 08:08

## 2018-10-12 RX ADMIN — MONTELUKAST 10 MILLIGRAM(S): 4 TABLET, CHEWABLE ORAL at 08:13

## 2018-10-12 RX ADMIN — Medication 5 MILLIGRAM(S): at 20:02

## 2018-10-12 RX ADMIN — HALOPERIDOL DECANOATE 5 MILLIGRAM(S): 100 INJECTION INTRAMUSCULAR at 20:24

## 2018-10-12 RX ADMIN — Medication 100 MILLIGRAM(S): at 08:07

## 2018-10-12 RX ADMIN — DIVALPROEX SODIUM 250 MILLIGRAM(S): 500 TABLET, DELAYED RELEASE ORAL at 08:06

## 2018-10-12 RX ADMIN — GABAPENTIN 600 MILLIGRAM(S): 400 CAPSULE ORAL at 08:07

## 2018-10-12 RX ADMIN — Medication 120 MILLIGRAM(S): at 08:06

## 2018-10-12 RX ADMIN — METFORMIN HYDROCHLORIDE 500 MILLIGRAM(S): 850 TABLET ORAL at 20:02

## 2018-10-12 RX ADMIN — Medication 50 MILLIGRAM(S): at 02:20

## 2018-10-12 RX ADMIN — DIVALPROEX SODIUM 1000 MILLIGRAM(S): 500 TABLET, DELAYED RELEASE ORAL at 20:03

## 2018-10-12 RX ADMIN — Medication 5 MILLIGRAM(S): at 12:23

## 2018-10-12 RX ADMIN — VALACYCLOVIR 1000 MILLIGRAM(S): 500 TABLET, FILM COATED ORAL at 08:11

## 2018-10-12 RX ADMIN — Medication 1 PUFF(S): at 08:11

## 2018-10-12 RX ADMIN — Medication 2 MILLIGRAM(S): at 16:05

## 2018-10-12 RX ADMIN — Medication 100 MILLIGRAM(S): at 20:03

## 2018-10-12 RX ADMIN — Medication 50 MILLIGRAM(S): at 20:24

## 2018-10-12 RX ADMIN — ATORVASTATIN CALCIUM 10 MILLIGRAM(S): 80 TABLET, FILM COATED ORAL at 20:03

## 2018-10-12 RX ADMIN — TAMSULOSIN HYDROCHLORIDE 0.4 MILLIGRAM(S): 0.4 CAPSULE ORAL at 20:02

## 2018-10-12 RX ADMIN — ELVITEGRAVIR, COBICISTAT, EMTRICITABINE, AND TENOFOVIR ALAFENAMIDE 1 TABLET(S): 150; 150; 200; 10 TABLET ORAL at 08:10

## 2018-10-12 RX ADMIN — Medication 81 MILLIGRAM(S): at 08:06

## 2018-10-12 RX ADMIN — GABAPENTIN 600 MILLIGRAM(S): 400 CAPSULE ORAL at 20:03

## 2018-10-12 NOTE — PROGRESS NOTE BEHAVIORAL HEALTH - NSBHFUPINTERVALHXFT_PSY_A_CORE
Pt seen and examined. He reports "I feel better now". He states that he continues to hear his mother's voice "telling me to join her". He states he is trying to ignore the voice. He states he hears her voice once every few months. He reports that 4 months ago he tried to crack a bottle over his head in a suicide attempt after hearing his mother's voice to tell him to do so. He reports that he "never told my psychiatrist because I didn't want to get admitted". He admits he feels slightly paranoid "but not as bad as yesterday". He denies any other acute complaints at this time. Per nursing, patient tried to choke himself last night and was stopped by his 1-1.

## 2018-10-12 NOTE — PROGRESS NOTE BEHAVIORAL HEALTH - SUMMARY
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 Robles [facility]; remote hx of attempted self strangulation; PMH HIV, DVT, atrial fibrillation, DM2, HTN, HLD BIB EMS after he activated [facility] [place] [address] as he was afraid that family members are coming to kill him.      Upon assessment, patient is slightly less paranoid but is not responding to internal stimuli i.e. command hallucinations telling him to hurt himself.     Psychiatric Plan: Decrease abilify to 15mg QHS starting tomorrow 10/13 and increase haldol to 5mg BID, continue diazepam 5mg tid, Continue Depakote ER 1000mg qhs and 250mg Qdaily, trazodone 50mg prn QHS,     Plan is to continue pt's home regimen of medication:  Genvoya 150mg qd, HLD-lipitor 10mg hs, DM2 : metoformin 500mg bid, DVT prophylaxis-aspirin 81mg, plavix, oxybutinin 5mg BID, Tamulosin 0.4mg qd, gabapentin 600mg tid

## 2018-10-13 LAB — GLUCOSE BLDC GLUCOMTR-MCNC: 260 MG/DL — HIGH (ref 70–99)

## 2018-10-13 RX ORDER — DIPHENHYDRAMINE HCL 50 MG
50 CAPSULE ORAL ONCE
Qty: 0 | Refills: 0 | Status: DISCONTINUED | OUTPATIENT
Start: 2018-10-13 | End: 2018-10-13

## 2018-10-13 RX ORDER — HALOPERIDOL DECANOATE 100 MG/ML
5 INJECTION INTRAMUSCULAR ONCE
Qty: 0 | Refills: 0 | Status: COMPLETED | OUTPATIENT
Start: 2018-10-13 | End: 2018-10-13

## 2018-10-13 RX ADMIN — METFORMIN HYDROCHLORIDE 500 MILLIGRAM(S): 850 TABLET ORAL at 20:37

## 2018-10-13 RX ADMIN — GABAPENTIN 600 MILLIGRAM(S): 400 CAPSULE ORAL at 12:16

## 2018-10-13 RX ADMIN — Medication 1 APPLICATION(S): at 08:17

## 2018-10-13 RX ADMIN — HALOPERIDOL DECANOATE 5 MILLIGRAM(S): 100 INJECTION INTRAMUSCULAR at 08:17

## 2018-10-13 RX ADMIN — Medication 120 MILLIGRAM(S): at 08:18

## 2018-10-13 RX ADMIN — Medication 50 MILLIGRAM(S): at 02:12

## 2018-10-13 RX ADMIN — METFORMIN HYDROCHLORIDE 500 MILLIGRAM(S): 850 TABLET ORAL at 08:18

## 2018-10-13 RX ADMIN — BUDESONIDE AND FORMOTEROL FUMARATE DIHYDRATE 2 PUFF(S): 160; 4.5 AEROSOL RESPIRATORY (INHALATION) at 08:16

## 2018-10-13 RX ADMIN — Medication 100 MILLIGRAM(S): at 08:18

## 2018-10-13 RX ADMIN — DIVALPROEX SODIUM 250 MILLIGRAM(S): 500 TABLET, DELAYED RELEASE ORAL at 08:18

## 2018-10-13 RX ADMIN — Medication 1 PUFF(S): at 08:16

## 2018-10-13 RX ADMIN — Medication 5 MILLIGRAM(S): at 08:17

## 2018-10-13 RX ADMIN — PANTOPRAZOLE SODIUM 40 MILLIGRAM(S): 20 TABLET, DELAYED RELEASE ORAL at 08:18

## 2018-10-13 RX ADMIN — CLOPIDOGREL BISULFATE 75 MILLIGRAM(S): 75 TABLET, FILM COATED ORAL at 08:17

## 2018-10-13 RX ADMIN — DIVALPROEX SODIUM 1000 MILLIGRAM(S): 500 TABLET, DELAYED RELEASE ORAL at 20:37

## 2018-10-13 RX ADMIN — Medication 5 MILLIGRAM(S): at 08:18

## 2018-10-13 RX ADMIN — MONTELUKAST 10 MILLIGRAM(S): 4 TABLET, CHEWABLE ORAL at 08:18

## 2018-10-13 RX ADMIN — VALACYCLOVIR 1000 MILLIGRAM(S): 500 TABLET, FILM COATED ORAL at 08:19

## 2018-10-13 RX ADMIN — Medication 50 MILLIGRAM(S): at 02:28

## 2018-10-13 RX ADMIN — Medication 5 MILLIGRAM(S): at 12:16

## 2018-10-13 RX ADMIN — HALOPERIDOL DECANOATE 5 MILLIGRAM(S): 100 INJECTION INTRAMUSCULAR at 17:15

## 2018-10-13 RX ADMIN — HALOPERIDOL DECANOATE 5 MILLIGRAM(S): 100 INJECTION INTRAMUSCULAR at 22:24

## 2018-10-13 RX ADMIN — Medication 81 MILLIGRAM(S): at 08:17

## 2018-10-13 RX ADMIN — TAMSULOSIN HYDROCHLORIDE 0.4 MILLIGRAM(S): 0.4 CAPSULE ORAL at 20:37

## 2018-10-13 RX ADMIN — Medication 100 MILLIGRAM(S): at 20:37

## 2018-10-13 RX ADMIN — Medication 5 MILLIGRAM(S): at 20:37

## 2018-10-13 RX ADMIN — Medication 1 DROP(S): at 08:17

## 2018-10-13 RX ADMIN — Medication 1 DROP(S): at 08:18

## 2018-10-13 RX ADMIN — Medication 1 APPLICATION(S): at 20:38

## 2018-10-13 RX ADMIN — Medication 5 MILLIGRAM(S): at 20:36

## 2018-10-13 RX ADMIN — GABAPENTIN 600 MILLIGRAM(S): 400 CAPSULE ORAL at 08:18

## 2018-10-13 RX ADMIN — GABAPENTIN 600 MILLIGRAM(S): 400 CAPSULE ORAL at 20:36

## 2018-10-13 RX ADMIN — HALOPERIDOL DECANOATE 5 MILLIGRAM(S): 100 INJECTION INTRAMUSCULAR at 02:28

## 2018-10-13 RX ADMIN — HALOPERIDOL DECANOATE 5 MILLIGRAM(S): 100 INJECTION INTRAMUSCULAR at 20:37

## 2018-10-13 RX ADMIN — Medication 1 SPRAY(S): at 08:16

## 2018-10-13 RX ADMIN — Medication 1 PUFF(S): at 13:00

## 2018-10-13 RX ADMIN — ARIPIPRAZOLE 15 MILLIGRAM(S): 15 TABLET ORAL at 20:37

## 2018-10-13 RX ADMIN — ATORVASTATIN CALCIUM 10 MILLIGRAM(S): 80 TABLET, FILM COATED ORAL at 20:37

## 2018-10-13 RX ADMIN — ELVITEGRAVIR, COBICISTAT, EMTRICITABINE, AND TENOFOVIR ALAFENAMIDE 1 TABLET(S): 150; 150; 200; 10 TABLET ORAL at 08:18

## 2018-10-13 NOTE — PROGRESS NOTE BEHAVIORAL HEALTH - SUMMARY
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 Robles [facility]; remote hx of attempted self strangulation; PMH HIV, DVT, atrial fibrillation, DM2, HTN, HLD BIB EMS after he activated 911 [place] the [address] as he was afraid that family members are coming to kill him.      Upon assessment, patient is slightly less paranoid but is not responding to internal stimuli i.e. command hallucinations telling him to hurt himself.     Psychiatric Plan: Decrease abilify to 15mg QHS starting tomorrow 10/13 and increase haldol to 5mg BID, continue diazepam 5mg tid, Continue Depakote ER 1000mg qhs and 250mg Qdaily, trazodone 50mg prn QHS,     Plan is to continue pt's home regimen of medication:  Genvoya 150mg qd, HLD-lipitor 10mg hs, DM2 : metoformin 500mg bid, DVT prophylaxis-aspirin 81mg, plavix, oxybutinin 5mg BID, Tamulosin 0.4mg qd, gabapentin 600mg tid
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
## 2018-10-14 RX ORDER — HALOPERIDOL DECANOATE 100 MG/ML
10 INJECTION INTRAMUSCULAR ONCE
Qty: 0 | Refills: 0 | Status: DISCONTINUED | OUTPATIENT
Start: 2018-10-14 | End: 2018-10-19

## 2018-10-14 RX ORDER — DIPHENHYDRAMINE HCL 50 MG
50 CAPSULE ORAL ONCE
Qty: 0 | Refills: 0 | Status: COMPLETED | OUTPATIENT
Start: 2018-10-14 | End: 2018-10-25

## 2018-10-14 RX ADMIN — Medication 650 MILLIGRAM(S): at 22:37

## 2018-10-14 RX ADMIN — Medication 81 MILLIGRAM(S): at 08:19

## 2018-10-14 RX ADMIN — GABAPENTIN 600 MILLIGRAM(S): 400 CAPSULE ORAL at 08:19

## 2018-10-14 RX ADMIN — ARIPIPRAZOLE 15 MILLIGRAM(S): 15 TABLET ORAL at 20:19

## 2018-10-14 RX ADMIN — VALACYCLOVIR 1000 MILLIGRAM(S): 500 TABLET, FILM COATED ORAL at 08:22

## 2018-10-14 RX ADMIN — GABAPENTIN 600 MILLIGRAM(S): 400 CAPSULE ORAL at 20:20

## 2018-10-14 RX ADMIN — Medication 650 MILLIGRAM(S): at 23:40

## 2018-10-14 RX ADMIN — HALOPERIDOL DECANOATE 5 MILLIGRAM(S): 100 INJECTION INTRAMUSCULAR at 20:19

## 2018-10-14 RX ADMIN — DIVALPROEX SODIUM 1000 MILLIGRAM(S): 500 TABLET, DELAYED RELEASE ORAL at 20:20

## 2018-10-14 RX ADMIN — HALOPERIDOL DECANOATE 5 MILLIGRAM(S): 100 INJECTION INTRAMUSCULAR at 14:21

## 2018-10-14 RX ADMIN — Medication 1 PUFF(S): at 12:03

## 2018-10-14 RX ADMIN — Medication 5 MILLIGRAM(S): at 08:19

## 2018-10-14 RX ADMIN — ELVITEGRAVIR, COBICISTAT, EMTRICITABINE, AND TENOFOVIR ALAFENAMIDE 1 TABLET(S): 150; 150; 200; 10 TABLET ORAL at 08:22

## 2018-10-14 RX ADMIN — Medication 5 MILLIGRAM(S): at 20:22

## 2018-10-14 RX ADMIN — BUDESONIDE AND FORMOTEROL FUMARATE DIHYDRATE 2 PUFF(S): 160; 4.5 AEROSOL RESPIRATORY (INHALATION) at 08:21

## 2018-10-14 RX ADMIN — BUDESONIDE AND FORMOTEROL FUMARATE DIHYDRATE 2 PUFF(S): 160; 4.5 AEROSOL RESPIRATORY (INHALATION) at 20:21

## 2018-10-14 RX ADMIN — Medication 1 DROP(S): at 08:19

## 2018-10-14 RX ADMIN — DIVALPROEX SODIUM 250 MILLIGRAM(S): 500 TABLET, DELAYED RELEASE ORAL at 08:19

## 2018-10-14 RX ADMIN — Medication 120 MILLIGRAM(S): at 08:19

## 2018-10-14 RX ADMIN — Medication 5 MILLIGRAM(S): at 20:19

## 2018-10-14 RX ADMIN — TAMSULOSIN HYDROCHLORIDE 0.4 MILLIGRAM(S): 0.4 CAPSULE ORAL at 20:19

## 2018-10-14 RX ADMIN — CLOPIDOGREL BISULFATE 75 MILLIGRAM(S): 75 TABLET, FILM COATED ORAL at 08:19

## 2018-10-14 RX ADMIN — HALOPERIDOL DECANOATE 5 MILLIGRAM(S): 100 INJECTION INTRAMUSCULAR at 08:19

## 2018-10-14 RX ADMIN — METFORMIN HYDROCHLORIDE 500 MILLIGRAM(S): 850 TABLET ORAL at 08:19

## 2018-10-14 RX ADMIN — ATORVASTATIN CALCIUM 10 MILLIGRAM(S): 80 TABLET, FILM COATED ORAL at 20:19

## 2018-10-14 RX ADMIN — METFORMIN HYDROCHLORIDE 500 MILLIGRAM(S): 850 TABLET ORAL at 20:20

## 2018-10-14 RX ADMIN — Medication 1 PUFF(S): at 20:21

## 2018-10-14 RX ADMIN — Medication 100 MILLIGRAM(S): at 20:20

## 2018-10-14 RX ADMIN — PANTOPRAZOLE SODIUM 40 MILLIGRAM(S): 20 TABLET, DELAYED RELEASE ORAL at 08:19

## 2018-10-14 RX ADMIN — Medication 1 SPRAY(S): at 08:21

## 2018-10-14 RX ADMIN — GABAPENTIN 600 MILLIGRAM(S): 400 CAPSULE ORAL at 12:00

## 2018-10-14 RX ADMIN — Medication 1 PUFF(S): at 08:20

## 2018-10-14 RX ADMIN — Medication 100 MILLIGRAM(S): at 08:19

## 2018-10-14 RX ADMIN — Medication 5 MILLIGRAM(S): at 12:00

## 2018-10-14 RX ADMIN — MONTELUKAST 10 MILLIGRAM(S): 4 TABLET, CHEWABLE ORAL at 08:19

## 2018-10-14 RX ADMIN — Medication 1 APPLICATION(S): at 20:21

## 2018-10-14 NOTE — PROGRESS NOTE BEHAVIORAL HEALTH - NSBHFUPINTERVALHXFT_PSY_A_CORE
Pt seen, chart reviewed, discussed with staff No acute events overnight.  Pt is paranoid , delusional.  He took his medications this am. Behavior  calm but unpredictable. continue  2:1 for unpredictable behavior

## 2018-10-14 NOTE — PROGRESS NOTE BEHAVIORAL HEALTH - DETAILS
command hallucinations telling him to hurt himself

## 2018-10-14 NOTE — PROGRESS NOTE BEHAVIORAL HEALTH - SUMMARY
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 Robles [facility]; remote hx of attempted self strangulation; PMH HIV, DVT, atrial fibrillation, DM2, HTN, HLD BIB EMS after he activated [place] [address] as he was afraid that family members are coming to kill him.      Upon assessment, patient is slightly less paranoid but is not responding to internal stimuli i.e. command hallucinations telling him to hurt himself.     Psychiatric Plan: Decrease abilify to 15mg QHS starting tomorrow 10/13 and increase haldol to 5mg BID, continue diazepam 5mg tid, Continue Depakote ER 1000mg qhs and 250mg Qdaily, trazodone 50mg prn QHS,     Plan is to continue pt's home regimen of medication:  Genvoya 150mg qd, HLD-lipitor 10mg hs, DM2 : metoformin 500mg bid, DVT prophylaxis-aspirin 81mg, plavix, oxybutinin 5mg BID, Tamulosin 0.4mg qd, gabapentin 600mg tid

## 2018-10-15 RX ORDER — HALOPERIDOL DECANOATE 100 MG/ML
7.5 INJECTION INTRAMUSCULAR AT BEDTIME
Qty: 0 | Refills: 0 | Status: COMPLETED | OUTPATIENT
Start: 2018-10-15 | End: 2018-10-15

## 2018-10-15 RX ORDER — HALOPERIDOL DECANOATE 100 MG/ML
7.5 INJECTION INTRAMUSCULAR EVERY 12 HOURS
Qty: 0 | Refills: 0 | Status: DISCONTINUED | OUTPATIENT
Start: 2018-10-16 | End: 2018-10-19

## 2018-10-15 RX ADMIN — HALOPERIDOL DECANOATE 5 MILLIGRAM(S): 100 INJECTION INTRAMUSCULAR at 08:05

## 2018-10-15 RX ADMIN — PANTOPRAZOLE SODIUM 40 MILLIGRAM(S): 20 TABLET, DELAYED RELEASE ORAL at 08:05

## 2018-10-15 RX ADMIN — Medication 5 MILLIGRAM(S): at 08:05

## 2018-10-15 RX ADMIN — Medication 1 SPRAY(S): at 08:02

## 2018-10-15 RX ADMIN — Medication 1 PUFF(S): at 12:30

## 2018-10-15 RX ADMIN — GABAPENTIN 600 MILLIGRAM(S): 400 CAPSULE ORAL at 08:05

## 2018-10-15 RX ADMIN — METFORMIN HYDROCHLORIDE 500 MILLIGRAM(S): 850 TABLET ORAL at 20:47

## 2018-10-15 RX ADMIN — Medication 1 PUFF(S): at 08:03

## 2018-10-15 RX ADMIN — Medication 1 APPLICATION(S): at 20:49

## 2018-10-15 RX ADMIN — VALACYCLOVIR 1000 MILLIGRAM(S): 500 TABLET, FILM COATED ORAL at 08:04

## 2018-10-15 RX ADMIN — DIVALPROEX SODIUM 250 MILLIGRAM(S): 500 TABLET, DELAYED RELEASE ORAL at 08:05

## 2018-10-15 RX ADMIN — Medication 5 MILLIGRAM(S): at 12:27

## 2018-10-15 RX ADMIN — Medication 5 MILLIGRAM(S): at 20:47

## 2018-10-15 RX ADMIN — Medication 100 MILLIGRAM(S): at 08:05

## 2018-10-15 RX ADMIN — ARIPIPRAZOLE 15 MILLIGRAM(S): 15 TABLET ORAL at 20:47

## 2018-10-15 RX ADMIN — DIVALPROEX SODIUM 1000 MILLIGRAM(S): 500 TABLET, DELAYED RELEASE ORAL at 20:47

## 2018-10-15 RX ADMIN — METFORMIN HYDROCHLORIDE 500 MILLIGRAM(S): 850 TABLET ORAL at 08:05

## 2018-10-15 RX ADMIN — Medication 81 MILLIGRAM(S): at 08:05

## 2018-10-15 RX ADMIN — BUDESONIDE AND FORMOTEROL FUMARATE DIHYDRATE 2 PUFF(S): 160; 4.5 AEROSOL RESPIRATORY (INHALATION) at 08:03

## 2018-10-15 RX ADMIN — CLOPIDOGREL BISULFATE 75 MILLIGRAM(S): 75 TABLET, FILM COATED ORAL at 08:05

## 2018-10-15 RX ADMIN — ATORVASTATIN CALCIUM 10 MILLIGRAM(S): 80 TABLET, FILM COATED ORAL at 20:47

## 2018-10-15 RX ADMIN — MONTELUKAST 10 MILLIGRAM(S): 4 TABLET, CHEWABLE ORAL at 08:05

## 2018-10-15 RX ADMIN — Medication 1 DROP(S): at 07:38

## 2018-10-15 RX ADMIN — GABAPENTIN 600 MILLIGRAM(S): 400 CAPSULE ORAL at 20:47

## 2018-10-15 RX ADMIN — Medication 5 MILLIGRAM(S): at 20:48

## 2018-10-15 RX ADMIN — Medication 5 MILLIGRAM(S): at 08:09

## 2018-10-15 RX ADMIN — TAMSULOSIN HYDROCHLORIDE 0.4 MILLIGRAM(S): 0.4 CAPSULE ORAL at 20:45

## 2018-10-15 RX ADMIN — Medication 100 MILLIGRAM(S): at 20:47

## 2018-10-15 RX ADMIN — HALOPERIDOL DECANOATE 7.5 MILLIGRAM(S): 100 INJECTION INTRAMUSCULAR at 20:47

## 2018-10-15 RX ADMIN — Medication 650 MILLIGRAM(S): at 23:36

## 2018-10-15 RX ADMIN — GABAPENTIN 600 MILLIGRAM(S): 400 CAPSULE ORAL at 12:27

## 2018-10-15 RX ADMIN — ELVITEGRAVIR, COBICISTAT, EMTRICITABINE, AND TENOFOVIR ALAFENAMIDE 1 TABLET(S): 150; 150; 200; 10 TABLET ORAL at 08:04

## 2018-10-15 RX ADMIN — Medication 120 MILLIGRAM(S): at 08:05

## 2018-10-15 NOTE — PROGRESS NOTE BEHAVIORAL HEALTH - SUMMARY
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 Robles [facility]; remote hx of attempted self strangulation; PMH HIV, DVT, atrial fibrillation, DM2, HTN, HLD BIB EMS after he activated 911 [place] the [address] as he was afraid that family members are coming to kill him.      Upon assessment, patient remains paranoid but denies any command auditory hallucinations.    Psychiatric Plan: Continue abilify 15mg QHS, increase haldol to 7.5mg BID, continue diazepam 5mg tid, Continue Depakote ER 1000mg qhs and 250mg Qdaily, trazodone 50mg prn QHS,     Plan is to continue pt's home regimen of medication:  Genvoya 150mg qd, HLD-lipitor 10mg hs, DM2 : metoformin 500mg bid, DVT prophylaxis-aspirin 81mg, plavix, oxybutinin 5mg BID, Tamulosin 0.4mg qd, gabapentin 600mg tid

## 2018-10-15 NOTE — CHART NOTE - NSCHARTNOTEFT_GEN_A_CORE
Social Work Note:    Patient is now on a 2:1 for safety.  He was in seclusion on Friday.  Patient remains paranoid and suspicious.      Plan is for patient to return home and resume outpatient mental health treatment with his private psychiatrist.     At this time patient is not psychiatrically stable for discharge.

## 2018-10-15 NOTE — PROGRESS NOTE BEHAVIORAL HEALTH - PRN MEDS
haloperidol     Tablet   5 milliGRAM(s) Oral (09-28-18 @ 23:44)   5 milliGRAM(s) Oral (09-28-18 @ 16:42)    LORazepam     Tablet   2 milliGRAM(s) Oral (09-28-18 @ 23:44)
pt received haldol 5mg and ativan 2mg IM this morning at around 8am.
artificial  tears Solution   1 Drop(s) Both EYES (10-13-18 @ 08:17)    haloperidol     Tablet   5 milliGRAM(s) Oral (10-13-18 @ 02:28)   5 milliGRAM(s) Oral (10-12-18 @ 20:24)    hydrOXYzine hydrochloride   50 milliGRAM(s) Oral (10-13-18 @ 02:28)   50 milliGRAM(s) Oral (10-12-18 @ 20:24)    traZODone   50 milliGRAM(s) Oral (10-13-18 @ 02:12)
pt received haldol 5mg and ativan 2mg yesterday at 3pm in context of command hallucinations telling him to hurt himself and patient tried to strangle himself with bedsheet.
pt received haldol 5mg PO on 10/14 at 2:21pm.
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
## 2018-10-15 NOTE — PROGRESS NOTE BEHAVIORAL HEALTH - MODIFICATIONS
seen/discussed with resident. weekend events noted; pt remains paranoid with inappropriate affect.  Refuses to eat food if not from sealed package because he is a Sikh
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
## 2018-10-15 NOTE — PROGRESS NOTE BEHAVIORAL HEALTH - NSBHFUPINTERVALHXFT_PSY_A_CORE
Pt seen and examined. He reports "I'm fine now". He states "the day times are fine but at night it gets rough. I have to keep one eye open because I don't know who is going to try to kill me". He states that he slept well last night because "the PCAs were nice and the nurses were nice". He reports no other complaints at this time and states "I just want to be discharged". He denies hearing his mother's voice or any other voices that others cannot hear. He states he feels safe on the unit at this time. He denies any thoughts of wanting to hurt himself or others. Per nursing report, patient was banging his head against the wall. He is on 2:1. No other acute events reported.

## 2018-10-16 DIAGNOSIS — T14.8XXA OTHER INJURY OF UNSPECIFIED BODY REGION, INITIAL ENCOUNTER: ICD-10-CM

## 2018-10-16 DIAGNOSIS — B20 HUMAN IMMUNODEFICIENCY VIRUS [HIV] DISEASE: ICD-10-CM

## 2018-10-16 LAB
ANION GAP SERPL CALC-SCNC: 12 MMOL/L — SIGNIFICANT CHANGE UP (ref 7–14)
BUN SERPL-MCNC: 15 MG/DL — SIGNIFICANT CHANGE UP (ref 10–20)
CALCIUM SERPL-MCNC: 9.4 MG/DL — SIGNIFICANT CHANGE UP (ref 8.5–10.1)
CHLORIDE SERPL-SCNC: 103 MMOL/L — SIGNIFICANT CHANGE UP (ref 98–110)
CO2 SERPL-SCNC: 28 MMOL/L — SIGNIFICANT CHANGE UP (ref 17–32)
CREAT SERPL-MCNC: 0.8 MG/DL — SIGNIFICANT CHANGE UP (ref 0.7–1.5)
GLUCOSE SERPL-MCNC: 87 MG/DL — SIGNIFICANT CHANGE UP (ref 70–99)
POTASSIUM SERPL-MCNC: 4.2 MMOL/L — SIGNIFICANT CHANGE UP (ref 3.5–5)
POTASSIUM SERPL-SCNC: 4.2 MMOL/L — SIGNIFICANT CHANGE UP (ref 3.5–5)
SODIUM SERPL-SCNC: 143 MMOL/L — SIGNIFICANT CHANGE UP (ref 135–146)

## 2018-10-16 RX ORDER — DIAZEPAM 5 MG
5 TABLET ORAL THREE TIMES A DAY
Qty: 0 | Refills: 0 | Status: DISCONTINUED | OUTPATIENT
Start: 2018-10-16 | End: 2018-10-23

## 2018-10-16 RX ADMIN — ARIPIPRAZOLE 15 MILLIGRAM(S): 15 TABLET ORAL at 20:11

## 2018-10-16 RX ADMIN — Medication 5 MILLIGRAM(S): at 12:08

## 2018-10-16 RX ADMIN — METFORMIN HYDROCHLORIDE 500 MILLIGRAM(S): 850 TABLET ORAL at 20:12

## 2018-10-16 RX ADMIN — Medication 1 APPLICATION(S): at 08:33

## 2018-10-16 RX ADMIN — Medication 1 PUFF(S): at 20:15

## 2018-10-16 RX ADMIN — MONTELUKAST 10 MILLIGRAM(S): 4 TABLET, CHEWABLE ORAL at 08:12

## 2018-10-16 RX ADMIN — Medication 100 MILLIGRAM(S): at 08:17

## 2018-10-16 RX ADMIN — ELVITEGRAVIR, COBICISTAT, EMTRICITABINE, AND TENOFOVIR ALAFENAMIDE 1 TABLET(S): 150; 150; 200; 10 TABLET ORAL at 08:13

## 2018-10-16 RX ADMIN — Medication 5 MILLIGRAM(S): at 08:33

## 2018-10-16 RX ADMIN — GABAPENTIN 600 MILLIGRAM(S): 400 CAPSULE ORAL at 08:12

## 2018-10-16 RX ADMIN — GABAPENTIN 600 MILLIGRAM(S): 400 CAPSULE ORAL at 20:11

## 2018-10-16 RX ADMIN — BUDESONIDE AND FORMOTEROL FUMARATE DIHYDRATE 2 PUFF(S): 160; 4.5 AEROSOL RESPIRATORY (INHALATION) at 20:16

## 2018-10-16 RX ADMIN — Medication 650 MILLIGRAM(S): at 12:00

## 2018-10-16 RX ADMIN — HALOPERIDOL DECANOATE 7.5 MILLIGRAM(S): 100 INJECTION INTRAMUSCULAR at 20:12

## 2018-10-16 RX ADMIN — VALACYCLOVIR 1000 MILLIGRAM(S): 500 TABLET, FILM COATED ORAL at 08:13

## 2018-10-16 RX ADMIN — Medication 1 APPLICATION(S): at 22:06

## 2018-10-16 RX ADMIN — Medication 1 APPLICATION(S): at 08:14

## 2018-10-16 RX ADMIN — PANTOPRAZOLE SODIUM 40 MILLIGRAM(S): 20 TABLET, DELAYED RELEASE ORAL at 08:12

## 2018-10-16 RX ADMIN — Medication 5 MILLIGRAM(S): at 08:12

## 2018-10-16 RX ADMIN — DIVALPROEX SODIUM 1000 MILLIGRAM(S): 500 TABLET, DELAYED RELEASE ORAL at 20:11

## 2018-10-16 RX ADMIN — Medication 650 MILLIGRAM(S): at 11:00

## 2018-10-16 RX ADMIN — TAMSULOSIN HYDROCHLORIDE 0.4 MILLIGRAM(S): 0.4 CAPSULE ORAL at 20:12

## 2018-10-16 RX ADMIN — Medication 100 MILLIGRAM(S): at 20:11

## 2018-10-16 RX ADMIN — Medication 650 MILLIGRAM(S): at 00:45

## 2018-10-16 RX ADMIN — Medication 120 MILLIGRAM(S): at 07:00

## 2018-10-16 RX ADMIN — ATORVASTATIN CALCIUM 10 MILLIGRAM(S): 80 TABLET, FILM COATED ORAL at 20:11

## 2018-10-16 RX ADMIN — CLOPIDOGREL BISULFATE 75 MILLIGRAM(S): 75 TABLET, FILM COATED ORAL at 08:12

## 2018-10-16 RX ADMIN — GABAPENTIN 600 MILLIGRAM(S): 400 CAPSULE ORAL at 12:07

## 2018-10-16 RX ADMIN — HALOPERIDOL DECANOATE 7.5 MILLIGRAM(S): 100 INJECTION INTRAMUSCULAR at 08:12

## 2018-10-16 RX ADMIN — METFORMIN HYDROCHLORIDE 500 MILLIGRAM(S): 850 TABLET ORAL at 08:12

## 2018-10-16 RX ADMIN — Medication 5 MILLIGRAM(S): at 20:13

## 2018-10-16 RX ADMIN — Medication 81 MILLIGRAM(S): at 08:12

## 2018-10-16 RX ADMIN — DIVALPROEX SODIUM 250 MILLIGRAM(S): 500 TABLET, DELAYED RELEASE ORAL at 08:33

## 2018-10-16 RX ADMIN — Medication 5 MILLIGRAM(S): at 20:12

## 2018-10-16 NOTE — PROGRESS NOTE BEHAVIORAL HEALTH - MODIFICATIONS
seen/discussed with resident. Arm xray negative. pt remains fearful of food because of Christianity concerns which he refuses to elaborate on.  As noted above, pt expresses change of opinion regarding [facility] contact.  Pt currently expressing no concerns regarding care.

## 2018-10-16 NOTE — PROGRESS NOTE ADULT - PROBLEM SELECTOR PLAN 4
continue present treatment as per psych plan as reviewed  Medically stable with no new changes in treatment  will continue to monitor medical status while being treated on psych continue present treatment as per psych plan as reviewed  Medically stable with no new changes in treatment  will continue to monitor medical status while being treated on psych  previous note charted typed in error

## 2018-10-16 NOTE — CONSULT NOTE ADULT - PROBLEM SELECTOR RECOMMENDATION 9
stable disease  doubt cause of mental health issues - CD 4 over 500 & HIV VL less than 50  continue Genvoya upon dc  recall if needed
continue present treatment as per psych plan as reviewed  Medically stable with no new changes in treatment  will continue to monitor medical status while being treated on psych

## 2018-10-16 NOTE — CONSULT NOTE ADULT - SUBJECTIVE AND OBJECTIVE BOX
KISHAN PRO 57yMalePatient is a 57y old  Male who presents with a chief complaint of Schizophrenia (16 Oct 2018 08:40)      Patient has history of:  No Known Allergies    PMH - HIV dx  mood disorder    FSH - not relevant    ROS - not relevant      PHYSICAL EXAM  T(F): 96.7 (10-16-18 @ 06:39), Max: 96.7 (10-16-18 @ 06:39)  HR: 96 (10-16-18 @ 06:39) (63 - 96)  BP: 151/83 (10-16-18 @ 06:39) (113/67 - 151/83)  RR: 18 (10-16-18 @ 06:39) (16 - 18)  SpO2: --  Daily     Daily   HEENT: normal, no nuchal rigidity  Cor: RSR Nl S1 S2  Lungs: clear  Decreased breath sounds at bases  Abdomen: Nontender, Nl BS,   Ext: No phlebitis     LAB & RADIOLOGIC RESULTS:      Progress Note Adult-Internal Medicine Attending [TEZ Candelario] (10-16-18 @ 08:40)  Chart Note-Event Note Attending [DMITRIY Judge] (10-15-18 @ 23:28)  Progress Note Behavioral Health [HANK Gonzáles] (10-15-18 @ 14:45)  Chart Note-Event Note SW [DMITRIY Bright] (10-15-18 @ 08:38)  Progress Note Behavioral Health [POLI Alfaro] (10-14-18 @ 17:09)  Progress Note Behavioral Health [DONTRELL Tripp] (10-13-18 @ 17:31)  Chart Note-Event Note R/O Head  PA [DEYSI Linares] (10-13-18 @ 17:17)  Progress Note Behavioral Health [HANK Gonzáles] (10-12-18 @ 13:12)  Progress Note Adult-Internal Medicine Attending [TEZ Candelario] (10-12-18 @ 08:27)  Progress Note Adult-Psychiatry Attending [TONY Griffiths] (10-11-18 @ 21:59)  Change in Status Notification [DELMAR Rosales] (10-11-18 @ 16:05)  Progress Note Adult-Psychiatry Attending [DMITRIY Newby] (10-11-18 @ 15:52)  Progress Note Behavioral Health [HANK Gonzáles] (10-11-18 @ 13:24)  Chart Note-Event Note SW [DMITRIY Bright] (10-11-18 @ 13:04)  Progress Note Behavioral Health [DMITRIY Newby] (10-10-18 @ 14:39)  Progress Note Adult-Podiatry Resident [ISMAEL Tyson] (10-10-18 @ 07:13)  Progress Note Behavioral Health [DMITRIY HANK Newby] (10-09-18 @ 10:57)  Progress Note Adult-Internal Medicine Attending [TEZ Candelario] (10-09-18 @ 08:45)  Chart Note-Event Note SW [DMITRIY Bright] (10-09-18 @ 07:58)  Progress Note Behavioral Health [DMITRIY HANK Newby] (10-08-18 @ 13:39)  Progress Note Behavioral Health [SAMI Chew] (10-07-18 @ 10:41)  Progress Note Behavioral Health [DMITRIY Newby] (10-05-18 @ 11:04)  Progress Note Behavioral Health [COMPAHANK Kramer] (10-04-18 @ 10:54)  Progress Note Adult-Podiatry Resident/Attending [DEYSI Abel] (10-04-18 @ 08:57)  Progress Note Behavioral Health [DMITRIY Newby] (10-03-18 @ 10:46)  Progress Note Adult-Internal Medicine Attending [TEZ Candelario] (10-03-18 @ 08:25)  Progress Note Behavioral Health [COMPAHANK Kramer] (10-02-18 @ 10:15)  Provider Contact Note (Other) [DONTRELL Modi] (10-02-18 @ 10:10)  Provider Contact Note (Other) [DONTRELL Modi] (10-02-18 @ 10:07)  Chart Note-Event Note Attending [SAIM Fuentes] (10-02-18 @ 09:56)  Progress Note Behavioral Health [COMPAHANK Kramer] (10-01-18 @ 10:10)  Progress Note Adult-Internal Medicine Attending [TEZ Candelario] (10-01-18 @ 08:21)  Chart Note-Event Note PA [DEYSI Gaimno] (09-30-18 @ 15:48)  Provider Contact Note (Medication) [S. Lorraine] (09-30-18 @ 01:26)  Progress Note Behavioral Health [DONTRELL Tripp] (09-29-18 @ 11:02)  Progress Note Behavioral Health [COMPAHANK Kramer] (09-28-18 @ 10:53)  Progress Note Adult-Podiatry Resident [ISMAEL Woo] (09-28-18 @ 08:34)  Progress Note Adult-Internal Medicine Attending [TEZ Candelario] (09-28-18 @ 08:32)  Chart Note-Event Note SW [DMITRIY Bright] (09-27-18 @ 14:12)  Consult Note Adult-Podiatry Resident/Attending [ISMAEL Woo] (09-27-18 @ 09:11)  ED Post Discharge Note [POLI Hernández] (09-26-18 @ 11:04)  Progress Note Behavioral Health [DMITRIY Newby] (09-26-18 @ 10:05)  Progress Note Adult-Internal Medicine Attending [TEZ Candelario] (09-26-18 @ 08:16)  Progress Note Behavioral Health [COMPAHANK Kramer] (09-25-18 @ 11:23)  Provider Contact Note (Medication) [DONTRELL Modi] (09-24-18 @ 11:53)  Chart Note-Event Note SW [DMITRIY Bright] (09-24-18 @ 09:40)  Consult Note Adult-Internal Medicine Attending [TEZ Candelario] (09-24-18 @ 08:17)  Patient Profile Behavioral Health [SKeke Huber] (09-24-18 @ 04:00)  ED Provider Note [DONTRELL Granger] (09-23-18 @ 22:26)  ED Behavioral Health Assessment Note [DMITRIY Lakhani] (09-23-18 @ 21:48)  ED ADULT Nurse Note [DEYSI Joseph] (09-23-18 @ 20:49)  ED ADULT Triage Note [DEYSI Macias] (09-23-18 @ 20:33)  ED ADULT Nurse Reassessment Note [DEYSI Ramesh] (09-05-18 @ 06:48)  ED ADULT Nurse Reassessment Note [DEYSI Ramesh] (09-05-18 @ 04:42)  ED ADULT Nurse Reassessment Note [BENITA Caro] (09-05-18 @ 04:21)  ED ADULT Nurse Note [BENITA Guadalupe] (09-05-18 @ 03:22)  ED ADULT Triage Note [DMITRIY Parmar] (09-05-18 @ 02:18)  ED Provider Note [DONTRELL Alvarez] (07-14-16 @ 04:23)  ED ADULT Nurse Note [DMITRIY Burgos V. Edwards] (07-14-16 @ 02:51)  ED ADULT Triage Note [EILEEN Lang] (07-14-16 @ 02:45)  Discharge Plan Adult [DMITRIY Chew] (03-31-14 @ 16:18)  Discharge Summary Adult [DMITRIY Porter] (03-31-14 @ 16:13)  Dietitian Initial Evaluation Adult [DMITRIY Rubio] (03-31-14 @ 14:16)  Patient Profile Adult [ASHISH Feliciano] (03-29-14 @ 15:51)  H&P Adult [DMITRIY Porter] (03-29-14 @ 08:48)  Emergency Department Discharge Instructions - FRK [RELL. SaintJerobe] (03-29-14 @ 07:48)  ED RN Note [DEYSI Donohue] (03-29-14 @ 03:46)  Emergency Department Discharge Instructions - FRK [NAIF Reece] (10-30-13 @ 06:19)  ED RN Note [SAMI Rodriguez] (10-30-13 @ 03:59)

## 2018-10-16 NOTE — PROGRESS NOTE ADULT - PROBLEM SELECTOR PLAN 5
local care to skin  no further tx at present time  will monitor fro healign and for any further bruising isseus

## 2018-10-16 NOTE — PROGRESS NOTE BEHAVIORAL HEALTH - NSBHFUPINTERVALHXFT_PSY_A_CORE
Pt seen and examined. He reports "I'm doing ok". He states "[facility] to report the bruises I have on my legs and arms". When explained that he was restrained due to his trying to choke himself and trying to bang his head against the wall, he states "Oh yeah that's true". He reports that "I probably shouldn't have called them. I don't know what to do. I just want to go home". Pt states "I feel a little better with the medication. I slept well. I'm not hearing any voices". He denies any other acute complaints at this time. Per nursing, [facility] team came at around 2am to investigate patient's complaints. No other acute events reported.

## 2018-10-16 NOTE — PROGRESS NOTE ADULT - ATTENDING COMMENTS
On removal of dried eschar of the hallux medial boarder, noted some serous drainage. There was a void where the partial nail avulsion was performed. + tenderness of the proximal medial nail fold. pain maybe secondary to the matrixectomy procedure or persistent infection. Continue p.o antibiotics. Will continue to monitor patient.
pt examined with 1:1 sit presetn

## 2018-10-16 NOTE — PROGRESS NOTE BEHAVIORAL HEALTH - SUMMARY
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 Robles [facility]; remote hx of attempted self strangulation; PMH HIV, DVT, atrial fibrillation, DM2, HTN, HLD BIB EMS after he activated [place] [address] as he was afraid that family members are coming to kill him.      Upon assessment, patient remains paranoid but denies any command auditory hallucinations.    Psychiatric Plan: Continue abilify 15mg QHS, continue haldol to 7.5mg BID, continue diazepam 5mg tid, Continue Depakote ER 1000mg qhs and 250mg Qdaily, trazodone 50mg prn QHS,     Plan is to continue pt's home regimen of medication:  Genvoya 150mg qd, HLD-lipitor 10mg hs, DM2 : metoformin 500mg bid, DVT prophylaxis-aspirin 81mg, plavix, oxybutinin 5mg BID, Tamulosin 0.4mg qd, gabapentin 600mg tid

## 2018-10-17 DIAGNOSIS — F25.0 SCHIZOAFFECTIVE DISORDER, BIPOLAR TYPE: ICD-10-CM

## 2018-10-17 LAB
HCT VFR BLD CALC: 35.8 % — LOW (ref 42–52)
HGB BLD-MCNC: 11.8 G/DL — LOW (ref 14–18)
MCHC RBC-ENTMCNC: 29.3 PG — SIGNIFICANT CHANGE UP (ref 27–31)
MCHC RBC-ENTMCNC: 33 G/DL — SIGNIFICANT CHANGE UP (ref 32–37)
MCV RBC AUTO: 88.8 FL — SIGNIFICANT CHANGE UP (ref 80–94)
NRBC # BLD: 0 /100 WBCS — SIGNIFICANT CHANGE UP (ref 0–0)
PLATELET # BLD AUTO: 128 K/UL — LOW (ref 130–400)
RBC # BLD: 4.03 M/UL — LOW (ref 4.7–6.1)
RBC # FLD: 15.1 % — HIGH (ref 11.5–14.5)
WBC # BLD: 3.88 K/UL — LOW (ref 4.8–10.8)
WBC # FLD AUTO: 3.88 K/UL — LOW (ref 4.8–10.8)

## 2018-10-17 RX ORDER — IBUPROFEN 200 MG
400 TABLET ORAL ONCE
Qty: 0 | Refills: 0 | Status: COMPLETED | OUTPATIENT
Start: 2018-10-17 | End: 2018-10-17

## 2018-10-17 RX ADMIN — Medication 650 MILLIGRAM(S): at 22:03

## 2018-10-17 RX ADMIN — Medication 400 MILLIGRAM(S): at 07:53

## 2018-10-17 RX ADMIN — Medication 81 MILLIGRAM(S): at 08:07

## 2018-10-17 RX ADMIN — PANTOPRAZOLE SODIUM 40 MILLIGRAM(S): 20 TABLET, DELAYED RELEASE ORAL at 08:07

## 2018-10-17 RX ADMIN — Medication 120 MILLIGRAM(S): at 08:07

## 2018-10-17 RX ADMIN — METFORMIN HYDROCHLORIDE 500 MILLIGRAM(S): 850 TABLET ORAL at 20:54

## 2018-10-17 RX ADMIN — Medication 400 MILLIGRAM(S): at 06:50

## 2018-10-17 RX ADMIN — Medication 1 APPLICATION(S): at 20:54

## 2018-10-17 RX ADMIN — Medication 5 MILLIGRAM(S): at 08:07

## 2018-10-17 RX ADMIN — HALOPERIDOL DECANOATE 7.5 MILLIGRAM(S): 100 INJECTION INTRAMUSCULAR at 20:53

## 2018-10-17 RX ADMIN — Medication 1 PUFF(S): at 20:52

## 2018-10-17 RX ADMIN — ELVITEGRAVIR, COBICISTAT, EMTRICITABINE, AND TENOFOVIR ALAFENAMIDE 1 TABLET(S): 150; 150; 200; 10 TABLET ORAL at 08:10

## 2018-10-17 RX ADMIN — Medication 5 MILLIGRAM(S): at 20:51

## 2018-10-17 RX ADMIN — GABAPENTIN 600 MILLIGRAM(S): 400 CAPSULE ORAL at 12:14

## 2018-10-17 RX ADMIN — BUDESONIDE AND FORMOTEROL FUMARATE DIHYDRATE 2 PUFF(S): 160; 4.5 AEROSOL RESPIRATORY (INHALATION) at 20:52

## 2018-10-17 RX ADMIN — DIVALPROEX SODIUM 250 MILLIGRAM(S): 500 TABLET, DELAYED RELEASE ORAL at 08:07

## 2018-10-17 RX ADMIN — MONTELUKAST 10 MILLIGRAM(S): 4 TABLET, CHEWABLE ORAL at 08:07

## 2018-10-17 RX ADMIN — TAMSULOSIN HYDROCHLORIDE 0.4 MILLIGRAM(S): 0.4 CAPSULE ORAL at 20:50

## 2018-10-17 RX ADMIN — Medication 100 MILLIGRAM(S): at 20:50

## 2018-10-17 RX ADMIN — Medication 650 MILLIGRAM(S): at 02:05

## 2018-10-17 RX ADMIN — VALACYCLOVIR 1000 MILLIGRAM(S): 500 TABLET, FILM COATED ORAL at 08:09

## 2018-10-17 RX ADMIN — ATORVASTATIN CALCIUM 10 MILLIGRAM(S): 80 TABLET, FILM COATED ORAL at 21:05

## 2018-10-17 RX ADMIN — Medication 650 MILLIGRAM(S): at 02:07

## 2018-10-17 RX ADMIN — HALOPERIDOL DECANOATE 7.5 MILLIGRAM(S): 100 INJECTION INTRAMUSCULAR at 08:08

## 2018-10-17 RX ADMIN — Medication 5 MILLIGRAM(S): at 12:14

## 2018-10-17 RX ADMIN — Medication 5 MILLIGRAM(S): at 20:56

## 2018-10-17 RX ADMIN — GABAPENTIN 600 MILLIGRAM(S): 400 CAPSULE ORAL at 20:50

## 2018-10-17 RX ADMIN — Medication 5 MILLIGRAM(S): at 08:09

## 2018-10-17 RX ADMIN — METFORMIN HYDROCHLORIDE 500 MILLIGRAM(S): 850 TABLET ORAL at 08:07

## 2018-10-17 RX ADMIN — CLOPIDOGREL BISULFATE 75 MILLIGRAM(S): 75 TABLET, FILM COATED ORAL at 08:07

## 2018-10-17 RX ADMIN — GABAPENTIN 600 MILLIGRAM(S): 400 CAPSULE ORAL at 08:07

## 2018-10-17 RX ADMIN — Medication 100 MILLIGRAM(S): at 08:07

## 2018-10-17 RX ADMIN — ARIPIPRAZOLE 15 MILLIGRAM(S): 15 TABLET ORAL at 20:49

## 2018-10-17 RX ADMIN — DIVALPROEX SODIUM 1000 MILLIGRAM(S): 500 TABLET, DELAYED RELEASE ORAL at 20:51

## 2018-10-17 NOTE — CHART NOTE - NSCHARTNOTEFT_GEN_A_CORE
Social Work Note:    Patient remains disorganized.  He is on a 2:1 for safety.  During the day he is observed to be visible on the unit.  This worker spoke with his aunt Mila Flores on Monday.      Plan is for patient to resume outpatient mental health treatment with his private psychiatrist Dr. Robles.     At this time patient is not psychiatrically stable for discharge.

## 2018-10-18 RX ORDER — ACETAMINOPHEN 500 MG
650 TABLET ORAL EVERY 6 HOURS
Qty: 0 | Refills: 0 | Status: DISCONTINUED | OUTPATIENT
Start: 2018-10-18 | End: 2018-10-29

## 2018-10-18 RX ADMIN — GABAPENTIN 600 MILLIGRAM(S): 400 CAPSULE ORAL at 12:16

## 2018-10-18 RX ADMIN — Medication 1 SPRAY(S): at 08:51

## 2018-10-18 RX ADMIN — MONTELUKAST 10 MILLIGRAM(S): 4 TABLET, CHEWABLE ORAL at 08:47

## 2018-10-18 RX ADMIN — Medication 650 MILLIGRAM(S): at 11:43

## 2018-10-18 RX ADMIN — Medication 1 PUFF(S): at 14:43

## 2018-10-18 RX ADMIN — Medication 5 MILLIGRAM(S): at 12:16

## 2018-10-18 RX ADMIN — VALACYCLOVIR 1000 MILLIGRAM(S): 500 TABLET, FILM COATED ORAL at 08:55

## 2018-10-18 RX ADMIN — Medication 1 APPLICATION(S): at 08:57

## 2018-10-18 RX ADMIN — Medication 1 DROP(S): at 09:06

## 2018-10-18 RX ADMIN — Medication 5 MILLIGRAM(S): at 08:56

## 2018-10-18 RX ADMIN — Medication 5 MILLIGRAM(S): at 20:04

## 2018-10-18 RX ADMIN — ELVITEGRAVIR, COBICISTAT, EMTRICITABINE, AND TENOFOVIR ALAFENAMIDE 1 TABLET(S): 150; 150; 200; 10 TABLET ORAL at 08:55

## 2018-10-18 RX ADMIN — Medication 1 PUFF(S): at 20:05

## 2018-10-18 RX ADMIN — DIVALPROEX SODIUM 1000 MILLIGRAM(S): 500 TABLET, DELAYED RELEASE ORAL at 20:01

## 2018-10-18 RX ADMIN — Medication 650 MILLIGRAM(S): at 02:05

## 2018-10-18 RX ADMIN — BUDESONIDE AND FORMOTEROL FUMARATE DIHYDRATE 2 PUFF(S): 160; 4.5 AEROSOL RESPIRATORY (INHALATION) at 20:03

## 2018-10-18 RX ADMIN — METFORMIN HYDROCHLORIDE 500 MILLIGRAM(S): 850 TABLET ORAL at 20:03

## 2018-10-18 RX ADMIN — ARIPIPRAZOLE 15 MILLIGRAM(S): 15 TABLET ORAL at 20:00

## 2018-10-18 RX ADMIN — BUDESONIDE AND FORMOTEROL FUMARATE DIHYDRATE 2 PUFF(S): 160; 4.5 AEROSOL RESPIRATORY (INHALATION) at 08:51

## 2018-10-18 RX ADMIN — CLOPIDOGREL BISULFATE 75 MILLIGRAM(S): 75 TABLET, FILM COATED ORAL at 08:48

## 2018-10-18 RX ADMIN — Medication 1 DROP(S): at 08:52

## 2018-10-18 RX ADMIN — Medication 650 MILLIGRAM(S): at 03:00

## 2018-10-18 RX ADMIN — Medication 650 MILLIGRAM(S): at 11:50

## 2018-10-18 RX ADMIN — PANTOPRAZOLE SODIUM 40 MILLIGRAM(S): 20 TABLET, DELAYED RELEASE ORAL at 07:23

## 2018-10-18 RX ADMIN — Medication 100 MILLIGRAM(S): at 08:48

## 2018-10-18 RX ADMIN — Medication 81 MILLIGRAM(S): at 08:48

## 2018-10-18 RX ADMIN — HALOPERIDOL DECANOATE 7.5 MILLIGRAM(S): 100 INJECTION INTRAMUSCULAR at 20:02

## 2018-10-18 RX ADMIN — HALOPERIDOL DECANOATE 7.5 MILLIGRAM(S): 100 INJECTION INTRAMUSCULAR at 08:47

## 2018-10-18 RX ADMIN — Medication 1 APPLICATION(S): at 20:04

## 2018-10-18 RX ADMIN — Medication 5 MILLIGRAM(S): at 10:24

## 2018-10-18 RX ADMIN — GABAPENTIN 600 MILLIGRAM(S): 400 CAPSULE ORAL at 20:01

## 2018-10-18 RX ADMIN — Medication 1 PUFF(S): at 08:52

## 2018-10-18 RX ADMIN — Medication 120 MILLIGRAM(S): at 08:48

## 2018-10-18 RX ADMIN — GABAPENTIN 600 MILLIGRAM(S): 400 CAPSULE ORAL at 08:47

## 2018-10-18 RX ADMIN — DIVALPROEX SODIUM 250 MILLIGRAM(S): 500 TABLET, DELAYED RELEASE ORAL at 08:49

## 2018-10-18 RX ADMIN — ATORVASTATIN CALCIUM 10 MILLIGRAM(S): 80 TABLET, FILM COATED ORAL at 20:00

## 2018-10-18 RX ADMIN — METFORMIN HYDROCHLORIDE 500 MILLIGRAM(S): 850 TABLET ORAL at 08:47

## 2018-10-18 RX ADMIN — TAMSULOSIN HYDROCHLORIDE 0.4 MILLIGRAM(S): 0.4 CAPSULE ORAL at 20:03

## 2018-10-18 RX ADMIN — Medication 5 MILLIGRAM(S): at 20:06

## 2018-10-18 NOTE — CHART NOTE - NSCHARTNOTEFT_GEN_A_CORE
Patient slipped from chair and  fell to floor and hit the left side of his head. There was no loc, no headache, N/V. on exam there is a small hematoma to lateral scalp. recommend cold compress and monitor clinically.

## 2018-10-18 NOTE — PROGRESS NOTE BEHAVIORAL HEALTH - SUMMARY
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 Robles [facility]; remote hx of attempted self strangulation; PMH HIV, DVT, atrial fibrillation, DM2, HTN, HLD BIB EMS after he activated [place] [employer] [address] as he was afraid that family members are coming to kill him.      Upon assessment, patient appears more organized and less paranoid.    Psychiatric Plan: Continue abilify 15mg QHS, continue haldol to 7.5mg BID, continue diazepam 5mg tid, Continue Depakote ER 1000mg qhs and 250mg Qdaily, trazodone 50mg prn QHS,     Plan is to continue pt's home regimen of medication:  Genvoya 150mg qd, HLD-lipitor 10mg hs, DM2 : metoformin 500mg bid, DVT prophylaxis-aspirin 81mg, plavix, oxybutinin 5mg BID, Tamulosin 0.4mg qd, gabapentin 600mg tid

## 2018-10-18 NOTE — PROGRESS NOTE BEHAVIORAL HEALTH - NSBHFUPINTERVALHXFT_PSY_A_CORE
Pt seen and examined. He reports "I'm doing great". He states "I slept great. I feel great". He denies feeling paranoid and states "I feel safe now". He states to writer "I just want to thank you for everything you do for me everyday. You really helped me". He denies any side effects to medications. He reports no other acute complaints at [facility].. No other acute events reported by nursing or staff.

## 2018-10-19 RX ORDER — HALOPERIDOL DECANOATE 100 MG/ML
10 INJECTION INTRAMUSCULAR AT BEDTIME
Qty: 0 | Refills: 0 | Status: DISCONTINUED | OUTPATIENT
Start: 2018-10-19 | End: 2018-10-22

## 2018-10-19 RX ORDER — HALOPERIDOL DECANOATE 100 MG/ML
7.5 INJECTION INTRAMUSCULAR DAILY
Qty: 0 | Refills: 0 | Status: DISCONTINUED | OUTPATIENT
Start: 2018-10-20 | End: 2018-10-22

## 2018-10-19 RX ADMIN — CLOPIDOGREL BISULFATE 75 MILLIGRAM(S): 75 TABLET, FILM COATED ORAL at 08:04

## 2018-10-19 RX ADMIN — METFORMIN HYDROCHLORIDE 500 MILLIGRAM(S): 850 TABLET ORAL at 08:05

## 2018-10-19 RX ADMIN — Medication 1 SPRAY(S): at 08:08

## 2018-10-19 RX ADMIN — BUDESONIDE AND FORMOTEROL FUMARATE DIHYDRATE 2 PUFF(S): 160; 4.5 AEROSOL RESPIRATORY (INHALATION) at 08:06

## 2018-10-19 RX ADMIN — HALOPERIDOL DECANOATE 7.5 MILLIGRAM(S): 100 INJECTION INTRAMUSCULAR at 08:05

## 2018-10-19 RX ADMIN — Medication 5 MILLIGRAM(S): at 20:20

## 2018-10-19 RX ADMIN — MONTELUKAST 10 MILLIGRAM(S): 4 TABLET, CHEWABLE ORAL at 08:06

## 2018-10-19 RX ADMIN — Medication 1 DROP(S): at 08:26

## 2018-10-19 RX ADMIN — Medication 1 APPLICATION(S): at 08:06

## 2018-10-19 RX ADMIN — Medication 1 APPLICATION(S): at 20:24

## 2018-10-19 RX ADMIN — ARIPIPRAZOLE 15 MILLIGRAM(S): 15 TABLET ORAL at 20:20

## 2018-10-19 RX ADMIN — ATORVASTATIN CALCIUM 10 MILLIGRAM(S): 80 TABLET, FILM COATED ORAL at 20:20

## 2018-10-19 RX ADMIN — Medication 5 MILLIGRAM(S): at 08:06

## 2018-10-19 RX ADMIN — GABAPENTIN 600 MILLIGRAM(S): 400 CAPSULE ORAL at 08:04

## 2018-10-19 RX ADMIN — GABAPENTIN 600 MILLIGRAM(S): 400 CAPSULE ORAL at 12:38

## 2018-10-19 RX ADMIN — Medication 1 PUFF(S): at 08:07

## 2018-10-19 RX ADMIN — DIVALPROEX SODIUM 250 MILLIGRAM(S): 500 TABLET, DELAYED RELEASE ORAL at 08:04

## 2018-10-19 RX ADMIN — Medication 1 DROP(S): at 08:06

## 2018-10-19 RX ADMIN — METFORMIN HYDROCHLORIDE 500 MILLIGRAM(S): 850 TABLET ORAL at 20:21

## 2018-10-19 RX ADMIN — ELVITEGRAVIR, COBICISTAT, EMTRICITABINE, AND TENOFOVIR ALAFENAMIDE 1 TABLET(S): 150; 150; 200; 10 TABLET ORAL at 08:26

## 2018-10-19 RX ADMIN — BUDESONIDE AND FORMOTEROL FUMARATE DIHYDRATE 2 PUFF(S): 160; 4.5 AEROSOL RESPIRATORY (INHALATION) at 20:22

## 2018-10-19 RX ADMIN — GABAPENTIN 600 MILLIGRAM(S): 400 CAPSULE ORAL at 20:25

## 2018-10-19 RX ADMIN — VALACYCLOVIR 1000 MILLIGRAM(S): 500 TABLET, FILM COATED ORAL at 08:26

## 2018-10-19 RX ADMIN — HALOPERIDOL DECANOATE 10 MILLIGRAM(S): 100 INJECTION INTRAMUSCULAR at 20:21

## 2018-10-19 RX ADMIN — DIVALPROEX SODIUM 1000 MILLIGRAM(S): 500 TABLET, DELAYED RELEASE ORAL at 20:20

## 2018-10-19 RX ADMIN — TAMSULOSIN HYDROCHLORIDE 0.4 MILLIGRAM(S): 0.4 CAPSULE ORAL at 20:21

## 2018-10-19 RX ADMIN — Medication 5 MILLIGRAM(S): at 20:21

## 2018-10-19 RX ADMIN — Medication 1 PUFF(S): at 20:23

## 2018-10-19 RX ADMIN — Medication 81 MILLIGRAM(S): at 08:04

## 2018-10-19 RX ADMIN — PANTOPRAZOLE SODIUM 40 MILLIGRAM(S): 20 TABLET, DELAYED RELEASE ORAL at 07:22

## 2018-10-19 RX ADMIN — Medication 100 MILLIGRAM(S): at 08:04

## 2018-10-19 RX ADMIN — Medication 5 MILLIGRAM(S): at 12:38

## 2018-10-19 RX ADMIN — Medication 120 MILLIGRAM(S): at 08:04

## 2018-10-19 NOTE — PROGRESS NOTE BEHAVIORAL HEALTH - NSBHFUPINTERVALHXFT_PSY_A_CORE
Pt seen and examined. He states "I was not trying to punch myself last night!". He reports that his beard was irritating him and he "lightly slapped my left face because it was itchy". He states he was then "pretending to box but not hitting the wall but just punching in the air". He denies wanting to hurt himself. He denies feeling paranoid. He denies hearing voices others cannot hear. He denies any other acute complaints at this time. Per nursing, patient "tried to punch himself in the face last night". Not other acute events reported.

## 2018-10-19 NOTE — PROVIDER CONTACT NOTE (OTHER) - SITUATION
Patient walked up the hallway from his room toward 2N nurses station and made attempt to punch the wall and when staff verbally intervened, patient punched himself in the face about three times.

## 2018-10-19 NOTE — PROGRESS NOTE BEHAVIORAL HEALTH - SUMMARY
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 Robles [facility]; remote hx of attempted self strangulation; PMH HIV, DVT, atrial fibrillation, DM2, HTN, HLD BIB EMS after he activated 911 [place] [address] as he was afraid that family members are coming to kill him.      Upon assessment, patient appears more organized and less paranoid.    Psychiatric Plan: Continue abilify 15mg QHS, increase haldol to 7.5mgQdaily and 10mg QHS, continue diazepam 5mg tid, Continue Depakote ER 1000mg qhs and 250mg Qdaily, trazodone 50mg prn QHS,     Plan is to continue pt's home regimen of medication:  Genvoya 150mg qd, HLD-lipitor 10mg hs, DM2 : metoformin 500mg bid, DVT prophylaxis-aspirin 81mg, plavix, oxybutinin 5mg BID, Tamulosin 0.4mg qd, gabapentin 600mg tid

## 2018-10-19 NOTE — PROVIDER CONTACT NOTE (OTHER) - ACTION/TREATMENT ORDERED:
Discussed alternative effective coping skills; PMCS techniques of modelling; listening and understanding; and exploration of consequences (of self-harm) utilized; offered prn med which pt refused;

## 2018-10-19 NOTE — PROVIDER CONTACT NOTE (OTHER) - ASSESSMENT
no injuries observed, VSS
Assessed for pian in facial area. Patient denies. On discussion, patient says he was angry and that's why he wanted to punch the wall. Also says "If I punch myself, there would be bruises and there will be an investigation"
received report from stuff that pt punched himself in the face. Assessed for pain, pt denied pain in the face area, denies punching himself. Later on approached the writer c/o of left hip pain. Tylenol ordered, pt refused and denied the pain right away.

## 2018-10-19 NOTE — PROGRESS NOTE BEHAVIORAL HEALTH - MODIFICATIONS
seen/discussed with resident. No s/h ideation. Pt insists he didn't punch self/walls despite reports. ?swelling on left side of face ; pt is noted to have slipped out of chair yesterday, and he denies facial injury/co.No s/h ideation; no overt psyxhosis seen/discussed with resident. No s/h ideation. Pt insists he didn't punch self/walls despite reports ?swelling on left side of face ; has slight bump of left occiput; non tender. Skin intact. pt is noted to have slipped out of chair yesterday, and he denies facial injury/co.No s/h ideation; no overt psyxhosis

## 2018-10-19 NOTE — PROVIDER CONTACT NOTE (OTHER) - RECOMMENDATIONS
assessment and medication
Assess pt
Psychiatrist come assess patient. Medical to come assess patient too. Possible prn to help with paranoia and persecutory delusions.

## 2018-10-19 NOTE — CHART NOTE - NSCHARTNOTEFT_GEN_A_CORE
Social Work Note:    This worker met with patient to discuss discharge plan.  At that time patient was able to engage appropriately.  Plan of referral for continued mental health treatment discussed.  Patient wishes to return to his psychiatrist Dr. Robles.  Ivan maintains communication with his aunt Mila Jamison.  This worker spoke with patient's aunt earlier this week to discuss patient's progress.      At this time patient is not psychiatrically stable for discharge.

## 2018-10-20 RX ADMIN — Medication 5 MILLIGRAM(S): at 21:09

## 2018-10-20 RX ADMIN — Medication 5 MILLIGRAM(S): at 12:41

## 2018-10-20 RX ADMIN — Medication 100 MILLIGRAM(S): at 20:13

## 2018-10-20 RX ADMIN — HALOPERIDOL DECANOATE 10 MILLIGRAM(S): 100 INJECTION INTRAMUSCULAR at 20:13

## 2018-10-20 RX ADMIN — Medication 1 SPRAY(S): at 08:06

## 2018-10-20 RX ADMIN — ARIPIPRAZOLE 15 MILLIGRAM(S): 15 TABLET ORAL at 20:13

## 2018-10-20 RX ADMIN — Medication 81 MILLIGRAM(S): at 08:07

## 2018-10-20 RX ADMIN — DIVALPROEX SODIUM 1000 MILLIGRAM(S): 500 TABLET, DELAYED RELEASE ORAL at 20:13

## 2018-10-20 RX ADMIN — Medication 1 APPLICATION(S): at 08:12

## 2018-10-20 RX ADMIN — Medication 100 MILLIGRAM(S): at 08:08

## 2018-10-20 RX ADMIN — GABAPENTIN 600 MILLIGRAM(S): 400 CAPSULE ORAL at 20:13

## 2018-10-20 RX ADMIN — HALOPERIDOL DECANOATE 7.5 MILLIGRAM(S): 100 INJECTION INTRAMUSCULAR at 08:08

## 2018-10-20 RX ADMIN — Medication 5 MILLIGRAM(S): at 20:12

## 2018-10-20 RX ADMIN — Medication 650 MILLIGRAM(S): at 14:16

## 2018-10-20 RX ADMIN — METFORMIN HYDROCHLORIDE 500 MILLIGRAM(S): 850 TABLET ORAL at 08:08

## 2018-10-20 RX ADMIN — BUDESONIDE AND FORMOTEROL FUMARATE DIHYDRATE 2 PUFF(S): 160; 4.5 AEROSOL RESPIRATORY (INHALATION) at 20:12

## 2018-10-20 RX ADMIN — ATORVASTATIN CALCIUM 10 MILLIGRAM(S): 80 TABLET, FILM COATED ORAL at 20:13

## 2018-10-20 RX ADMIN — GABAPENTIN 600 MILLIGRAM(S): 400 CAPSULE ORAL at 08:08

## 2018-10-20 RX ADMIN — ALBUTEROL 1 PUFF(S): 90 AEROSOL, METERED ORAL at 06:42

## 2018-10-20 RX ADMIN — GABAPENTIN 600 MILLIGRAM(S): 400 CAPSULE ORAL at 12:41

## 2018-10-20 RX ADMIN — Medication 1 PUFF(S): at 20:42

## 2018-10-20 RX ADMIN — BUDESONIDE AND FORMOTEROL FUMARATE DIHYDRATE 2 PUFF(S): 160; 4.5 AEROSOL RESPIRATORY (INHALATION) at 08:10

## 2018-10-20 RX ADMIN — PANTOPRAZOLE SODIUM 40 MILLIGRAM(S): 20 TABLET, DELAYED RELEASE ORAL at 08:09

## 2018-10-20 RX ADMIN — ALBUTEROL 1 PUFF(S): 90 AEROSOL, METERED ORAL at 16:25

## 2018-10-20 RX ADMIN — Medication 5 MILLIGRAM(S): at 08:06

## 2018-10-20 RX ADMIN — ALBUTEROL 1 PUFF(S): 90 AEROSOL, METERED ORAL at 20:52

## 2018-10-20 RX ADMIN — TAMSULOSIN HYDROCHLORIDE 0.4 MILLIGRAM(S): 0.4 CAPSULE ORAL at 20:12

## 2018-10-20 RX ADMIN — METFORMIN HYDROCHLORIDE 500 MILLIGRAM(S): 850 TABLET ORAL at 20:12

## 2018-10-20 RX ADMIN — DIVALPROEX SODIUM 250 MILLIGRAM(S): 500 TABLET, DELAYED RELEASE ORAL at 08:08

## 2018-10-20 RX ADMIN — ELVITEGRAVIR, COBICISTAT, EMTRICITABINE, AND TENOFOVIR ALAFENAMIDE 1 TABLET(S): 150; 150; 200; 10 TABLET ORAL at 08:13

## 2018-10-20 RX ADMIN — Medication 1 APPLICATION(S): at 08:06

## 2018-10-20 RX ADMIN — Medication 1 PUFF(S): at 08:11

## 2018-10-20 RX ADMIN — MONTELUKAST 10 MILLIGRAM(S): 4 TABLET, CHEWABLE ORAL at 08:08

## 2018-10-20 RX ADMIN — Medication 650 MILLIGRAM(S): at 14:50

## 2018-10-20 RX ADMIN — VALACYCLOVIR 1000 MILLIGRAM(S): 500 TABLET, FILM COATED ORAL at 08:13

## 2018-10-20 RX ADMIN — Medication 120 MILLIGRAM(S): at 08:07

## 2018-10-20 RX ADMIN — CLOPIDOGREL BISULFATE 75 MILLIGRAM(S): 75 TABLET, FILM COATED ORAL at 08:07

## 2018-10-20 RX ADMIN — Medication 1 DROP(S): at 08:11

## 2018-10-20 RX ADMIN — Medication 1 DROP(S): at 08:06

## 2018-10-20 RX ADMIN — Medication 5 MILLIGRAM(S): at 08:09

## 2018-10-21 RX ADMIN — DIVALPROEX SODIUM 1000 MILLIGRAM(S): 500 TABLET, DELAYED RELEASE ORAL at 20:34

## 2018-10-21 RX ADMIN — HALOPERIDOL DECANOATE 7.5 MILLIGRAM(S): 100 INJECTION INTRAMUSCULAR at 08:02

## 2018-10-21 RX ADMIN — Medication 1 APPLICATION(S): at 08:06

## 2018-10-21 RX ADMIN — MONTELUKAST 10 MILLIGRAM(S): 4 TABLET, CHEWABLE ORAL at 08:05

## 2018-10-21 RX ADMIN — BUDESONIDE AND FORMOTEROL FUMARATE DIHYDRATE 2 PUFF(S): 160; 4.5 AEROSOL RESPIRATORY (INHALATION) at 20:37

## 2018-10-21 RX ADMIN — Medication 5 MILLIGRAM(S): at 20:33

## 2018-10-21 RX ADMIN — METFORMIN HYDROCHLORIDE 500 MILLIGRAM(S): 850 TABLET ORAL at 08:02

## 2018-10-21 RX ADMIN — GABAPENTIN 600 MILLIGRAM(S): 400 CAPSULE ORAL at 20:33

## 2018-10-21 RX ADMIN — BUDESONIDE AND FORMOTEROL FUMARATE DIHYDRATE 2 PUFF(S): 160; 4.5 AEROSOL RESPIRATORY (INHALATION) at 08:02

## 2018-10-21 RX ADMIN — Medication 1 PUFF(S): at 08:02

## 2018-10-21 RX ADMIN — METFORMIN HYDROCHLORIDE 500 MILLIGRAM(S): 850 TABLET ORAL at 20:33

## 2018-10-21 RX ADMIN — Medication 5 MILLIGRAM(S): at 08:02

## 2018-10-21 RX ADMIN — HALOPERIDOL DECANOATE 10 MILLIGRAM(S): 100 INJECTION INTRAMUSCULAR at 20:33

## 2018-10-21 RX ADMIN — PANTOPRAZOLE SODIUM 40 MILLIGRAM(S): 20 TABLET, DELAYED RELEASE ORAL at 08:02

## 2018-10-21 RX ADMIN — DIVALPROEX SODIUM 250 MILLIGRAM(S): 500 TABLET, DELAYED RELEASE ORAL at 08:01

## 2018-10-21 RX ADMIN — Medication 1 DROP(S): at 08:05

## 2018-10-21 RX ADMIN — Medication 650 MILLIGRAM(S): at 09:28

## 2018-10-21 RX ADMIN — Medication 1 PUFF(S): at 20:37

## 2018-10-21 RX ADMIN — Medication 120 MILLIGRAM(S): at 08:01

## 2018-10-21 RX ADMIN — GABAPENTIN 600 MILLIGRAM(S): 400 CAPSULE ORAL at 12:26

## 2018-10-21 RX ADMIN — ELVITEGRAVIR, COBICISTAT, EMTRICITABINE, AND TENOFOVIR ALAFENAMIDE 1 TABLET(S): 150; 150; 200; 10 TABLET ORAL at 08:04

## 2018-10-21 RX ADMIN — ARIPIPRAZOLE 15 MILLIGRAM(S): 15 TABLET ORAL at 20:35

## 2018-10-21 RX ADMIN — CLOPIDOGREL BISULFATE 75 MILLIGRAM(S): 75 TABLET, FILM COATED ORAL at 08:01

## 2018-10-21 RX ADMIN — Medication 100 MILLIGRAM(S): at 08:05

## 2018-10-21 RX ADMIN — Medication 1 SPRAY(S): at 08:02

## 2018-10-21 RX ADMIN — ATORVASTATIN CALCIUM 10 MILLIGRAM(S): 80 TABLET, FILM COATED ORAL at 20:36

## 2018-10-21 RX ADMIN — Medication 5 MILLIGRAM(S): at 20:35

## 2018-10-21 RX ADMIN — GABAPENTIN 600 MILLIGRAM(S): 400 CAPSULE ORAL at 08:01

## 2018-10-21 RX ADMIN — Medication 81 MILLIGRAM(S): at 08:01

## 2018-10-21 RX ADMIN — VALACYCLOVIR 1000 MILLIGRAM(S): 500 TABLET, FILM COATED ORAL at 08:04

## 2018-10-21 RX ADMIN — Medication 5 MILLIGRAM(S): at 12:26

## 2018-10-21 RX ADMIN — TAMSULOSIN HYDROCHLORIDE 0.4 MILLIGRAM(S): 0.4 CAPSULE ORAL at 20:32

## 2018-10-21 NOTE — PROGRESS NOTE BEHAVIORAL HEALTH - NSBHFUPINTERVALHXFT_PSY_A_CORE
Pt seen and examined.  sleepy ,  superficially cooperative . still unpredictable. mood irritable.  remains suspicious,  paranoid . no acute event overnight.

## 2018-10-22 RX ORDER — ARIPIPRAZOLE 15 MG/1
20 TABLET ORAL AT BEDTIME
Qty: 0 | Refills: 0 | Status: DISCONTINUED | OUTPATIENT
Start: 2018-10-22 | End: 2018-10-29

## 2018-10-22 RX ORDER — HALOPERIDOL DECANOATE 100 MG/ML
10 INJECTION INTRAMUSCULAR
Qty: 0 | Refills: 0 | Status: DISCONTINUED | OUTPATIENT
Start: 2018-10-22 | End: 2018-10-29

## 2018-10-22 RX ORDER — DIVALPROEX SODIUM 500 MG/1
500 TABLET, DELAYED RELEASE ORAL
Qty: 0 | Refills: 0 | Status: DISCONTINUED | OUTPATIENT
Start: 2018-10-22 | End: 2018-10-29

## 2018-10-22 RX ADMIN — GABAPENTIN 600 MILLIGRAM(S): 400 CAPSULE ORAL at 20:42

## 2018-10-22 RX ADMIN — HALOPERIDOL DECANOATE 10 MILLIGRAM(S): 100 INJECTION INTRAMUSCULAR at 20:41

## 2018-10-22 RX ADMIN — ATORVASTATIN CALCIUM 10 MILLIGRAM(S): 80 TABLET, FILM COATED ORAL at 20:42

## 2018-10-22 RX ADMIN — METFORMIN HYDROCHLORIDE 500 MILLIGRAM(S): 850 TABLET ORAL at 20:41

## 2018-10-22 RX ADMIN — PANTOPRAZOLE SODIUM 40 MILLIGRAM(S): 20 TABLET, DELAYED RELEASE ORAL at 07:43

## 2018-10-22 RX ADMIN — Medication 120 MILLIGRAM(S): at 08:03

## 2018-10-22 RX ADMIN — ELVITEGRAVIR, COBICISTAT, EMTRICITABINE, AND TENOFOVIR ALAFENAMIDE 1 TABLET(S): 150; 150; 200; 10 TABLET ORAL at 08:13

## 2018-10-22 RX ADMIN — GABAPENTIN 600 MILLIGRAM(S): 400 CAPSULE ORAL at 12:25

## 2018-10-22 RX ADMIN — Medication 1 PUFF(S): at 15:48

## 2018-10-22 RX ADMIN — DIVALPROEX SODIUM 250 MILLIGRAM(S): 500 TABLET, DELAYED RELEASE ORAL at 07:44

## 2018-10-22 RX ADMIN — Medication 1 DROP(S): at 09:06

## 2018-10-22 RX ADMIN — Medication 5 MILLIGRAM(S): at 20:41

## 2018-10-22 RX ADMIN — HALOPERIDOL DECANOATE 7.5 MILLIGRAM(S): 100 INJECTION INTRAMUSCULAR at 08:10

## 2018-10-22 RX ADMIN — VALACYCLOVIR 1000 MILLIGRAM(S): 500 TABLET, FILM COATED ORAL at 08:13

## 2018-10-22 RX ADMIN — METFORMIN HYDROCHLORIDE 500 MILLIGRAM(S): 850 TABLET ORAL at 08:02

## 2018-10-22 RX ADMIN — DIVALPROEX SODIUM 1000 MILLIGRAM(S): 500 TABLET, DELAYED RELEASE ORAL at 20:42

## 2018-10-22 RX ADMIN — Medication 650 MILLIGRAM(S): at 20:42

## 2018-10-22 RX ADMIN — GABAPENTIN 600 MILLIGRAM(S): 400 CAPSULE ORAL at 08:06

## 2018-10-22 RX ADMIN — Medication 5 MILLIGRAM(S): at 08:02

## 2018-10-22 RX ADMIN — Medication 81 MILLIGRAM(S): at 08:04

## 2018-10-22 RX ADMIN — TAMSULOSIN HYDROCHLORIDE 0.4 MILLIGRAM(S): 0.4 CAPSULE ORAL at 20:41

## 2018-10-22 RX ADMIN — MONTELUKAST 10 MILLIGRAM(S): 4 TABLET, CHEWABLE ORAL at 08:12

## 2018-10-22 RX ADMIN — Medication 1 PUFF(S): at 07:45

## 2018-10-22 RX ADMIN — Medication 1 DROP(S): at 09:05

## 2018-10-22 RX ADMIN — Medication 5 MILLIGRAM(S): at 08:03

## 2018-10-22 RX ADMIN — CLOPIDOGREL BISULFATE 75 MILLIGRAM(S): 75 TABLET, FILM COATED ORAL at 08:03

## 2018-10-22 RX ADMIN — BUDESONIDE AND FORMOTEROL FUMARATE DIHYDRATE 2 PUFF(S): 160; 4.5 AEROSOL RESPIRATORY (INHALATION) at 07:44

## 2018-10-22 RX ADMIN — ARIPIPRAZOLE 20 MILLIGRAM(S): 15 TABLET ORAL at 20:41

## 2018-10-22 RX ADMIN — Medication 1 SPRAY(S): at 08:11

## 2018-10-22 RX ADMIN — Medication 1 APPLICATION(S): at 20:40

## 2018-10-22 RX ADMIN — Medication 5 MILLIGRAM(S): at 12:25

## 2018-10-22 NOTE — PROGRESS NOTE BEHAVIORAL HEALTH - MODIFICATIONS
seen/discussed with resident; pt's sx recurring as day progressed; he is calling his aunt with concerns for his safety. He vivienne me that staff has poison in their pocket, and he know this because he can smell it.  2:1 remains indicated based on persecutory delusions directed against staff as well as hx of self harm.

## 2018-10-22 NOTE — PROGRESS NOTE BEHAVIORAL HEALTH - NSBHFUPINTERVALHXFT_PSY_A_CORE
Patient examined at bedside this morning. Patient seen frequently wandering the halls and in the activity room. Patient remains on a 2-to-1 for unpredictable behavior. Patient reports that he has been feeling better and is wondering when he can go home. Patient states that his family and his doctor are concerned and would like to know exact date of his discharge. Patient denies anymore feelings of paranoia. Patient denies any overnight events. Patient is compliant with medications and has no other complaints. Patient denies suicidal thoughts or ideations. Patient examined at bedside this morning. Patient seen frequently wandering the halls and in the activity room. Patient remains on a 2-to-1 for unpredictable behavior. Patient reports that he has been feeling better and is wondering when he can go home. Patient states that his family and his doctor are concerned and would like to know exact date of his discharge. Patient denies anymore feelings of paranoia. Patient denies any overnight events. Patient is compliant with medications and has no other complaints. Patient denies suicidal thoughts or ideations.  Spoke with aunt at pt request.

## 2018-10-22 NOTE — CHART NOTE - NSCHARTNOTEFT_GEN_A_CORE
Social Work Note:    Patient is able to engage appropriately with this worker.  Discharge plan discussed with patient.  He will resume mental health treatment with his psychiatrist Dr. Robles.      At this time patient is not psychiatrically stable for discharge.

## 2018-10-22 NOTE — PROGRESS NOTE BEHAVIORAL HEALTH - SUMMARY
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 Robles [facility]; remote hx of attempted self strangulation; PMH HIV, DVT, atrial fibrillation, DM2, HTN, HLD BIB EMS after he [place] [facility] [address] as he was afraid that family members are coming to kill him.      Upon assessment, patient appears more organized and less paranoid. Patient is improving.     Psychiatric Plan: Continue abilify 15mg QHS, increase haldol to 7.5mgQdaily and 10mg QHS, continue diazepam 5mg tid, Continue Depakote ER 1000mg qhs and 250mg Qdaily, trazodone 50mg prn QHS,     Plan is to continue pt's home regimen of medication:  Genvoya 150mg qd, HLD-lipitor 10mg hs, DM2 : metoformin 500mg bid, DVT prophylaxis-aspirin 81mg, plavix, oxybutinin 5mg BID, Tamulosin 0.4mg qd, gabapentin 600mg tid

## 2018-10-23 RX ADMIN — Medication 1 PUFF(S): at 13:13

## 2018-10-23 RX ADMIN — Medication 1 PUFF(S): at 08:42

## 2018-10-23 RX ADMIN — MONTELUKAST 10 MILLIGRAM(S): 4 TABLET, CHEWABLE ORAL at 08:38

## 2018-10-23 RX ADMIN — METFORMIN HYDROCHLORIDE 500 MILLIGRAM(S): 850 TABLET ORAL at 20:16

## 2018-10-23 RX ADMIN — GABAPENTIN 600 MILLIGRAM(S): 400 CAPSULE ORAL at 20:16

## 2018-10-23 RX ADMIN — Medication 1 APPLICATION(S): at 20:16

## 2018-10-23 RX ADMIN — HALOPERIDOL DECANOATE 10 MILLIGRAM(S): 100 INJECTION INTRAMUSCULAR at 08:38

## 2018-10-23 RX ADMIN — Medication 5 MILLIGRAM(S): at 20:15

## 2018-10-23 RX ADMIN — DIVALPROEX SODIUM 1000 MILLIGRAM(S): 500 TABLET, DELAYED RELEASE ORAL at 20:16

## 2018-10-23 RX ADMIN — CLOPIDOGREL BISULFATE 75 MILLIGRAM(S): 75 TABLET, FILM COATED ORAL at 08:39

## 2018-10-23 RX ADMIN — ARIPIPRAZOLE 20 MILLIGRAM(S): 15 TABLET ORAL at 20:16

## 2018-10-23 RX ADMIN — HALOPERIDOL DECANOATE 10 MILLIGRAM(S): 100 INJECTION INTRAMUSCULAR at 20:16

## 2018-10-23 RX ADMIN — Medication 100 MILLIGRAM(S): at 08:39

## 2018-10-23 RX ADMIN — Medication 1 DROP(S): at 08:43

## 2018-10-23 RX ADMIN — ELVITEGRAVIR, COBICISTAT, EMTRICITABINE, AND TENOFOVIR ALAFENAMIDE 1 TABLET(S): 150; 150; 200; 10 TABLET ORAL at 08:42

## 2018-10-23 RX ADMIN — VALACYCLOVIR 1000 MILLIGRAM(S): 500 TABLET, FILM COATED ORAL at 08:39

## 2018-10-23 RX ADMIN — ATORVASTATIN CALCIUM 10 MILLIGRAM(S): 80 TABLET, FILM COATED ORAL at 20:16

## 2018-10-23 RX ADMIN — TAMSULOSIN HYDROCHLORIDE 0.4 MILLIGRAM(S): 0.4 CAPSULE ORAL at 20:15

## 2018-10-23 RX ADMIN — DIVALPROEX SODIUM 500 MILLIGRAM(S): 500 TABLET, DELAYED RELEASE ORAL at 08:38

## 2018-10-23 RX ADMIN — Medication 81 MILLIGRAM(S): at 08:39

## 2018-10-23 RX ADMIN — GABAPENTIN 600 MILLIGRAM(S): 400 CAPSULE ORAL at 12:14

## 2018-10-23 RX ADMIN — Medication 5 MILLIGRAM(S): at 08:38

## 2018-10-23 RX ADMIN — METFORMIN HYDROCHLORIDE 500 MILLIGRAM(S): 850 TABLET ORAL at 08:38

## 2018-10-23 RX ADMIN — Medication 1 APPLICATION(S): at 08:43

## 2018-10-23 RX ADMIN — GABAPENTIN 600 MILLIGRAM(S): 400 CAPSULE ORAL at 08:38

## 2018-10-23 RX ADMIN — BUDESONIDE AND FORMOTEROL FUMARATE DIHYDRATE 2 PUFF(S): 160; 4.5 AEROSOL RESPIRATORY (INHALATION) at 08:40

## 2018-10-23 RX ADMIN — Medication 1 APPLICATION(S): at 08:41

## 2018-10-23 RX ADMIN — Medication 120 MILLIGRAM(S): at 08:39

## 2018-10-23 RX ADMIN — Medication 5 MILLIGRAM(S): at 12:14

## 2018-10-23 RX ADMIN — Medication 100 MILLIGRAM(S): at 20:16

## 2018-10-23 RX ADMIN — PANTOPRAZOLE SODIUM 40 MILLIGRAM(S): 20 TABLET, DELAYED RELEASE ORAL at 08:38

## 2018-10-23 RX ADMIN — Medication 1 DROP(S): at 09:29

## 2018-10-23 RX ADMIN — Medication 5 MILLIGRAM(S): at 08:44

## 2018-10-23 RX ADMIN — Medication 1 SPRAY(S): at 08:41

## 2018-10-23 RX ADMIN — Medication 1 SPRAY(S): at 08:40

## 2018-10-23 NOTE — PROGRESS NOTE BEHAVIORAL HEALTH - MODIFICATIONS
pt seen/discussed with resident. He submitted  3 day letter requesting d/c, but as of yesterday he remained paranoid with persecutory delusions.  Not yet stable for d/c; attorneys aware-will proceed with retention

## 2018-10-23 NOTE — PROGRESS NOTE BEHAVIORAL HEALTH - SUMMARY
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 Robles [facility]; remote hx of attempted self strangulation; PMH HIV, DVT, atrial fibrillation, DM2, HTN, HLD BIB EMS after he activated [place] [employer] [address] as he was afraid that family members are coming to kill him.      Upon assessment, patient appears more organized and less paranoid. Patient is improving yet still has bouts of paranoia and has to remain on a 2-1 for unpredictable behavior.      Psychiatric Plan: Continue abilify 15mg QHS, increase haldol to 7.5mgQdaily and 10mg QHS, continue diazepam 5mg tid, Continue Depakote ER 1000mg qhs and 250mg Qdaily, trazodone 50mg prn QHS,     Plan is to continue pt's home regimen of medication:  Genvoya 150mg qd, HLD-lipitor 10mg hs, DM2 : metoformin 500mg bid, DVT prophylaxis-aspirin 81mg, plavix, oxybutinin 5mg BID, Tamulosin 0.4mg qd, gabapentin 600mg tid

## 2018-10-23 NOTE — PROGRESS NOTE BEHAVIORAL HEALTH - NSBHFUPINTERVALHXFT_PSY_A_CORE
Patient examined at bedside this morning. Patient seen frequently wandering the halls and in the activity room. Patient remains on a 2-to-1 for unpredictable behavior. Patient reports that his  is arriving today and "signing me out". Patient states that he wrote a 72 hour notice earlier in his admission that was ignored but today he's going back home. Patient endorses he wants to get back to his life. Per nursing reports, patient dialed 911 yesterday and remained very paranoid throughout the day. Patient denies these reports blaming another patient for calling 911. Patient denies any overnight events. Patient is compliant with medications and has no other complaints. Patient denies suicidal thoughts or ideations.

## 2018-10-24 RX ORDER — DIAZEPAM 5 MG
5 TABLET ORAL EVERY 8 HOURS
Qty: 0 | Refills: 0 | Status: DISCONTINUED | OUTPATIENT
Start: 2018-10-24 | End: 2018-10-29

## 2018-10-24 RX ADMIN — GABAPENTIN 600 MILLIGRAM(S): 400 CAPSULE ORAL at 20:17

## 2018-10-24 RX ADMIN — GABAPENTIN 600 MILLIGRAM(S): 400 CAPSULE ORAL at 13:49

## 2018-10-24 RX ADMIN — Medication 1 SPRAY(S): at 08:46

## 2018-10-24 RX ADMIN — METFORMIN HYDROCHLORIDE 500 MILLIGRAM(S): 850 TABLET ORAL at 20:17

## 2018-10-24 RX ADMIN — PANTOPRAZOLE SODIUM 40 MILLIGRAM(S): 20 TABLET, DELAYED RELEASE ORAL at 08:37

## 2018-10-24 RX ADMIN — ATORVASTATIN CALCIUM 10 MILLIGRAM(S): 80 TABLET, FILM COATED ORAL at 20:17

## 2018-10-24 RX ADMIN — DIVALPROEX SODIUM 1000 MILLIGRAM(S): 500 TABLET, DELAYED RELEASE ORAL at 20:16

## 2018-10-24 RX ADMIN — Medication 120 MILLIGRAM(S): at 08:41

## 2018-10-24 RX ADMIN — VALACYCLOVIR 1000 MILLIGRAM(S): 500 TABLET, FILM COATED ORAL at 08:51

## 2018-10-24 RX ADMIN — Medication 5 MILLIGRAM(S): at 20:17

## 2018-10-24 RX ADMIN — ELVITEGRAVIR, COBICISTAT, EMTRICITABINE, AND TENOFOVIR ALAFENAMIDE 1 TABLET(S): 150; 150; 200; 10 TABLET ORAL at 08:50

## 2018-10-24 RX ADMIN — Medication 1 DROP(S): at 08:52

## 2018-10-24 RX ADMIN — HALOPERIDOL DECANOATE 10 MILLIGRAM(S): 100 INJECTION INTRAMUSCULAR at 20:17

## 2018-10-24 RX ADMIN — CLOPIDOGREL BISULFATE 75 MILLIGRAM(S): 75 TABLET, FILM COATED ORAL at 08:40

## 2018-10-24 RX ADMIN — Medication 5 MILLIGRAM(S): at 08:44

## 2018-10-24 RX ADMIN — MONTELUKAST 10 MILLIGRAM(S): 4 TABLET, CHEWABLE ORAL at 08:44

## 2018-10-24 RX ADMIN — TAMSULOSIN HYDROCHLORIDE 0.4 MILLIGRAM(S): 0.4 CAPSULE ORAL at 20:17

## 2018-10-24 RX ADMIN — HALOPERIDOL DECANOATE 10 MILLIGRAM(S): 100 INJECTION INTRAMUSCULAR at 08:42

## 2018-10-24 RX ADMIN — ARIPIPRAZOLE 20 MILLIGRAM(S): 15 TABLET ORAL at 20:18

## 2018-10-24 RX ADMIN — Medication 1 PUFF(S): at 08:38

## 2018-10-24 RX ADMIN — Medication 1 DROP(S): at 08:45

## 2018-10-24 RX ADMIN — DIVALPROEX SODIUM 500 MILLIGRAM(S): 500 TABLET, DELAYED RELEASE ORAL at 08:39

## 2018-10-24 RX ADMIN — Medication 81 MILLIGRAM(S): at 08:39

## 2018-10-24 RX ADMIN — GABAPENTIN 600 MILLIGRAM(S): 400 CAPSULE ORAL at 08:43

## 2018-10-24 RX ADMIN — BUDESONIDE AND FORMOTEROL FUMARATE DIHYDRATE 2 PUFF(S): 160; 4.5 AEROSOL RESPIRATORY (INHALATION) at 08:47

## 2018-10-24 RX ADMIN — Medication 1 PUFF(S): at 13:49

## 2018-10-24 RX ADMIN — METFORMIN HYDROCHLORIDE 500 MILLIGRAM(S): 850 TABLET ORAL at 08:43

## 2018-10-24 NOTE — PROGRESS NOTE BEHAVIORAL HEALTH - NSBHFUPINTERVALHXFT_PSY_A_CORE
Patient examined at bedside this morning. Patient remains on a 2-to-1 for unpredictable behavior. Per nursing reports, patient had dialed his aunt 59 times yesterday within a span of a few hours. Patient reports that he needs to get back to his regular life. Patient endorses feeling safe and denies any reported events. Patient denies any overnight events. Patient is compliant with medications and has no other complaints. Patient denies suicidal thoughts or ideations.

## 2018-10-24 NOTE — PROGRESS NOTE BEHAVIORAL HEALTH - SUMMARY
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 Robles [facility]; remote hx of attempted self strangulation; PMH HIV, DVT, atrial fibrillation, DM2, HTN, HLD BIB EMS after he activated 911 [place] [address] as he was afraid that family members are coming to kill him.      Upon assessment, patient appears more organized and less paranoid. Patient is improving yet still has bouts of paranoia and has to remain on a 2-1 for unpredictable behavior.      Psychiatric Plan: Continue abilify 15mg QHS, increase haldol to 7.5mgQdaily and 10mg QHS, continue diazepam 5mg tid, Continue Depakote ER 1000mg qhs and 250mg Qdaily, trazodone 50mg prn QHS,     Plan is to continue pt's home regimen of medication:  Genvoya 150mg qd, HLD-lipitor 10mg hs, DM2 : metoformin 500mg bid, DVT prophylaxis-aspirin 81mg, plavix, oxybutinin 5mg BID, Tamulosin 0.4mg qd, gabapentin 600mg tid

## 2018-10-24 NOTE — CHART NOTE - NSCHARTNOTEFT_GEN_A_CORE
Social Work Note:    Patient remains on a 2:1 at this time.  Thought process is disorganized.  Sleep is poor.  During the day patient is observed to be walking around the unit.     Plan is for patient to resume outpatient mental health treatment with his psychiatrist Dr. Robles.     At this time patient is not psychiatrically stable for discharge.

## 2018-10-24 NOTE — PROGRESS NOTE BEHAVIORAL HEALTH - MODIFICATIONS
seen/discussed with resident, Pt denies aud hallucinations and suicidal ideation, and states he feels fine and wants to go home.  He remains paranoid; was seen in lunchroom pouring soup into plastic bag, and was insulting people by race last night. He states "no, I wasn't". He was interviewed today by [facility]

## 2018-10-25 RX ADMIN — HALOPERIDOL DECANOATE 10 MILLIGRAM(S): 100 INJECTION INTRAMUSCULAR at 08:09

## 2018-10-25 RX ADMIN — ARIPIPRAZOLE 20 MILLIGRAM(S): 15 TABLET ORAL at 20:08

## 2018-10-25 RX ADMIN — METFORMIN HYDROCHLORIDE 500 MILLIGRAM(S): 850 TABLET ORAL at 20:08

## 2018-10-25 RX ADMIN — Medication 81 MILLIGRAM(S): at 08:10

## 2018-10-25 RX ADMIN — Medication 5 MILLIGRAM(S): at 20:08

## 2018-10-25 RX ADMIN — GABAPENTIN 600 MILLIGRAM(S): 400 CAPSULE ORAL at 08:10

## 2018-10-25 RX ADMIN — ATORVASTATIN CALCIUM 10 MILLIGRAM(S): 80 TABLET, FILM COATED ORAL at 20:08

## 2018-10-25 RX ADMIN — DIVALPROEX SODIUM 1000 MILLIGRAM(S): 500 TABLET, DELAYED RELEASE ORAL at 20:09

## 2018-10-25 RX ADMIN — TAMSULOSIN HYDROCHLORIDE 0.4 MILLIGRAM(S): 0.4 CAPSULE ORAL at 20:08

## 2018-10-25 RX ADMIN — Medication 50 MILLIGRAM(S): at 20:09

## 2018-10-25 RX ADMIN — GABAPENTIN 600 MILLIGRAM(S): 400 CAPSULE ORAL at 14:19

## 2018-10-25 RX ADMIN — Medication 5 MILLIGRAM(S): at 17:15

## 2018-10-25 RX ADMIN — HALOPERIDOL DECANOATE 10 MILLIGRAM(S): 100 INJECTION INTRAMUSCULAR at 20:08

## 2018-10-25 RX ADMIN — Medication 5 MILLIGRAM(S): at 08:10

## 2018-10-25 RX ADMIN — GABAPENTIN 600 MILLIGRAM(S): 400 CAPSULE ORAL at 20:08

## 2018-10-25 RX ADMIN — Medication 5 MILLIGRAM(S): at 08:13

## 2018-10-25 RX ADMIN — CLOPIDOGREL BISULFATE 75 MILLIGRAM(S): 75 TABLET, FILM COATED ORAL at 08:10

## 2018-10-25 RX ADMIN — ELVITEGRAVIR, COBICISTAT, EMTRICITABINE, AND TENOFOVIR ALAFENAMIDE 1 TABLET(S): 150; 150; 200; 10 TABLET ORAL at 08:11

## 2018-10-25 RX ADMIN — DIVALPROEX SODIUM 500 MILLIGRAM(S): 500 TABLET, DELAYED RELEASE ORAL at 08:09

## 2018-10-25 RX ADMIN — METFORMIN HYDROCHLORIDE 500 MILLIGRAM(S): 850 TABLET ORAL at 08:09

## 2018-10-25 NOTE — PROGRESS NOTE BEHAVIORAL HEALTH - SUMMARY
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 Robles [facility]; remote hx of attempted self strangulation; PMH HIV, DVT, atrial fibrillation, DM2, HTN, HLD BIB EMS after he activated 911 [place] the [address] as he was afraid that family members are coming to kill him.      Upon assessment, patient appears more organized and less paranoid. Patient is improving yet still has bouts of paranoia and has to remain on a 2-1 for unpredictable behavior.      Psychiatric Plan: Continue Abilify 20mg QHS, increase haldol to 10 BID, continue diazepam 5mg tid, Continue Depakote ER 1000mg qhs and 500mg Qdaily, trazodone 50mg prn QHS,     Plan is to continue pt's home regimen of medication:  Genvoya 150mg qd, HLD-lipitor 10mg hs, DM2 : metoformin 500mg bid, DVT prophylaxis-aspirin 81mg, plavix, oxybutinin 5mg BID, Tamulosin 0.4mg qd, gabapentin 600mg tid

## 2018-10-25 NOTE — PROGRESS NOTE BEHAVIORAL HEALTH - NSBHFUPINTERVALHXFT_PSY_A_CORE
Patient examined at bedside this morning. Patient remains on a 2-to-1 for unpredictable behavior. Per nursing reports, patient has been discussing sexually inappropriate stuff with other male patients about his sexuality and patient has not been sleeping at night. Patient endorses feeling safe and denies any reported events. Patient is compliant with medications and has no other complaints. Patient denies suicidal thoughts or ideations.

## 2018-10-25 NOTE — PROGRESS NOTE BEHAVIORAL HEALTH - MODIFICATIONS
seen/discussed with resident. Improvement noted, however  pt continues to deny when confronted with any behaviors brought to his attention

## 2018-10-26 RX ADMIN — DIVALPROEX SODIUM 500 MILLIGRAM(S): 500 TABLET, DELAYED RELEASE ORAL at 08:29

## 2018-10-26 RX ADMIN — ATORVASTATIN CALCIUM 10 MILLIGRAM(S): 80 TABLET, FILM COATED ORAL at 20:12

## 2018-10-26 RX ADMIN — GABAPENTIN 600 MILLIGRAM(S): 400 CAPSULE ORAL at 08:30

## 2018-10-26 RX ADMIN — GABAPENTIN 600 MILLIGRAM(S): 400 CAPSULE ORAL at 20:12

## 2018-10-26 RX ADMIN — DIVALPROEX SODIUM 1000 MILLIGRAM(S): 500 TABLET, DELAYED RELEASE ORAL at 20:12

## 2018-10-26 RX ADMIN — METFORMIN HYDROCHLORIDE 500 MILLIGRAM(S): 850 TABLET ORAL at 08:29

## 2018-10-26 RX ADMIN — CLOPIDOGREL BISULFATE 75 MILLIGRAM(S): 75 TABLET, FILM COATED ORAL at 08:30

## 2018-10-26 RX ADMIN — METFORMIN HYDROCHLORIDE 500 MILLIGRAM(S): 850 TABLET ORAL at 20:11

## 2018-10-26 RX ADMIN — HALOPERIDOL DECANOATE 10 MILLIGRAM(S): 100 INJECTION INTRAMUSCULAR at 08:29

## 2018-10-26 RX ADMIN — GABAPENTIN 600 MILLIGRAM(S): 400 CAPSULE ORAL at 13:51

## 2018-10-26 RX ADMIN — Medication 81 MILLIGRAM(S): at 08:29

## 2018-10-26 RX ADMIN — Medication 5 MILLIGRAM(S): at 20:11

## 2018-10-26 RX ADMIN — Medication 5 MILLIGRAM(S): at 08:35

## 2018-10-26 RX ADMIN — Medication 5 MILLIGRAM(S): at 08:30

## 2018-10-26 RX ADMIN — ELVITEGRAVIR, COBICISTAT, EMTRICITABINE, AND TENOFOVIR ALAFENAMIDE 1 TABLET(S): 150; 150; 200; 10 TABLET ORAL at 08:31

## 2018-10-26 RX ADMIN — HALOPERIDOL DECANOATE 10 MILLIGRAM(S): 100 INJECTION INTRAMUSCULAR at 20:12

## 2018-10-26 RX ADMIN — TAMSULOSIN HYDROCHLORIDE 0.4 MILLIGRAM(S): 0.4 CAPSULE ORAL at 20:11

## 2018-10-26 RX ADMIN — ARIPIPRAZOLE 20 MILLIGRAM(S): 15 TABLET ORAL at 20:12

## 2018-10-26 NOTE — PROGRESS NOTE BEHAVIORAL HEALTH - NSBHLOC_PSY_A_CORE
Alert

## 2018-10-26 NOTE — PROGRESS NOTE BEHAVIORAL HEALTH - NSBHCHARTREVIEWVS_PSY_A_CORE FT
ICU Vital Signs Last 24 Hrs  T(C): 36.7 (04 Oct 2018 10:36), Max: 36.7 (04 Oct 2018 10:36)  T(F): 98.1 (04 Oct 2018 10:36), Max: 98.1 (04 Oct 2018 10:36)  HR: 79 (04 Oct 2018 10:36) (79 - 98)  BP: 130/71 (04 Oct 2018 10:36) (119/81 - 130/74)  BP(mean): --  ABP: --  ABP(mean): --  RR: 18 (04 Oct 2018 10:36) (18 - 18)  SpO2: --
ICU Vital Signs Last 24 Hrs  T(C): 36.4 (27 Sep 2018 09:40), Max: 36.4 (27 Sep 2018 06:32)  T(F): 97.5 (27 Sep 2018 09:40), Max: 97.6 (27 Sep 2018 06:32)  HR: 72 (27 Sep 2018 09:40) (70 - 87)  BP: 119/71 (27 Sep 2018 09:40) (108/74 - 121/87)  BP(mean): --  ABP: --  ABP(mean): --  RR: 20 (27 Sep 2018 09:40) (16 - 20)  SpO2: --
Vital Signs Last 24 Hrs  T(C): 35.8 (14 Oct 2018 06:23), Max: 36.6 (13 Oct 2018 18:11)  T(F): 96.5 (14 Oct 2018 06:23), Max: 97.8 (13 Oct 2018 18:11)  HR: 69 (14 Oct 2018 06:23) (69 - 82)  BP: 130/61 (14 Oct 2018 06:23) (126/67 - 145/71)  BP(mean): --  RR: 18 (14 Oct 2018 06:23) (16 - 18)  SpO2: --
ICU Vital Signs Last 24 Hrs  T(C): 36.1 (08 Oct 2018 10:00), Max: 36.8 (08 Oct 2018 06:25)  T(F): 96.9 (08 Oct 2018 10:00), Max: 98.3 (08 Oct 2018 06:25)  HR: 86 (08 Oct 2018 10:00) (86 - 108)  BP: 143/90 (08 Oct 2018 10:00) (95/76 - 143/90)  BP(mean): --  ABP: --  ABP(mean): --  RR: 16 (08 Oct 2018 10:00) (16 - 16)  SpO2: --
ICU Vital Signs Last 24 Hrs  T(C): 36.4 (25 Sep 2018 09:34), Max: 36.9 (24 Sep 2018 18:44)  T(F): 97.5 (25 Sep 2018 09:34), Max: 98.5 (24 Sep 2018 18:44)  HR: 99 (25 Sep 2018 09:34) (72 - 99)  BP: 92/68 (25 Sep 2018 09:34) (92/68 - 129/82)  BP(mean): --  ABP: --  ABP(mean): --  RR: 17 (25 Sep 2018 09:34) (17 - 18)  SpO2: --
ICU Vital Signs Last 24 Hrs  ICU Vital Signs Last 24 Hrs  T(C): 36.1 (01 Oct 2018 16:00), Max: 36.1 (01 Oct 2018 16:00)  T(F): 96.9 (01 Oct 2018 16:00), Max: 96.9 (01 Oct 2018 16:00)  HR: 77 (01 Oct 2018 16:00) (77 - 92)  BP: 100/68 (01 Oct 2018 16:00) (100/68 - 115/58)  BP(mean): --  ABP: --  ABP(mean): --  RR: 18 (01 Oct 2018 16:00) (16 - 18)  SpO2: --
ICU Vital Signs Last 24 Hrs  T(C): 35.7 (22 Oct 2018 10:40), Max: 36.7 (22 Oct 2018 06:56)  T(F): 96.2 (22 Oct 2018 10:40), Max: 98.1 (22 Oct 2018 06:56)  HR: 103 (22 Oct 2018 10:40) (66 - 103)  BP: 116/78 (22 Oct 2018 10:40) (116/78 - 132/80)  BP(mean): --  ABP: --  ABP(mean): --  RR: 18 (22 Oct 2018 10:40) (16 - 18)  SpO2: --
ICU Vital Signs Last 24 Hrs  T(C): 36 (01 Oct 2018 07:00), Max: 36.1 (30 Sep 2018 16:30)  T(F): 96.8 (01 Oct 2018 07:00), Max: 97 (30 Sep 2018 16:30)  HR: 77 (01 Oct 2018 07:00) (76 - 77)  BP: 105/57 (01 Oct 2018 07:00) (105/57 - 116/60)  BP(mean): --  ABP: --  ABP(mean): --  RR: 16 (01 Oct 2018 07:00) (16 - 16)  SpO2: --
ICU Vital Signs Last 24 Hrs  T(C): 36 (12 Oct 2018 11:00), Max: 36.1 (12 Oct 2018 06:04)  T(F): 96.8 (12 Oct 2018 11:00), Max: 96.9 (12 Oct 2018 06:04)  HR: 97 (12 Oct 2018 11:00) (77 - 97)  BP: 142/74 (12 Oct 2018 11:00) (142/74 - 143/77)  BP(mean): --  ABP: --  ABP(mean): --  RR: 16 (12 Oct 2018 11:00) (16 - 16)  SpO2: --
ICU Vital Signs Last 24 Hrs  T(C): 36.1 (19 Oct 2018 12:30), Max: 36.1 (19 Oct 2018 12:30)  T(F): 97 (19 Oct 2018 12:30), Max: 97 (19 Oct 2018 12:30)  HR: 97 (19 Oct 2018 12:30) (77 - 97)  BP: 138/83 (19 Oct 2018 12:30) (117/64 - 138/83)  BP(mean): --  ABP: --  ABP(mean): --  RR: 17 (19 Oct 2018 12:30) (17 - 18)  SpO2: --
ICU Vital Signs Last 24 Hrs  T(C): 36.3 (28 Sep 2018 10:42), Max: 36.7 (28 Sep 2018 05:51)  T(F): 97.3 (28 Sep 2018 10:42), Max: 98.1 (28 Sep 2018 05:51)  HR: 83 (28 Sep 2018 10:42) (75 - 83)  BP: 119/72 (28 Sep 2018 10:42) (107/74 - 125/78)  BP(mean): --  ABP: --  ABP(mean): --  RR: 18 (28 Sep 2018 10:42) (17 - 20)  SpO2: --
ICU Vital Signs Last 24 Hrs  T(C): 36.7 (18 Oct 2018 09:36), Max: 37.5 (17 Oct 2018 18:47)  T(F): 98.1 (18 Oct 2018 09:36), Max: 99.5 (17 Oct 2018 18:47)  HR: 60 (18 Oct 2018 09:36) (55 - 72)  BP: 117/60 (18 Oct 2018 09:36) (117/60 - 138/64)  BP(mean): --  ABP: --  ABP(mean): --  RR: 18 (18 Oct 2018 09:36) (18 - 18)  SpO2: --
ICU Vital Signs Last 24 Hrs  T(C): 36.8 (16 Oct 2018 10:19), Max: 36.8 (16 Oct 2018 10:19)  T(F): 98.2 (16 Oct 2018 10:19), Max: 98.2 (16 Oct 2018 10:19)  HR: 85 (16 Oct 2018 10:19) (63 - 96)  BP: 115/67 (16 Oct 2018 10:19) (115/67 - 151/83)  BP(mean): --  ABP: --  ABP(mean): --  RR: 18 (16 Oct 2018 10:19) (16 - 18)  SpO2: --
T(C): 36.8 (09-29-18 @ 09:30), Max: 36.8 (09-29-18 @ 09:30)  HR: 91 (09-29-18 @ 09:30) (66 - 91)  BP: 115/59 (09-29-18 @ 09:30) (115/59 - 126/66)  RR: 18 (09-29-18 @ 09:30) (16 - 18)  SpO2: --
T(C): 36.9 (10-13-18 @ 09:10), Max: 36.9 (10-13-18 @ 09:10)  HR: 81 (10-13-18 @ 17:21) (60 - 108)  BP: 145/71 (10-13-18 @ 17:21) (127/80 - 145/71)  RR: 16 (10-13-18 @ 17:21) (16 - 18)  SpO2: --
ICU Vital Signs Last 24 Hrs  T(C): 36.1 (08 Oct 2018 18:17), Max: 36.1 (08 Oct 2018 18:17)  T(F): 97 (08 Oct 2018 18:17), Max: 97 (08 Oct 2018 18:17)  HR: 61 (08 Oct 2018 18:17) (61 - 61)  BP: 116/62 (08 Oct 2018 18:17) (116/62 - 116/62)  BP(mean): --  ABP: --  ABP(mean): --  RR: 16 (08 Oct 2018 18:17) (16 - 16)  SpO2: --
ICU Vital Signs Last 24 Hrs  T(C): 35.7 (26 Oct 2018 09:30), Max: 35.9 (25 Oct 2018 18:10)  T(F): 96.3 (26 Oct 2018 09:30), Max: 96.6 (25 Oct 2018 18:10)  HR: 117 (26 Oct 2018 09:30) (89 - 117)  BP: 135/83 (26 Oct 2018 09:30) (135/80 - 135/83)  BP(mean): --  ABP: --  ABP(mean): --  RR: 18 (26 Oct 2018 09:30) (16 - 18)  SpO2: --
ICU Vital Signs Last 24 Hrs  T(C): 35.6 (15 Oct 2018 09:38), Max: 36.2 (14 Oct 2018 19:15)  T(F): 96 (15 Oct 2018 09:38), Max: 97.1 (14 Oct 2018 19:15)  HR: 84 (15 Oct 2018 09:38) (84 - 99)  BP: 113/67 (15 Oct 2018 09:38) (113/67 - 171/78)  BP(mean): --  ABP: --  ABP(mean): --  RR: 18 (15 Oct 2018 09:38) (18 - 18)  SpO2: --
ICU Vital Signs Last 24 Hrs  T(C): 36.2 (11 Oct 2018 06:11), Max: 36.2 (11 Oct 2018 06:11)  T(F): 97.2 (11 Oct 2018 06:11), Max: 97.2 (11 Oct 2018 06:11)  HR: 99 (11 Oct 2018 06:11) (96 - 99)  BP: 127/71 (11 Oct 2018 06:11) (127/71 - 137/70)  BP(mean): --  ABP: --  ABP(mean): --  RR: 18 (11 Oct 2018 06:11) (16 - 18)  SpO2: --

## 2018-10-26 NOTE — PROGRESS NOTE BEHAVIORAL HEALTH - MODIFICATIONS
seen/discussed with resident. no overt delusions; no s/h ideation. Will switch to 1:1 based on improvement

## 2018-10-26 NOTE — PROGRESS NOTE BEHAVIORAL HEALTH - NSBHFUPINTERVALHXFT_PSY_A_CORE
Patient seen and examined during team meeting this morning. Patient downgraded from a 2-1 to a 1-1 as behavior has been improving. Per nursing reports, patient remains fixated on using the phone and has to be constantly reminded that he cannot be making phone calls 24/7. Patient endorses feeling safe and denies any reported events. Patient is compliant with medications and has no other complaints. Patient is amenable to court on Monday. Patient denies suicidal thoughts or ideations.

## 2018-10-26 NOTE — PROGRESS NOTE BEHAVIORAL HEALTH - NS ED [facility] REVIEW OF ED CHART AVAILABLE LABS REVIEWED
None available

## 2018-10-26 NOTE — PROGRESS NOTE BEHAVIORAL HEALTH - NSBHATTESTNPTRAINEE_PSY_A_CORE
agree

## 2018-10-27 RX ORDER — MONTELUKAST 4 MG/1
10 TABLET, CHEWABLE ORAL ONCE
Qty: 0 | Refills: 0 | Status: COMPLETED | OUTPATIENT
Start: 2018-10-27 | End: 2018-10-27

## 2018-10-27 RX ADMIN — METFORMIN HYDROCHLORIDE 500 MILLIGRAM(S): 850 TABLET ORAL at 20:17

## 2018-10-27 RX ADMIN — GABAPENTIN 600 MILLIGRAM(S): 400 CAPSULE ORAL at 08:35

## 2018-10-27 RX ADMIN — DIVALPROEX SODIUM 500 MILLIGRAM(S): 500 TABLET, DELAYED RELEASE ORAL at 08:33

## 2018-10-27 RX ADMIN — Medication 81 MILLIGRAM(S): at 08:34

## 2018-10-27 RX ADMIN — Medication 5 MILLIGRAM(S): at 20:17

## 2018-10-27 RX ADMIN — ELVITEGRAVIR, COBICISTAT, EMTRICITABINE, AND TENOFOVIR ALAFENAMIDE 1 TABLET(S): 150; 150; 200; 10 TABLET ORAL at 08:40

## 2018-10-27 RX ADMIN — CLOPIDOGREL BISULFATE 75 MILLIGRAM(S): 75 TABLET, FILM COATED ORAL at 08:34

## 2018-10-27 RX ADMIN — MONTELUKAST 10 MILLIGRAM(S): 4 TABLET, CHEWABLE ORAL at 10:30

## 2018-10-27 RX ADMIN — GABAPENTIN 600 MILLIGRAM(S): 400 CAPSULE ORAL at 13:06

## 2018-10-27 RX ADMIN — HALOPERIDOL DECANOATE 10 MILLIGRAM(S): 100 INJECTION INTRAMUSCULAR at 08:35

## 2018-10-27 RX ADMIN — Medication 5 MILLIGRAM(S): at 09:10

## 2018-10-27 RX ADMIN — METFORMIN HYDROCHLORIDE 500 MILLIGRAM(S): 850 TABLET ORAL at 08:38

## 2018-10-27 RX ADMIN — TAMSULOSIN HYDROCHLORIDE 0.4 MILLIGRAM(S): 0.4 CAPSULE ORAL at 22:20

## 2018-10-27 RX ADMIN — Medication 5 MILLIGRAM(S): at 08:37

## 2018-10-27 RX ADMIN — HALOPERIDOL DECANOATE 10 MILLIGRAM(S): 100 INJECTION INTRAMUSCULAR at 20:17

## 2018-10-27 RX ADMIN — ARIPIPRAZOLE 20 MILLIGRAM(S): 15 TABLET ORAL at 20:17

## 2018-10-27 RX ADMIN — BUDESONIDE AND FORMOTEROL FUMARATE DIHYDRATE 2 PUFF(S): 160; 4.5 AEROSOL RESPIRATORY (INHALATION) at 10:15

## 2018-10-27 RX ADMIN — ATORVASTATIN CALCIUM 10 MILLIGRAM(S): 80 TABLET, FILM COATED ORAL at 20:17

## 2018-10-27 RX ADMIN — GABAPENTIN 600 MILLIGRAM(S): 400 CAPSULE ORAL at 20:17

## 2018-10-27 RX ADMIN — DIVALPROEX SODIUM 1000 MILLIGRAM(S): 500 TABLET, DELAYED RELEASE ORAL at 20:17

## 2018-10-27 NOTE — PROGRESS NOTE BEHAVIORAL HEALTH - NS ED [facility] MED ROS ENT MOUTH
No complaints

## 2018-10-27 NOTE — PROGRESS NOTE BEHAVIORAL HEALTH - ABNORMAL MOVEMENTS
No abnormal movements

## 2018-10-27 NOTE — PROGRESS NOTE BEHAVIORAL HEALTH - NSBHADMITIPBHPROVIDER_PSY_A_CORE
yes

## 2018-10-27 NOTE — PROGRESS NOTE BEHAVIORAL HEALTH - BODY HABITUS
Average build/Well nourished
Well nourished/Average build
Average build
Average build/Well nourished
Average build
Well nourished/Average build
Average build
Average build/Well nourished
Average build
Well nourished/Average build
Average build/Well nourished
Well nourished/Average build
Average build
Average build/Well nourished
Well nourished/Average build
Average build
Average build/Well nourished
Average build/Well nourished
Average build
Well nourished/Average build

## 2018-10-27 NOTE — PROGRESS NOTE BEHAVIORAL HEALTH - NS ED [facility] MSE SPEECH VOLUME
Normal

## 2018-10-27 NOTE — PROGRESS NOTE BEHAVIORAL HEALTH - NSBHADMITDANGERSELF_PSY_A_CORE
pt is acutely psychotic
unable to care for self
pt is acutely psychotic
unable to care for self
pt is acutely psychotic
unable to care for self
suicidal behavior/unable to care for self
unable to care for self
unable to care for self
pt is acutely psychotic

## 2018-10-27 NOTE — PROGRESS NOTE BEHAVIORAL HEALTH - NSBHFUPTYPE_PSY_A_CORE
Inpatient

## 2018-10-27 NOTE — PROGRESS NOTE BEHAVIORAL HEALTH - GROOMING
Fair

## 2018-10-27 NOTE — PROGRESS NOTE BEHAVIORAL HEALTH - NS ED BHA MSE GENERAL APPEARANCE
Well developed

## 2018-10-27 NOTE — PROGRESS NOTE BEHAVIORAL HEALTH - SECONDARY DX1
Cocaine use disorder, moderate, in sustained remission, in controlled environment
HIV disease
Cocaine use disorder, moderate, in sustained remission, in controlled environment

## 2018-10-27 NOTE — PROGRESS NOTE BEHAVIORAL HEALTH - NSBHADMITIPOBSFT_PSY_A_CORE
unpredictable behavior
unpredictable behavior
as noted
unpredictable behavior
unpredictable behavior
per nursing
unpredictable behavior
unpredictable behavior
due to previously exhibited behaviors
unpredictable behavior
as noted
prior behaviors
-
due to psychosis and behaviors exhibited
per nursing
unpredictable behavior
per nursing
unpredictable behavior
per nursing
per nursing
unpredictable behavior

## 2018-10-27 NOTE — PROGRESS NOTE BEHAVIORAL HEALTH - NSBHPTASSESSDT_PSY_A_CORE
01-Oct-2018 10:10
02-Oct-2018 10:16
03-Oct-2018 10:46
12-Oct-2018 13:12
12-Oct-2018 13:12
16-Oct-2018 13:08
17-Oct-2018 13:43
18-Oct-2018 11:10
19-Oct-2018 06:24
19-Oct-2018 13:33
20-Oct-2018 10:47
22-Oct-2018 11:51
23-Oct-2018 09:41
26-Oct-2018 11:24
26-Sep-2018 10:06
27-Oct-2018 10:47
28-Sep-2018 10:54
29-Sep-2018 11:02
09-Oct-2018 10:57
25-Oct-2018 09:38
08-Oct-2018 13:40
14-Oct-2018 17:09
15-Oct-2018 14:46
07-Oct-2018 10:41
11-Oct-2018 13:25
27-Sep-2018 11:30
20-Oct-2018 10:47
04-Oct-2018 10:54
10-Oct-2018 14:39
24-Oct-2018 12:39
05-Oct-2018 11:04
25-Sep-2018 11:23

## 2018-10-27 NOTE — PROGRESS NOTE BEHAVIORAL HEALTH - INSIGHT (REGARDING PSYCHIATRIC ILLNESS)
Poor

## 2018-10-27 NOTE — PROGRESS NOTE BEHAVIORAL HEALTH - PERCEPTIONS
No abnormalities
Auditory hallucinations
No abnormalities
Auditory hallucinations
No abnormalities
No abnormalities
Auditory hallucinations
Auditory hallucinations
No abnormalities
Auditory hallucinations
No abnormalities
No abnormalities

## 2018-10-27 NOTE — PROGRESS NOTE BEHAVIORAL HEALTH - GAIT / STATION
Normal gait / station

## 2018-10-27 NOTE — PROGRESS NOTE BEHAVIORAL HEALTH - NSBHADMITIPOBS_PSY_A_CORE
Constant observation
Routine observation
Constant observation
Enhanced supervision
Constant observation
Routine observation
Routine observation
Constant observation
Routine observation
Constant observation
Routine observation
Constant observation
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
## 2018-10-27 NOTE — PROGRESS NOTE BEHAVIORAL HEALTH - NS ED [facility] MED ROS PSYCHIATRIC
See HPI

## 2018-10-27 NOTE — PROGRESS NOTE BEHAVIORAL HEALTH - AXIS III
HIV, DVT, atrial fibrillation, DM2, HTN, HLD

## 2018-10-27 NOTE — PROGRESS NOTE BEHAVIORAL HEALTH - AFFECT CONGRUENCE
Congruent

## 2018-10-27 NOTE — PROGRESS NOTE BEHAVIORAL HEALTH - ESTIMATED INTELLIGENCE
Average

## 2018-10-27 NOTE — PROGRESS NOTE BEHAVIORAL HEALTH - NSBHADMITIPDSM_PSY_A_CORE
see above for Axis I, II, III

## 2018-10-27 NOTE — PROGRESS NOTE BEHAVIORAL HEALTH - NS ED BHA AXIS I PRIMARY CODE FT
F25.9
F25.0
F25.9
F25.0
F25.9
F25.9
F25.0
F25.0
F25.9
F25.0
F25.9
F25.0
F25.9

## 2018-10-27 NOTE — PROGRESS NOTE BEHAVIORAL HEALTH - PRIMARY DX
Schizoaffective disorder
Schizoaffective disorder, bipolar type
Schizoaffective disorder
Schizoaffective disorder, bipolar type
Schizoaffective disorder
Schizoaffective disorder
Schizoaffective disorder, bipolar type
Schizoaffective disorder
Schizoaffective disorder, bipolar type
Schizoaffective disorder
Schizoaffective disorder, bipolar type
Schizoaffective disorder
Schizoaffective disorder, bipolar type
Schizoaffective disorder
Schizoaffective disorder

## 2018-10-27 NOTE — PROGRESS NOTE BEHAVIORAL HEALTH - RELATEDNESS
Poor
Poor
Fair
Poor
Fair
Poor
Fair
Poor
Fair
Poor
Fair
Fair
Poor
Poor
Fair
Poor
Fair
Poor
Fair
Poor
Fair
Poor

## 2018-10-27 NOTE — PROGRESS NOTE BEHAVIORAL HEALTH - NSBHADMITIPBHPROVFT_PSY_A_CORE
spoke to Dr. Elly Robles
as noted
spoke to Dr. Elly Robles
as noted
outpatient provider
as noted
spoke to Dr. Elly Robles

## 2018-10-27 NOTE — PROGRESS NOTE BEHAVIORAL HEALTH - BEHAVIOR
Cooperative

## 2018-10-27 NOTE — PROGRESS NOTE BEHAVIORAL HEALTH - NSBHCONSORIP_PSY_A_CORE
Inpatient Admission...
Consult...
Inpatient Admission...

## 2018-10-27 NOTE — PROGRESS NOTE BEHAVIORAL HEALTH - AFFECT RANGE
Full

## 2018-10-27 NOTE — PROGRESS NOTE BEHAVIORAL HEALTH - NSBHLEGALSTATUS_PSY_A_CORE
9.39 (Emergency)
9.27 (2PC)
9.39 (Emergency)
9.27 (2PC)
9.39 (Emergency)
9.27 (2PC)
9.27 (2PC)
9.13 (Voluntary)
9.13 (Voluntary)
9.39 (Emergency)

## 2018-10-27 NOTE — PROGRESS NOTE BEHAVIORAL HEALTH - THOUGHT CONTENT
Delusions
Delusions/Preoccupations
Preoccupations/Homicidality
Delusions/Preoccupations/Other
Preoccupations/Delusions
Preoccupations
Delusions
Delusions
Homicidality/Preoccupations
Delusions
Delusions/Other
Preoccupations/Homicidality
Delusions/Other
Preoccupations/Homicidality
Unremarkable
Homicidality/Preoccupations
Delusions
Delusions/Other
Delusions/Other
Delusions/Other/Ideas of reference
Delusions/Preoccupations
Homicidality/Preoccupations
Other/Delusions
Preoccupations/Other/Delusions
Delusions/Preoccupations
Preoccupations
Delusions
Delusions/Other
Unremarkable
Delusions/Preoccupations
Preoccupations
Unremarkable

## 2018-10-27 NOTE — PROGRESS NOTE BEHAVIORAL HEALTH - NSBHFUPINTERVALHXFT_PSY_A_CORE
fixated on using the phone and has to be constantly reminded that he cannot be making phone calls 24/7. Patient endorses feeling safe and denies any reported events. Patient is compliant with medications and has no other complaints. Patient denies suicidal thoughts or ideations.

## 2018-10-27 NOTE — PROGRESS NOTE BEHAVIORAL HEALTH - NSBHADMITIPREASON_PSY_A_CORE
Danger to self; mental illness expected to respond to inpatient care

## 2018-10-27 NOTE — PROGRESS NOTE BEHAVIORAL HEALTH - THOUGHT ASSOCIATIONS
Normal
Loose
Normal
Normal
Loose
Loose
Normal
Normal
Loose
Normal
Loose
Loose
Normal
Loose
Normal
Normal
Loose
Loose
Normal
Normal
Loose

## 2018-10-27 NOTE — PROGRESS NOTE BEHAVIORAL HEALTH - JUDGMENT (REGARDING EVERYDAY EVENTS)
Fair
Poor
Fair
Poor
Fair
Poor
Fair
Fair
Poor
Fair
Poor
Poor
Fair
Poor
Fair
Poor
Fair
Poor
Poor
Fair
Poor
Fair
Poor
Fair
Poor
Poor

## 2018-10-27 NOTE — PROGRESS NOTE BEHAVIORAL HEALTH - ORIENTED TO TIME
Yes

## 2018-10-27 NOTE — PROGRESS NOTE BEHAVIORAL HEALTH - NSBHADMITMEDEDU_PSY_A_CORE
no
yes...
no
yes...
no
yes...
no

## 2018-10-27 NOTE — PROGRESS NOTE BEHAVIORAL HEALTH - NSBHFUPSUICINTERVAL_PSY_A_CORE
none known

## 2018-10-27 NOTE — PROGRESS NOTE BEHAVIORAL HEALTH - MUSCLE TONE / STRENGTH
Normal muscle tone/strength
Other
Normal muscle tone/strength

## 2018-10-27 NOTE — PROGRESS NOTE BEHAVIORAL HEALTH - NSBHATTESTSEENBY_PSY_A_CORE
attending Psychiatrist without NP/Trainee
Attending Psychiatrist supervising NP/Trainee, meeting pt...
attending Psychiatrist without NP/Trainee
Attending Psychiatrist supervising NP/Trainee, meeting pt...

## 2018-10-27 NOTE — PROGRESS NOTE BEHAVIORAL HEALTH - THOUGHT PROCESS
Disorganized/Illogical/Impaired reasoning/Tangential/Overinclusive
Impaired reasoning/Tangential/Overinclusive/Disorganized/Illogical
Tangential/Disorganized/Impaired reasoning/Overinclusive
Overinclusive/Disorganized/Illogical/Impaired reasoning/Tangential
Impaired reasoning/Overinclusive/Tangential/Illogical/Disorganized
Disorganized/Tangential/Impaired reasoning
Impaired reasoning/Illogical/Disorganized/Tangential/Overinclusive
Tangential/Overinclusive/Illogical/Impaired reasoning/Disorganized
Overinclusive/Disorganized/Tangential/Impaired reasoning
Illogical/Overinclusive/Tangential
Overinclusive/Illogical/Impaired reasoning/Disorganized/Tangential
Overinclusive/Impaired reasoning/Tangential/Disorganized
Impaired reasoning/Overinclusive/Tangential/Disorganized
Overinclusive/Tangential
Impaired reasoning/Disorganized/Tangential
Tangential/Disorganized/Overinclusive/Impaired reasoning
Disorganized/Tangential/Overinclusive/Illogical/Impaired reasoning
Illogical/Disorganized/Tangential/Impaired reasoning/Overinclusive
Overinclusive/Tangential
Disorganized/Overinclusive/Illogical/Impaired reasoning/Tangential
Impaired reasoning/Overinclusive/Disorganized/Tangential/Illogical
Overinclusive/Tangential/Disorganized/Impaired reasoning
Tangential/Impaired reasoning/Overinclusive/Illogical/Disorganized
Tangential/Overinclusive/Illogical/Disorganized/Impaired reasoning
Overinclusive/Tangential/Impaired reasoning/Illogical/Disorganized
Tangential/Impaired reasoning/Disorganized
Illogical/Impaired reasoning/Tangential/Overinclusive/Disorganized
Overinclusive/Tangential
Tangential/Impaired reasoning/Disorganized
Tangential/Overinclusive/Illogical/Impaired reasoning/Disorganized
Tangential/Impaired reasoning/Disorganized
Tangential/Disorganized/Impaired reasoning

## 2018-10-28 RX ADMIN — HALOPERIDOL DECANOATE 10 MILLIGRAM(S): 100 INJECTION INTRAMUSCULAR at 20:21

## 2018-10-28 RX ADMIN — HALOPERIDOL DECANOATE 10 MILLIGRAM(S): 100 INJECTION INTRAMUSCULAR at 08:41

## 2018-10-28 RX ADMIN — DIVALPROEX SODIUM 500 MILLIGRAM(S): 500 TABLET, DELAYED RELEASE ORAL at 08:39

## 2018-10-28 RX ADMIN — DIVALPROEX SODIUM 1000 MILLIGRAM(S): 500 TABLET, DELAYED RELEASE ORAL at 20:21

## 2018-10-28 RX ADMIN — Medication 650 MILLIGRAM(S): at 13:13

## 2018-10-28 RX ADMIN — Medication 81 MILLIGRAM(S): at 08:39

## 2018-10-28 RX ADMIN — ATORVASTATIN CALCIUM 10 MILLIGRAM(S): 80 TABLET, FILM COATED ORAL at 20:21

## 2018-10-28 RX ADMIN — Medication 5 MILLIGRAM(S): at 20:21

## 2018-10-28 RX ADMIN — Medication 650 MILLIGRAM(S): at 13:39

## 2018-10-28 RX ADMIN — Medication 5 MILLIGRAM(S): at 13:16

## 2018-10-28 RX ADMIN — GABAPENTIN 600 MILLIGRAM(S): 400 CAPSULE ORAL at 13:09

## 2018-10-28 RX ADMIN — ARIPIPRAZOLE 20 MILLIGRAM(S): 15 TABLET ORAL at 20:21

## 2018-10-28 RX ADMIN — METFORMIN HYDROCHLORIDE 500 MILLIGRAM(S): 850 TABLET ORAL at 20:20

## 2018-10-28 RX ADMIN — METFORMIN HYDROCHLORIDE 500 MILLIGRAM(S): 850 TABLET ORAL at 08:41

## 2018-10-28 RX ADMIN — TAMSULOSIN HYDROCHLORIDE 0.4 MILLIGRAM(S): 0.4 CAPSULE ORAL at 20:21

## 2018-10-28 RX ADMIN — CLOPIDOGREL BISULFATE 75 MILLIGRAM(S): 75 TABLET, FILM COATED ORAL at 08:40

## 2018-10-28 RX ADMIN — GABAPENTIN 600 MILLIGRAM(S): 400 CAPSULE ORAL at 20:21

## 2018-10-28 RX ADMIN — ELVITEGRAVIR, COBICISTAT, EMTRICITABINE, AND TENOFOVIR ALAFENAMIDE 1 TABLET(S): 150; 150; 200; 10 TABLET ORAL at 08:43

## 2018-10-28 RX ADMIN — GABAPENTIN 600 MILLIGRAM(S): 400 CAPSULE ORAL at 08:40

## 2018-10-28 RX ADMIN — Medication 5 MILLIGRAM(S): at 08:42

## 2018-10-28 NOTE — PROGRESS NOTE ADULT - PROBLEM SELECTOR PLAN 3
continue present treatment as per psych plan as reviewed  Medically stable with no new changes in treatment  will continue to monitor medical status while being treated on psych
bp astabel on current meds
bp brittanie on cortney de souza tx
bp stabel on tx as oreded
bp stasble on tx as oreded
cont all meds as oreded
continue present treatment as per psych plan as reviewed  Medically stable with no new changes in treatment  will continue to monitor medical status while being treated on psych
continue present treatment as per psych plan as reviewed  Medically stable with no new changes in treatment  will continue to monitor medical status while being treated on psych
[place] meds as jesika
cotn current tx stable
mikael bp on current meds  no changes in meds
yessenia ludwig and brittanie  with good diet   will check raine maloney

## 2018-10-28 NOTE — PROGRESS NOTE ADULT - PROBLEM SELECTOR PROBLEM 1
Hypertension, unspecified type
Bruising
HIV (human immunodeficiency virus infection)
Hypertension, unspecified type
Hypertension, unspecified type
Paranoid schizophrenia
Paranoid schizophrenia
Schizoaffective disorder, bipolar type
Type 2 diabetes mellitus without complication, unspecified whether long term insulin use

## 2018-10-28 NOTE — PROGRESS NOTE ADULT - PROBLEM SELECTOR PROBLEM 2
Type 2 diabetes mellitus without complication, unspecified whether long term insulin use
Bruising
HIV (human immunodeficiency virus infection)
HIV (human immunodeficiency virus infection)
Hypertension, unspecified type
Schizoaffective disorder, bipolar type
Type 2 diabetes mellitus without complication, unspecified whether long term insulin use

## 2018-10-28 NOTE — PROGRESS NOTE ADULT - PROBLEM SELECTOR PLAN 2
diabetes also stabel on curren t x  no new changs
cont current tx
cont tx as per ID
continue present treatment as per psych plan as reviewed  Medically stable with no new changes in treatment  will continue to monitor medical status while being treated on psych
cotn meds on metfroim good bs as ntoed
jameson calderon
jameson stable on current tx
mikael on cortney de souza x
on diet tx only and bs nnormla
resolved
stabel bp on currentt x
yessenia hughes

## 2018-10-28 NOTE — PROGRESS NOTE ADULT - ASSESSMENT
medically stable with no acute issues
medically stable    new bruises noted on legs and arm   etiolgy possibly from fall or trauma from pressure
medically stable with no acute issues
medically stable with no acute issues   bruising areas note adn healing
medically stable with no acute issues   diebets under good contol

## 2018-10-28 NOTE — PROGRESS NOTE ADULT - PROBLEM SELECTOR PLAN 1
yessenia current  tx with stabel bp
brittanie barr ttx
continue present treatment as per psych plan as reviewed  Medically stable with no new changes in treatment  will continue to monitor medical status while being treated on psych
cotn all presnt tx
healign local skin care
jameson casas to monitor
see dr beal note and stable on meds

## 2018-10-29 VITALS
TEMPERATURE: 97 F | HEART RATE: 85 BPM | RESPIRATION RATE: 17 BRPM | DIASTOLIC BLOOD PRESSURE: 71 MMHG | SYSTOLIC BLOOD PRESSURE: 142 MMHG

## 2018-10-29 RX ORDER — VALACYCLOVIR 500 MG/1
1 TABLET, FILM COATED ORAL
Qty: 0 | Refills: 0 | COMMUNITY

## 2018-10-29 RX ORDER — ARIPIPRAZOLE 15 MG/1
1 TABLET ORAL
Qty: 0 | Refills: 0 | COMMUNITY

## 2018-10-29 RX ORDER — METFORMIN HYDROCHLORIDE 850 MG/1
1 TABLET ORAL
Qty: 0 | Refills: 0 | COMMUNITY

## 2018-10-29 RX ORDER — IMIQUIMOD 50 MG/G
1 CREAM TOPICAL
Qty: 0 | Refills: 0 | COMMUNITY

## 2018-10-29 RX ORDER — DIVALPROEX SODIUM 500 MG/1
1 TABLET, DELAYED RELEASE ORAL
Qty: 30 | Refills: 0
Start: 2018-10-29 | End: 2018-11-27

## 2018-10-29 RX ORDER — LEVOCETIRIZINE DIHYDROCHLORIDE 0.5 MG/ML
1 SOLUTION ORAL
Qty: 0 | Refills: 0 | COMMUNITY

## 2018-10-29 RX ORDER — DIAZEPAM 5 MG
1 TABLET ORAL
Qty: 0 | Refills: 0 | COMMUNITY

## 2018-10-29 RX ORDER — MOMETASONE FUROATE AND FORMOTEROL FUMARATE DIHYDRATE 200; 5 UG/1; UG/1
2 AEROSOL RESPIRATORY (INHALATION)
Qty: 0 | Refills: 0 | COMMUNITY

## 2018-10-29 RX ORDER — SALICYLIC ACID 0.5 %
1 CLEANSER (GRAM) TOPICAL
Qty: 0 | Refills: 0 | COMMUNITY

## 2018-10-29 RX ORDER — GABAPENTIN 400 MG/1
1 CAPSULE ORAL
Qty: 0 | Refills: 0 | COMMUNITY

## 2018-10-29 RX ORDER — DOCUSATE SODIUM 100 MG
1 CAPSULE ORAL
Qty: 0 | Refills: 0 | COMMUNITY

## 2018-10-29 RX ORDER — OLOPATADINE HYDROCHLORIDE 1 MG/ML
1 SOLUTION/ DROPS OPHTHALMIC
Qty: 0 | Refills: 0 | COMMUNITY

## 2018-10-29 RX ORDER — DICLOFENAC SODIUM 30 MG/G
1 GEL TOPICAL
Qty: 0 | Refills: 0 | COMMUNITY

## 2018-10-29 RX ORDER — METOPROLOL TARTRATE 50 MG
1 TABLET ORAL
Qty: 0 | Refills: 0 | COMMUNITY

## 2018-10-29 RX ORDER — DILTIAZEM HCL 120 MG
1 CAPSULE, EXT RELEASE 24 HR ORAL
Qty: 0 | Refills: 0 | COMMUNITY

## 2018-10-29 RX ORDER — DIVALPROEX SODIUM 500 MG/1
2 TABLET, DELAYED RELEASE ORAL
Qty: 60 | Refills: 0
Start: 2018-10-29 | End: 2018-11-27

## 2018-10-29 RX ORDER — OXYBUTYNIN CHLORIDE 5 MG
1 TABLET ORAL
Qty: 0 | Refills: 0 | COMMUNITY

## 2018-10-29 RX ORDER — DIVALPROEX SODIUM 500 MG/1
1 TABLET, DELAYED RELEASE ORAL
Qty: 0 | Refills: 0 | COMMUNITY

## 2018-10-29 RX ORDER — DIVALPROEX SODIUM 500 MG/1
3 TABLET, DELAYED RELEASE ORAL
Qty: 0 | Refills: 0 | COMMUNITY

## 2018-10-29 RX ORDER — FLUTICASONE PROPIONATE 50 MCG
2 SPRAY, SUSPENSION NASAL
Qty: 0 | Refills: 0 | COMMUNITY

## 2018-10-29 RX ORDER — HALOPERIDOL DECANOATE 100 MG/ML
1 INJECTION INTRAMUSCULAR
Qty: 0 | Refills: 0 | COMMUNITY
Start: 2018-10-29

## 2018-10-29 RX ORDER — OMEPRAZOLE 10 MG/1
1 CAPSULE, DELAYED RELEASE ORAL
Qty: 0 | Refills: 0 | COMMUNITY

## 2018-10-29 RX ORDER — TRAZODONE HCL 50 MG
1 TABLET ORAL
Qty: 0 | Refills: 0 | COMMUNITY

## 2018-10-29 RX ORDER — HALOPERIDOL DECANOATE 100 MG/ML
1 INJECTION INTRAMUSCULAR
Qty: 60 | Refills: 0
Start: 2018-10-29 | End: 2018-11-27

## 2018-10-29 RX ORDER — ALBUTEROL 90 UG/1
1 AEROSOL, METERED ORAL
Qty: 0 | Refills: 0 | COMMUNITY

## 2018-10-29 RX ORDER — TIOTROPIUM BROMIDE 18 UG/1
2 CAPSULE ORAL; RESPIRATORY (INHALATION)
Qty: 0 | Refills: 0 | COMMUNITY

## 2018-10-29 RX ORDER — TAMSULOSIN HYDROCHLORIDE 0.4 MG/1
1 CAPSULE ORAL
Qty: 0 | Refills: 0 | COMMUNITY

## 2018-10-29 RX ORDER — ATORVASTATIN CALCIUM 80 MG/1
1 TABLET, FILM COATED ORAL
Qty: 0 | Refills: 0 | COMMUNITY

## 2018-10-29 RX ORDER — IPRATROPIUM BROMIDE 0.2 MG/ML
2 SOLUTION, NON-ORAL INHALATION
Qty: 0 | Refills: 0 | COMMUNITY

## 2018-10-29 RX ORDER — ALBUTEROL 90 UG/1
1 AEROSOL, METERED ORAL
Qty: 0 | Refills: 0 | DISCHARGE
Start: 2018-10-29

## 2018-10-29 RX ORDER — HYDROCORTISONE 1 %
1 OINTMENT (GRAM) TOPICAL
Qty: 0 | Refills: 0 | COMMUNITY

## 2018-10-29 RX ORDER — CLOPIDOGREL BISULFATE 75 MG/1
1 TABLET, FILM COATED ORAL
Qty: 0 | Refills: 0 | COMMUNITY

## 2018-10-29 RX ORDER — SODIUM CHLORIDE 0.65 %
2 AEROSOL, SPRAY (ML) NASAL
Qty: 0 | Refills: 0 | COMMUNITY

## 2018-10-29 RX ORDER — ELVITEGRAVIR, COBICISTAT, EMTRICITABINE, AND TENOFOVIR ALAFENAMIDE 150; 150; 200; 10 MG/1; MG/1; MG/1; MG/1
1 TABLET ORAL
Qty: 0 | Refills: 0 | COMMUNITY

## 2018-10-29 RX ORDER — ARIPIPRAZOLE 15 MG/1
1 TABLET ORAL
Qty: 30 | Refills: 0
Start: 2018-10-29 | End: 2018-11-27

## 2018-10-29 RX ORDER — OXYCODONE HYDROCHLORIDE 5 MG/1
1 TABLET ORAL
Qty: 0 | Refills: 0 | COMMUNITY

## 2018-10-29 RX ADMIN — GABAPENTIN 600 MILLIGRAM(S): 400 CAPSULE ORAL at 12:41

## 2018-10-29 RX ADMIN — HALOPERIDOL DECANOATE 10 MILLIGRAM(S): 100 INJECTION INTRAMUSCULAR at 08:10

## 2018-10-29 RX ADMIN — DIVALPROEX SODIUM 500 MILLIGRAM(S): 500 TABLET, DELAYED RELEASE ORAL at 08:10

## 2018-10-29 RX ADMIN — Medication 81 MILLIGRAM(S): at 08:09

## 2018-10-29 RX ADMIN — ELVITEGRAVIR, COBICISTAT, EMTRICITABINE, AND TENOFOVIR ALAFENAMIDE 1 TABLET(S): 150; 150; 200; 10 TABLET ORAL at 08:11

## 2018-10-29 RX ADMIN — METFORMIN HYDROCHLORIDE 500 MILLIGRAM(S): 850 TABLET ORAL at 08:10

## 2018-10-29 RX ADMIN — CLOPIDOGREL BISULFATE 75 MILLIGRAM(S): 75 TABLET, FILM COATED ORAL at 08:10

## 2018-10-29 RX ADMIN — GABAPENTIN 600 MILLIGRAM(S): 400 CAPSULE ORAL at 08:09

## 2018-10-29 RX ADMIN — Medication 5 MILLIGRAM(S): at 08:09

## 2018-10-29 NOTE — DISCHARGE NOTE BEHAVIORAL HEALTH - NSBHDCSIGEVENTSFT_PSY_A_CORE
On 10/2/18,  pt attempted to cut his wrist with a broken plastic cup. Pt was seen in bed hold by 2 staff members. Pt stated that a staff member on the unit wanted to kill him, and he decided to kill himself first. Pt appeared to be psychotic with persecutory delusions. Pt was placed in seclusion and given IM injections of Ativan 2mg and Haldol 5mg due to acute psychosis with self-injurious behavior.    On 10/11, pt tied a sheet around his neck to attempt strangulation. He reported auditory command hallucinations telling him to kill himself.     On 10/13, Patient was witnessed banging his head on the glass of the wall fire extinguisher enclosure.

## 2018-10-29 NOTE — DISCHARGE NOTE BEHAVIORAL HEALTH - NSBHDCMEDICALFT_PSY_A_CORE
HIV- Genvoya 150mg qd, HLD-lipitor 10mg hs, DM2 : metoformin 500mg bid,  DVT prophylaxis-aspirin 81mg, plavix, oxybutinin 10mg qd, Tamulosin 0.4mg qd, gabapentin 600mg tid.

## 2018-10-29 NOTE — CHART NOTE - NSCHARTNOTEFT_GEN_A_CORE
Social Work Discharge Note:    Patient went to court today for retention.   advised patient was to be discharged as determined by the court.      Prior to admission patient was in treatment with psychiatrist Dr. Robles at [facility].  This worker attempted to schedule an appointment for patient.     Dr. Robles phone [phone] - voicemail left   Clinic phone - [phone] Spoke with Jessica who advised she would need to speak with psychiatrist prior to scheduling an appointment.   Service phone [phone] - voicemail left.     As of this writing no return call received with an appointment.

## 2018-10-29 NOTE — PROGRESS NOTE ADULT - REASON FOR ADMISSION
Schizoaffective disorder
Schizoaffective disorder
Schizophrenia

## 2018-10-29 NOTE — DISCHARGE NOTE BEHAVIORAL HEALTH - PAST PSYCHIATRIC HISTORY
1 prior [facility] admission, currently in outpatient tx with Dr. Robles [facility]

## 2018-10-29 NOTE — DISCHARGE NOTE BEHAVIORAL HEALTH - MEDICATION SUMMARY - MEDICATIONS TO TAKE
I will START or STAY ON the medications listed below when I get home from the hospital:    Aspir 81 oral delayed release tablet  -- 1 tab(s) by mouth once a day  -- Indication: For Home medication    tamsulosin 0.4 mg oral capsule  -- 1 cap(s) by mouth once a day  -- Indication: For Home medication    gabapentin 300 mg oral tablet  -- 2 tab(s) by mouth 3 times a day  -- Indication: For Home medication    divalproex sodium 500 mg oral delayed release tablet  -- 2 tab(s) by mouth once a day (at bedtime)  -- Indication: For Schizoaffective disorder    divalproex sodium 500 mg oral delayed release tablet  -- 1 tab(s) by mouth once a day   -- Indication: For Schizoaffective disorder    diazePAM 5 mg oral tablet  -- 1 tab(s) by mouth 3 times a day, As Needed- Pt reports he has supply at home  -- Indication: For Home medication    metFORMIN 1000 mg oral tablet  -- 1 tab(s) by mouth 2 times a day  -- Indication: For Home medication    atorvastatin 10 mg oral tablet  -- 1 tab(s) by mouth once a day  -- Indication: For Home medication    Plavix 75 mg oral tablet  -- 1 tab(s) by mouth once a day  -- Indication: For Home medication    haloperidol 10 mg oral tablet  -- 1 tab(s) by mouth 2 times a day  -- Indication: For Schizoaffective disorder    ARIPiprazole 20 mg oral tablet  -- 1 tab(s) by mouth once a day (at bedtime)  -- Indication: For Schizoaffective disorder    Genvoya oral tablet  -- 1 tab(s) by mouth once a day  -- Indication: For Home medication    albuterol 90 mcg/inh inhalation aerosol  -- 1 puff(s) inhaled every 4 hours, As needed, Shortness of Breath and/or Wheezing  -- Indication: For Home medication    Ditropan XL 10 mg/24 hr oral tablet, extended release  -- 1 tab(s) by mouth once a day  -- Indication: For Home medication

## 2018-10-29 NOTE — DISCHARGE NOTE BEHAVIORAL HEALTH - HPI (INCLUDE ILLNESS QUALITY, SEVERITY, DURATION, TIMING, CONTEXT, MODIFYING FACTORS, ASSOCIATED SIGNS AND SYMPTOMS)
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 Robles [facility]; remote hx of attempted self strangulation; PMH HIV, DVT, atrial fibrillation, DM2, HTN, HLD BIB EMS after he activated 911 [facility] at the [address] as he was afraid that family members are coming to kill him.    Hospital course:     Patient was started on his home psychiatric medications of Abilify 10mg qd, diazepam 5mg tid, Depakote ER 750mg qhs, trazodone 50mg prn QHS and restarted on his home medication: HIV- Genvoya 150mg qd, HLD-lipitor 10mg hs, DM2 : metoformin 500mg bid, HTN metoprolol 25mg qd, DVT prophylaxis-aspirin 81mg, plavix, oxybutinin 10mg qd, Tamulosin 0.4mg qd, gabapentin 600mg tid.     Patient became increasingly paranoid during week 2 of his hospital stay. He was paranoid about "staff members trying to kill him/stab him and poison his food" and his diet was altered to sealed Kosher meals. He was kept on a 1:1 and then a 2:1 for safety. Please see below for significant behavioral health events including pt's attempt of self strangulation in response to command hallucinations.     Pt's abilify was increased to 20mg QHS and his depakote was increased to 450mg Qdaily and 1000mg QHS. Pt was started on haldol and titrated up to 10mg BID. 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 Robles [facility]; remote hx of attempted self strangulation; PMH HIV, DVT, atrial fibrillation, DM2, HTN, HLD BIB EMS after he activated 911 while at the [facility] [address] as he was afraid that family members are coming to kill him.    Hospital course:     Patient was started on his home psychiatric medications of Abilify 10mg qd, diazepam 5mg tid, Depakote ER 750mg qhs, trazodone 50mg prn QHS and restarted on his home medication: HIV- Genvoya 150mg qd, HLD-lipitor 10mg hs, DM2 : metoformin 500mg bid, HTN metoprolol 25mg qd, DVT prophylaxis-aspirin 81mg, plavix, oxybutinin 10mg qd, Tamulosin 0.4mg qd, gabapentin 600mg tid.     Patient became increasingly paranoid during week 2 of his hospital stay. He was paranoid about "staff members trying to kill him/stab him and poison his food" and his diet was altered to sealed Kosher meals. He was kept on a 1:1 and then a 2:1 for safety. Please see below for significant behavioral health events including pt's attempt of self strangulation in response to command hallucinations.     Pt's abilify was increased to 20mg QHS and his depakote was increased to 450mg Qdaily and 1000mg QHS. Pt was started on haldol and titrated up to 10mg BID.    On day of discharge, patient was seen in court and decision made my  for discharge. Please see Dr. Hudson'z note for details. On day of discharge, patient denies any thoughts of wanting to hurt himself or others. He denies any auditory or visual hallucinations. He denies any feelings of paranoia. He denies any other acute symptoms or complaints. Patient is clinically stable for discharge.

## 2018-10-29 NOTE — PROGRESS NOTE ADULT - SUBJECTIVE AND OBJECTIVE BOX
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 three times a day  haloperidol     Tablet 5 milliGRAM(s) Oral every 6 hours PRN  haloperidol     Tablet 10 milliGRAM(s) Oral once  haloperidol     Tablet 7.5 milliGRAM(s) Oral every 12 hours  hydrocortisone 0.5% Ointment 1 Application(s) Topical daily  hydrOXYzine hydrochloride 50 milliGRAM(s) Oral every 6 hours PRN  ipratropium 17 MICROgram(s) HFA Inhaler 1 Puff(s) Inhalation every 6 hours  ketotifen 0.025% Ophthalmic Solution 1 Drop(s) Both EYES at bedtime  lidocaine 1% (Preservative-free) Injectable 1 milliLiter(s) Local Injection once  metFORMIN 500 milliGRAM(s) Oral two times a day  montelukast 10 milliGRAM(s) Oral daily  oxybutynin 5 milliGRAM(s) Oral two times a day  pantoprazole    Tablet 40 milliGRAM(s) Oral before breakfast  silver sulfADIAZINE 1% Cream 1 Application(s) Topical two times a day  sodium chloride 0.65% Nasal 1 Spray(s) Both Nostrils two times a day PRN  tamsulosin 0.4 milliGRAM(s) Oral at bedtime  tenofovir alafenamide 10 mG/rujkyxnzgteo673 mG/cobicistat 150 mG/emtricitabine 200 mG (GENVOYA) 1 Tablet(s) Oral daily  traZODone 50 milliGRAM(s) Oral at bedtime PRN  valACYclovir 1000 milliGRAM(s) Oral daily      T(C): 35.9 (10-16-18 @ 06:39), Max: 35.9 (10-16-18 @ 06:39)  HR: 96 (10-16-18 @ 06:39) (63 - 96)  BP: 151/83 (10-16-18 @ 06:39) (113/67 - 151/83)  RR: 18 (10-16-18 @ 06:39) (16 - 18)  SpO2: --    PE;  general:  no acute cahgnes in nad    Lungs:    Heart:    EXT:    Neuro:   no deficits                        CAPILLARY BLOOD GLUCOSE
pt stable alert in NAD  no new complaints    PARANOIA PSYCHOSIS  ^PARANOIA PSYCHOSIS  No pertinent family history in first degree relatives  No pertinent family history in first degree relatives  Handoff  Afib  HLD (hyperlipidemia)  HTN (hypertension)  DM (diabetes mellitus)  HIV (human immunodeficiency virus infection)  Deep vein thrombosis of both lower extremities  Paranoid schizophrenia  PAF (paroxysmal atrial fibrillation)  Allergy  Bipolar 1 disorder  DM (diabetes mellitus)  HIV disease  Hypertension, unspecified type  Schizoaffective disorder, bipolar type  HTN (hypertension)  Type 2 diabetes mellitus without complication, unspecified whether long term insulin use  Paranoid schizophrenia  Cocaine use disorder, moderate, in sustained remission, in controlled environment  Schizoaffective disorder  Type 2 diabetes mellitus without complication, without long-term current use of insulin  DM (diabetes mellitus)  HIV disease  Bipolar 1 disorder  Schizophrenia  No significant past surgical history  S/P laparotomy    HEALTH ISSUES - PROBLEM Dx:  Hypertension, unspecified type: Hypertension, unspecified type  Schizoaffective disorder, bipolar type  HTN (hypertension): HTN (hypertension)  Type 2 diabetes mellitus without complication, unspecified whether long term insulin use: Type 2 diabetes mellitus without complication, unspecified whether long term insulin use  Paranoid schizophrenia: Paranoid schizophrenia  Cocaine use disorder, moderate, in sustained remission, in controlled environment  Schizoaffective disorder  Type 2 diabetes mellitus without complication, without long-term current use of insulin: Type 2 diabetes mellitus without complication, without long-term current use of insulin  DM (diabetes mellitus): DM (diabetes mellitus)  HIV disease: HIV disease  Bipolar 1 disorder: Bipolar 1 disorder  Schizophrenia: Schizophrenia        PAST MEDICAL & SURGICAL HISTORY:  Afib  HLD (hyperlipidemia)  HTN (hypertension)  DM (diabetes mellitus)  HIV (human immunodeficiency virus infection)  Deep vein thrombosis of both lower extremities  Paranoid schizophrenia  PAF (paroxysmal atrial fibrillation)  Allergy  Bipolar 1 disorder  DM (diabetes mellitus)  HIV disease  No significant past surgical history  S/P laparotomy    No Known Allergies      FAMILY HISTORY:  No pertinent family history in first degree relatives  No pertinent family history in first degree relatives      acetaminophen   Tablet .. 650 milliGRAM(s) Oral every 6 hours PRN  ALBUTerol    90 MICROgram(s) HFA Inhaler 1 Puff(s) Inhalation every 4 hours PRN  ARIPiprazole 15 milliGRAM(s) Oral at bedtime  artificial tears (preservative free) Ophthalmic Solution 1 Drop(s) Both EYES daily  aspirin enteric coated 81 milliGRAM(s) Oral daily  atorvastatin 10 milliGRAM(s) Oral at bedtime  BACItracin   Ointment 1 Application(s) Topical daily  buDESOnide  80 MICROgram(s)/formoterol 4.5 MICROgram(s) Inhaler 2 Puff(s) Inhalation two times a day  cephalexin 500 milliGRAM(s) Oral every 12 hours  clopidogrel Tablet 75 milliGRAM(s) Oral daily  diazepam    Tablet 5 milliGRAM(s) Oral three times a day  diltiazem    milliGRAM(s) Oral daily  diVALproex  milliGRAM(s) Oral at bedtime  docusate sodium 100 milliGRAM(s) Oral two times a day  fluticasone propionate 50 MICROgram(s)/spray Nasal Spray 1 Spray(s) Both Nostrils daily  gabapentin 600 milliGRAM(s) Oral three times a day  haloperidol     Tablet 5 milliGRAM(s) Oral every 6 hours PRN  hydrocortisone 0.5% Ointment 1 Application(s) Topical daily  influenza   Vaccine 0.5 milliLiter(s) IntraMuscular once  ipratropium 17 MICROgram(s) HFA Inhaler 1 Puff(s) Inhalation every 6 hours  ketotifen 0.025% Ophthalmic Solution 1 Drop(s) Both EYES at bedtime  LORazepam     Tablet 2 milliGRAM(s) Oral every 6 hours PRN  metFORMIN 500 milliGRAM(s) Oral two times a day  montelukast 10 milliGRAM(s) Oral daily  oxybutynin 5 milliGRAM(s) Oral two times a day  pantoprazole    Tablet 40 milliGRAM(s) Oral before breakfast  silver sulfADIAZINE 1% Cream 1 Application(s) Topical two times a day  sodium chloride 0.65% Nasal 1 Spray(s) Both Nostrils two times a day PRN  tamsulosin 0.4 milliGRAM(s) Oral at bedtime  tenofovir alafenamide 10 mG/mfgrgoojrsfq643 mG/cobicistat 150 mG/emtricitabine 200 mG (GENVOYA) 1 Tablet(s) Oral daily  traZODone 50 milliGRAM(s) Oral at bedtime PRN  valACYclovir 1000 milliGRAM(s) Oral daily      T(C): 36.2 (10-03-18 @ 07:15), Max: 36.2 (10-03-18 @ 07:15)  HR: 78 (10-03-18 @ 07:15) (78 - 78)  BP: 136/64 (10-03-18 @ 07:15) (136/64 - 136/64)  RR: 18 (10-03-18 @ 07:15) (18 - 18)  SpO2: --    PE;  general:  no acute changes form previosu    Lungs:    Heart:    EXT:    Neuro:    alert no decitis                        CAPILLARY BLOOD GLUCOSE
pt stable alert in NAD  no new complaints    PARANOIA PSYCHOSIS  ^PARANOIA PSYCHOSIS  No pertinent family history in first degree relatives  No pertinent family history in first degree relatives  Handoff  Afib  HLD (hyperlipidemia)  HTN (hypertension)  DM (diabetes mellitus)  HIV (human immunodeficiency virus infection)  Deep vein thrombosis of both lower extremities  Paranoid schizophrenia  PAF (paroxysmal atrial fibrillation)  Allergy  Bipolar 1 disorder  DM (diabetes mellitus)  HIV disease  Hypertension, unspecified type  Schizoaffective disorder, bipolar type  HTN (hypertension)  Type 2 diabetes mellitus without complication, unspecified whether long term insulin use  Paranoid schizophrenia  Cocaine use disorder, moderate, in sustained remission, in controlled environment  Schizoaffective disorder  Type 2 diabetes mellitus without complication, without long-term current use of insulin  DM (diabetes mellitus)  HIV disease  Bipolar 1 disorder  Schizophrenia  No significant past surgical history  S/P laparotomy    HEALTH ISSUES - PROBLEM Dx:  Hypertension, unspecified type: Hypertension, unspecified type  Schizoaffective disorder, bipolar type  HTN (hypertension): HTN (hypertension)  Type 2 diabetes mellitus without complication, unspecified whether long term insulin use: Type 2 diabetes mellitus without complication, unspecified whether long term insulin use  Paranoid schizophrenia: Paranoid schizophrenia  Cocaine use disorder, moderate, in sustained remission, in controlled environment  Schizoaffective disorder  Type 2 diabetes mellitus without complication, without long-term current use of insulin: Type 2 diabetes mellitus without complication, without long-term current use of insulin  DM (diabetes mellitus): DM (diabetes mellitus)  HIV disease: HIV disease  Bipolar 1 disorder: Bipolar 1 disorder  Schizophrenia: Schizophrenia        PAST MEDICAL & SURGICAL HISTORY:  Afib  HLD (hyperlipidemia)  HTN (hypertension)  DM (diabetes mellitus)  HIV (human immunodeficiency virus infection)  Deep vein thrombosis of both lower extremities  Paranoid schizophrenia  PAF (paroxysmal atrial fibrillation)  Allergy  Bipolar 1 disorder  DM (diabetes mellitus)  HIV disease  No significant past surgical history  S/P laparotomy    No Known Allergies      FAMILY HISTORY:  No pertinent family history in first degree relatives  No pertinent family history in first degree relatives      acetaminophen   Tablet .. 650 milliGRAM(s) Oral every 6 hours PRN  ALBUTerol    90 MICROgram(s) HFA Inhaler 1 Puff(s) Inhalation every 4 hours PRN  ARIPiprazole 20 milliGRAM(s) Oral at bedtime  artificial  tears Solution 1 Drop(s) Both EYES every 4 hours PRN  artificial tears (preservative free) Ophthalmic Solution 1 Drop(s) Both EYES daily  aspirin enteric coated 81 milliGRAM(s) Oral daily  atorvastatin 10 milliGRAM(s) Oral at bedtime  BACItracin   Ointment 1 Application(s) Topical daily  bismuth subsalicylate Liquid 30 milliLiter(s) Oral every 6 hours PRN  buDESOnide  80 MICROgram(s)/formoterol 4.5 MICROgram(s) Inhaler 2 Puff(s) Inhalation two times a day  clopidogrel Tablet 75 milliGRAM(s) Oral daily  diazepam    Tablet 5 milliGRAM(s) Oral three times a day  diltiazem    milliGRAM(s) Oral daily  diphenhydrAMINE   Injectable 50 milliGRAM(s) IntraMuscular once PRN  diVALproex  milliGRAM(s) Oral <User Schedule>  diVALproex DR 1000 milliGRAM(s) Oral at bedtime  docusate sodium 100 milliGRAM(s) Oral two times a day  fluticasone propionate 50 MICROgram(s)/spray Nasal Spray 1 Spray(s) Both Nostrils daily  gabapentin 600 milliGRAM(s) Oral three times a day  haloperidol     Tablet 5 milliGRAM(s) Oral every 6 hours PRN  haloperidol     Tablet 5 milliGRAM(s) Oral daily  hydrocortisone 0.5% Ointment 1 Application(s) Topical daily  hydrOXYzine hydrochloride 50 milliGRAM(s) Oral every 6 hours PRN  ipratropium 17 MICROgram(s) HFA Inhaler 1 Puff(s) Inhalation every 6 hours  ketotifen 0.025% Ophthalmic Solution 1 Drop(s) Both EYES at bedtime  lidocaine 1% (Preservative-free) Injectable 1 milliLiter(s) Local Injection once  LORazepam   Injectable 2 milliGRAM(s) IntraMuscular once  metFORMIN 500 milliGRAM(s) Oral two times a day  montelukast 10 milliGRAM(s) Oral daily  oxybutynin 5 milliGRAM(s) Oral two times a day  pantoprazole    Tablet 40 milliGRAM(s) Oral before breakfast  silver sulfADIAZINE 1% Cream 1 Application(s) Topical two times a day  sodium chloride 0.65% Nasal 1 Spray(s) Both Nostrils two times a day PRN  tamsulosin 0.4 milliGRAM(s) Oral at bedtime  tenofovir alafenamide 10 mG/oxaubqpfedxg706 mG/cobicistat 150 mG/emtricitabine 200 mG (GENVOYA) 1 Tablet(s) Oral daily  traZODone 50 milliGRAM(s) Oral at bedtime PRN  valACYclovir 1000 milliGRAM(s) Oral daily      T(C): 36.1 (10-12-18 @ 06:04), Max: 36.1 (10-12-18 @ 06:04)  HR: 77 (10-12-18 @ 06:04) (77 - 77)  BP: 143/77 (10-12-18 @ 06:04) (143/77 - 143/77)  RR: 16 (10-12-18 @ 06:04) (16 - 16)  SpO2: --    PE;   general:  brenda cute cahgnes lying in bed    Lungs:    Heart:    EXT:    Neuro: alert no deficits                          CAPILLARY BLOOD GLUCOSE
pt stable alert in NAD  no new complaints    PARANOIA PSYCHOSIS  ^PARANOIA PSYCHOSIS  No pertinent family history in first degree relatives  No pertinent family history in first degree relatives  Handoff  Afib  HLD (hyperlipidemia)  HTN (hypertension)  DM (diabetes mellitus)  HIV (human immunodeficiency virus infection)  Deep vein thrombosis of both lower extremities  Paranoid schizophrenia  PAF (paroxysmal atrial fibrillation)  Allergy  Bipolar 1 disorder  DM (diabetes mellitus)  HIV disease  Hypertension, unspecified type  Schizoaffective disorder, bipolar type  HTN (hypertension)  Type 2 diabetes mellitus without complication, unspecified whether long term insulin use  Paranoid schizophrenia  Cocaine use disorder, moderate, in sustained remission, in controlled environment  Schizoaffective disorder  Type 2 diabetes mellitus without complication, without long-term current use of insulin  DM (diabetes mellitus)  HIV disease  Bipolar 1 disorder  Schizophrenia  No significant past surgical history  S/P laparotomy    HEALTH ISSUES - PROBLEM Dx:  Hypertension, unspecified type: Hypertension, unspecified type  Schizoaffective disorder, bipolar type  HTN (hypertension): HTN (hypertension)  Type 2 diabetes mellitus without complication, unspecified whether long term insulin use: Type 2 diabetes mellitus without complication, unspecified whether long term insulin use  Paranoid schizophrenia: Paranoid schizophrenia  Cocaine use disorder, moderate, in sustained remission, in controlled environment  Schizoaffective disorder  Type 2 diabetes mellitus without complication, without long-term current use of insulin: Type 2 diabetes mellitus without complication, without long-term current use of insulin  DM (diabetes mellitus): DM (diabetes mellitus)  HIV disease: HIV disease  Bipolar 1 disorder: Bipolar 1 disorder  Schizophrenia: Schizophrenia        PAST MEDICAL & SURGICAL HISTORY:  Afib  HLD (hyperlipidemia)  HTN (hypertension)  DM (diabetes mellitus)  HIV (human immunodeficiency virus infection)  Deep vein thrombosis of both lower extremities  Paranoid schizophrenia  PAF (paroxysmal atrial fibrillation)  Allergy  Bipolar 1 disorder  DM (diabetes mellitus)  HIV disease  No significant past surgical history  S/P laparotomy    No Known Allergies      FAMILY HISTORY:  No pertinent family history in first degree relatives  No pertinent family history in first degree relatives      acetaminophen   Tablet .. 650 milliGRAM(s) Oral every 6 hours PRN  ALBUTerol    90 MICROgram(s) HFA Inhaler 1 Puff(s) Inhalation every 4 hours PRN  ARIPiprazole 20 milliGRAM(s) Oral at bedtime  artificial  tears Solution 1 Drop(s) Both EYES every 4 hours PRN  artificial tears (preservative free) Ophthalmic Solution 1 Drop(s) Both EYES daily  aspirin enteric coated 81 milliGRAM(s) Oral daily  atorvastatin 10 milliGRAM(s) Oral at bedtime  BACItracin   Ointment 1 Application(s) Topical daily  bismuth subsalicylate Liquid 30 milliLiter(s) Oral every 6 hours PRN  buDESOnide  80 MICROgram(s)/formoterol 4.5 MICROgram(s) Inhaler 2 Puff(s) Inhalation two times a day  clopidogrel Tablet 75 milliGRAM(s) Oral daily  diazepam    Tablet 5 milliGRAM(s) Oral three times a day  diltiazem    milliGRAM(s) Oral daily  diphenhydrAMINE   Injectable 50 milliGRAM(s) IntraMuscular once PRN  diVALproex  milliGRAM(s) Oral <User Schedule>  diVALproex DR 1000 milliGRAM(s) Oral at bedtime  docusate sodium 100 milliGRAM(s) Oral two times a day  fluticasone propionate 50 MICROgram(s)/spray Nasal Spray 1 Spray(s) Both Nostrils daily  gabapentin 600 milliGRAM(s) Oral three times a day  haloperidol     Tablet 5 milliGRAM(s) Oral every 6 hours PRN  hydrocortisone 0.5% Ointment 1 Application(s) Topical daily  ipratropium 17 MICROgram(s) HFA Inhaler 1 Puff(s) Inhalation every 6 hours  ketotifen 0.025% Ophthalmic Solution 1 Drop(s) Both EYES at bedtime  lidocaine 1% (Preservative-free) Injectable 1 milliLiter(s) Local Injection once  LORazepam     Tablet 2 milliGRAM(s) Oral every 6 hours PRN  LORazepam   Injectable 2 milliGRAM(s) IntraMuscular once  metFORMIN 500 milliGRAM(s) Oral two times a day  montelukast 10 milliGRAM(s) Oral daily  oxybutynin 5 milliGRAM(s) Oral two times a day  pantoprazole    Tablet 40 milliGRAM(s) Oral before breakfast  silver sulfADIAZINE 1% Cream 1 Application(s) Topical two times a day  sodium chloride 0.65% Nasal 1 Spray(s) Both Nostrils two times a day PRN  tamsulosin 0.4 milliGRAM(s) Oral at bedtime  tenofovir alafenamide 10 mG/myogrlmqjhhy017 mG/cobicistat 150 mG/emtricitabine 200 mG (GENVOYA) 1 Tablet(s) Oral daily  traZODone 50 milliGRAM(s) Oral at bedtime PRN  valACYclovir 1000 milliGRAM(s) Oral daily      T(C): 36.1 (10-08-18 @ 18:17), Max: 36.1 (10-08-18 @ 10:00)  HR: 61 (10-08-18 @ 18:17) (61 - 86)  BP: 116/62 (10-08-18 @ 18:17) (116/62 - 143/90)  RR: 16 (10-08-18 @ 18:17) (16 - 16)  SpO2: --    PE;  general: no acute cahgnes in nad    Lungs:    Heart:    EXT:    Neuro: aelrt no deficits                          CAPILLARY BLOOD GLUCOSE
pt stable alert in NAD  no new complaints    PARANOIA PSYCHOSIS  ^PARANOIA PSYCHOSIS  No pertinent family history in first degree relatives  No pertinent family history in first degree relatives  Handoff  Afib  HLD (hyperlipidemia)  HTN (hypertension)  DM (diabetes mellitus)  HIV (human immunodeficiency virus infection)  Deep vein thrombosis of both lower extremities  Paranoid schizophrenia  PAF (paroxysmal atrial fibrillation)  Allergy  Bipolar 1 disorder  DM (diabetes mellitus)  HIV disease  Schizoaffective disorder, bipolar type  Bruising  HIV (human immunodeficiency virus infection)  Hypertension, unspecified type  Schizoaffective disorder, bipolar type  HTN (hypertension)  Type 2 diabetes mellitus without complication, unspecified whether long term insulin use  Paranoid schizophrenia  Cocaine use disorder, moderate, in sustained remission, in controlled environment  Schizoaffective disorder  Type 2 diabetes mellitus without complication, without long-term current use of insulin  DM (diabetes mellitus)  HIV disease  Bipolar 1 disorder  Schizophrenia  No significant past surgical history  S/P laparotomy    HEALTH ISSUES - PROBLEM Dx:  Schizoaffective disorder, bipolar type  Bruising: Bruising  HIV (human immunodeficiency virus infection): HIV (human immunodeficiency virus infection)  Hypertension, unspecified type: Hypertension, unspecified type  Schizoaffective disorder, bipolar type  HTN (hypertension): HTN (hypertension)  Type 2 diabetes mellitus without complication, unspecified whether long term insulin use: Type 2 diabetes mellitus without complication, unspecified whether long term insulin use  Paranoid schizophrenia: Paranoid schizophrenia  Cocaine use disorder, moderate, in sustained remission, in controlled environment  Schizoaffective disorder  Type 2 diabetes mellitus without complication, without long-term current use of insulin: Type 2 diabetes mellitus without complication, without long-term current use of insulin  DM (diabetes mellitus): DM (diabetes mellitus)  HIV disease: HIV disease  Bipolar 1 disorder: Bipolar 1 disorder  Schizophrenia: Schizophrenia        PAST MEDICAL & SURGICAL HISTORY:  Afib  HLD (hyperlipidemia)  HTN (hypertension)  DM (diabetes mellitus)  HIV (human immunodeficiency virus infection)  Deep vein thrombosis of both lower extremities  Paranoid schizophrenia  PAF (paroxysmal atrial fibrillation)  Allergy  Bipolar 1 disorder  DM (diabetes mellitus)  HIV disease  No significant past surgical history  S/P laparotomy    No Known Allergies      FAMILY HISTORY:  No pertinent family history in first degree relatives  No pertinent family history in first degree relatives      acetaminophen   Tablet .. 650 milliGRAM(s) Oral every 6 hours PRN  acetaminophen   Tablet .. 650 milliGRAM(s) Oral every 6 hours PRN  ALBUTerol    90 MICROgram(s) HFA Inhaler 1 Puff(s) Inhalation every 4 hours PRN  ARIPiprazole 20 milliGRAM(s) Oral at bedtime  artificial  tears Solution 1 Drop(s) Both EYES every 4 hours PRN  aspirin enteric coated 81 milliGRAM(s) Oral daily  atorvastatin 10 milliGRAM(s) Oral at bedtime  bismuth subsalicylate Liquid 30 milliLiter(s) Oral every 6 hours PRN  buDESOnide  80 MICROgram(s)/formoterol 4.5 MICROgram(s) Inhaler 2 Puff(s) Inhalation two times a day  clopidogrel Tablet 75 milliGRAM(s) Oral daily  diazepam    Tablet 5 milliGRAM(s) Oral every 8 hours PRN  diphenhydrAMINE   Injectable 50 milliGRAM(s) IntraMuscular once PRN  diVALproex DR 1000 milliGRAM(s) Oral at bedtime  diVALproex  milliGRAM(s) Oral <User Schedule>  gabapentin 600 milliGRAM(s) Oral three times a day  haloperidol     Tablet 5 milliGRAM(s) Oral every 6 hours PRN  haloperidol     Tablet 10 milliGRAM(s) Oral two times a day  hydrOXYzine hydrochloride 50 milliGRAM(s) Oral every 6 hours PRN  ipratropium 17 MICROgram(s) HFA Inhaler 1 Puff(s) Inhalation every 6 hours  ketotifen 0.025% Ophthalmic Solution 1 Drop(s) Both EYES at bedtime  lidocaine 1% (Preservative-free) Injectable 1 milliLiter(s) Local Injection once  metFORMIN 500 milliGRAM(s) Oral two times a day  oxybutynin 5 milliGRAM(s) Oral two times a day  sodium chloride 0.65% Nasal 1 Spray(s) Both Nostrils two times a day PRN  tamsulosin 0.4 milliGRAM(s) Oral at bedtime  tenofovir alafenamide 10 mG/eokhkicmvtfi112 mG/cobicistat 150 mG/emtricitabine 200 mG (GENVOYA) 1 Tablet(s) Oral daily  traZODone 50 milliGRAM(s) Oral at bedtime PRN      T(C): 35.9 (10-28-18 @ 10:12), Max: 35.9 (10-28-18 @ 06:53)  HR: 107 (10-28-18 @ 10:12) (56 - 107)  BP: 126/64 (10-28-18 @ 10:12) (126/64 - 137/67)  RR: 18 (10-28-18 @ 10:12) (18 - 18)  SpO2: --    PE;  general:  stable no acute cahgnes  Lungs:    Heart:    EXT: from =eechmsoses resolved    Neuro:  no deficits                        CAPILLARY BLOOD GLUCOSE
pt stable alert in NAD  no new complaints    PARANOIA PSYCHOSIS  ^PARANOIA PSYCHOSIS  No pertinent family history in first degree relatives  No pertinent family history in first degree relatives  Handoff  Afib  HLD (hyperlipidemia)  HTN (hypertension)  DM (diabetes mellitus)  HIV (human immunodeficiency virus infection)  Deep vein thrombosis of both lower extremities  Paranoid schizophrenia  PAF (paroxysmal atrial fibrillation)  Allergy  Bipolar 1 disorder  DM (diabetes mellitus)  HIV disease  Schizoaffective disorder, bipolar type  Bruising  HIV (human immunodeficiency virus infection)  Hypertension, unspecified type  Schizoaffective disorder, bipolar type  HTN (hypertension)  Type 2 diabetes mellitus without complication, unspecified whether long term insulin use  Paranoid schizophrenia  Cocaine use disorder, moderate, in sustained remission, in controlled environment  Schizoaffective disorder  Type 2 diabetes mellitus without complication, without long-term current use of insulin  DM (diabetes mellitus)  HIV disease  Bipolar 1 disorder  Schizophrenia  No significant past surgical history  S/P laparotomy    HEALTH ISSUES - PROBLEM Dx:  Schizoaffective disorder, bipolar type  Bruising: Bruising  HIV (human immunodeficiency virus infection): HIV (human immunodeficiency virus infection)  Hypertension, unspecified type: Hypertension, unspecified type  Schizoaffective disorder, bipolar type  HTN (hypertension): HTN (hypertension)  Type 2 diabetes mellitus without complication, unspecified whether long term insulin use: Type 2 diabetes mellitus without complication, unspecified whether long term insulin use  Paranoid schizophrenia: Paranoid schizophrenia  Cocaine use disorder, moderate, in sustained remission, in controlled environment  Schizoaffective disorder  Type 2 diabetes mellitus without complication, without long-term current use of insulin: Type 2 diabetes mellitus without complication, without long-term current use of insulin  DM (diabetes mellitus): DM (diabetes mellitus)  HIV disease: HIV disease  Bipolar 1 disorder: Bipolar 1 disorder  Schizophrenia: Schizophrenia        PAST MEDICAL & SURGICAL HISTORY:  Afib  HLD (hyperlipidemia)  HTN (hypertension)  DM (diabetes mellitus)  HIV (human immunodeficiency virus infection)  Deep vein thrombosis of both lower extremities  Paranoid schizophrenia  PAF (paroxysmal atrial fibrillation)  Allergy  Bipolar 1 disorder  DM (diabetes mellitus)  HIV disease  No significant past surgical history  S/P laparotomy    No Known Allergies      FAMILY HISTORY:  No pertinent family history in first degree relatives  No pertinent family history in first degree relatives      acetaminophen   Tablet .. 650 milliGRAM(s) Oral every 6 hours PRN  acetaminophen   Tablet .. 650 milliGRAM(s) Oral every 6 hours PRN  ALBUTerol    90 MICROgram(s) HFA Inhaler 1 Puff(s) Inhalation every 4 hours PRN  ARIPiprazole 20 milliGRAM(s) Oral at bedtime  artificial  tears Solution 1 Drop(s) Both EYES every 4 hours PRN  aspirin enteric coated 81 milliGRAM(s) Oral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

## 2018-10-29 NOTE — PROGRESS NOTE ADULT - PROVIDER SPECIALTY LIST ADULT
Hospitalist
Hospitalist
Internal Medicine
Podiatry
Psychiatry
Internal Medicine

## 2018-10-29 NOTE — CHART NOTE - NSCHARTNOTEFT_GEN_A_CORE
Social Work Note:    Patient will be going to court today with attending psychiatrist for hearing regarding retention.  This worker will speak with attending psychiatrist this afternoon to discuss outcome of hearing.     Plan is for patient to resume outpatient mental health treatment with his private psychiatrist Dr. Robles.    At this time patient is not psychiatrically stable for discharge.

## 2018-10-29 NOTE — DISCHARGE NOTE BEHAVIORAL HEALTH - NSBHPSYCHMEDTAPERFT_PSY_A_CORE
cross-tapering to be considered as outpatient. pt was to have meds adjusted to monotherapy during this hospitalization if clinical sx allowed, but was released by court order prior to implementation of this plan

## 2018-10-29 NOTE — DISCHARGE NOTE BEHAVIORAL HEALTH - MEDICATION SUMMARY - MEDICATIONS TO STOP TAKING
I will STOP taking the medications listed below when I get home from the hospital:    Depakote 250 mg oral delayed release tablet  -- 3 tab(s) by mouth every 12 hours    traZODone 50 mg oral tablet  -- 1 tab(s) by mouth once a day (at bedtime)    ARIPiprazole 10 mg oral tablet  -- 1 tab(s) by mouth once a day (at bedtime)

## 2018-11-01 DIAGNOSIS — F14.10 COCAINE ABUSE, UNCOMPLICATED: ICD-10-CM

## 2018-11-01 DIAGNOSIS — Z21 ASYMPTOMATIC HUMAN IMMUNODEFICIENCY VIRUS [HIV] INFECTION STATUS: ICD-10-CM

## 2018-11-01 DIAGNOSIS — I48.0 PAROXYSMAL ATRIAL FIBRILLATION: ICD-10-CM

## 2018-11-01 DIAGNOSIS — Z79.82 LONG TERM (CURRENT) USE OF ASPIRIN: ICD-10-CM

## 2018-11-01 DIAGNOSIS — F20.0 PARANOID SCHIZOPHRENIA: ICD-10-CM

## 2018-11-01 DIAGNOSIS — R26.89 OTHER ABNORMALITIES OF GAIT AND MOBILITY: ICD-10-CM

## 2018-11-01 DIAGNOSIS — Z78.1 PHYSICAL RESTRAINT STATUS: ICD-10-CM

## 2018-11-01 DIAGNOSIS — I10 ESSENTIAL (PRIMARY) HYPERTENSION: ICD-10-CM

## 2018-11-01 DIAGNOSIS — E11.9 TYPE 2 DIABETES MELLITUS WITHOUT COMPLICATIONS: ICD-10-CM

## 2018-11-01 DIAGNOSIS — Z91.5 PERSONAL HISTORY OF SELF-HARM: ICD-10-CM

## 2018-11-01 DIAGNOSIS — L97.519 NON-PRESSURE CHRONIC ULCER OF OTHER PART OF RIGHT FOOT WITH UNSPECIFIED SEVERITY: ICD-10-CM

## 2018-11-29 ENCOUNTER — EMERGENCY (EMERGENCY)
Facility: HOSPITAL | Age: 58
LOS: 1 days | Discharge: HOME | End: 2018-11-29
Admitting: EMERGENCY MEDICINE
Payer: MEDICARE

## 2018-11-29 VITALS
SYSTOLIC BLOOD PRESSURE: 126 MMHG | RESPIRATION RATE: 15 BRPM | DIASTOLIC BLOOD PRESSURE: 84 MMHG | HEART RATE: 91 BPM | OXYGEN SATURATION: 98 % | TEMPERATURE: 98 F

## 2018-11-29 DIAGNOSIS — R30.0 DYSURIA: ICD-10-CM

## 2018-11-29 DIAGNOSIS — N39.0 URINARY TRACT INFECTION, SITE NOT SPECIFIED: ICD-10-CM

## 2018-11-29 DIAGNOSIS — B20 HUMAN IMMUNODEFICIENCY VIRUS [HIV] DISEASE: ICD-10-CM

## 2018-11-29 DIAGNOSIS — Z79.82 LONG TERM (CURRENT) USE OF ASPIRIN: ICD-10-CM

## 2018-11-29 DIAGNOSIS — E78.5 HYPERLIPIDEMIA, UNSPECIFIED: ICD-10-CM

## 2018-11-29 DIAGNOSIS — Z79.2 LONG TERM (CURRENT) USE OF ANTIBIOTICS: ICD-10-CM

## 2018-11-29 DIAGNOSIS — Z79.899 OTHER LONG TERM (CURRENT) DRUG THERAPY: ICD-10-CM

## 2018-11-29 DIAGNOSIS — E11.9 TYPE 2 DIABETES MELLITUS WITHOUT COMPLICATIONS: ICD-10-CM

## 2018-11-29 DIAGNOSIS — Z79.84 LONG TERM (CURRENT) USE OF ORAL HYPOGLYCEMIC DRUGS: ICD-10-CM

## 2018-11-29 LAB
ALBUMIN SERPL ELPH-MCNC: 3.4 G/DL — SIGNIFICANT CHANGE UP (ref 3.4–5)
ALP SERPL-CCNC: 68 U/L — SIGNIFICANT CHANGE UP (ref 40–120)
ALT FLD-CCNC: 27 U/L — SIGNIFICANT CHANGE UP (ref 12–42)
ANION GAP SERPL CALC-SCNC: 8 MMOL/L — LOW (ref 9–16)
APPEARANCE UR: CLEAR — SIGNIFICANT CHANGE UP
AST SERPL-CCNC: 17 U/L — SIGNIFICANT CHANGE UP (ref 15–37)
BASOPHILS NFR BLD AUTO: 0.2 % — SIGNIFICANT CHANGE UP (ref 0–2)
BILIRUB SERPL-MCNC: 0.3 MG/DL — SIGNIFICANT CHANGE UP (ref 0.2–1.2)
BILIRUB UR-MCNC: NEGATIVE — SIGNIFICANT CHANGE UP
BUN SERPL-MCNC: 19 MG/DL — SIGNIFICANT CHANGE UP (ref 7–23)
CALCIUM SERPL-MCNC: 9.5 MG/DL — SIGNIFICANT CHANGE UP (ref 8.5–10.5)
CHLORIDE SERPL-SCNC: 108 MMOL/L — SIGNIFICANT CHANGE UP (ref 96–108)
CO2 SERPL-SCNC: 25 MMOL/L — SIGNIFICANT CHANGE UP (ref 22–31)
COLOR SPEC: YELLOW — SIGNIFICANT CHANGE UP
CREAT SERPL-MCNC: 0.92 MG/DL — SIGNIFICANT CHANGE UP (ref 0.5–1.3)
DIFF PNL FLD: NEGATIVE — SIGNIFICANT CHANGE UP
EOSINOPHIL NFR BLD AUTO: 0.5 % — SIGNIFICANT CHANGE UP (ref 0–6)
GLUCOSE SERPL-MCNC: 102 MG/DL — HIGH (ref 70–99)
GLUCOSE UR QL: NEGATIVE — SIGNIFICANT CHANGE UP
HCT VFR BLD CALC: 36.1 % — LOW (ref 39–50)
HGB BLD-MCNC: 12.5 G/DL — LOW (ref 13–17)
IMM GRANULOCYTES NFR BLD AUTO: 0.2 % — SIGNIFICANT CHANGE UP (ref 0–1.5)
KETONES UR-MCNC: NEGATIVE — SIGNIFICANT CHANGE UP
LEUKOCYTE ESTERASE UR-ACNC: ABNORMAL
LIDOCAIN IGE QN: 75 U/L — SIGNIFICANT CHANGE UP (ref 73–393)
LYMPHOCYTES # BLD AUTO: 34 % — SIGNIFICANT CHANGE UP (ref 13–44)
MCHC RBC-ENTMCNC: 29.7 PG — SIGNIFICANT CHANGE UP (ref 27–34)
MCHC RBC-ENTMCNC: 34.6 G/DL — SIGNIFICANT CHANGE UP (ref 32–36)
MCV RBC AUTO: 85.7 FL — SIGNIFICANT CHANGE UP (ref 80–100)
MONOCYTES NFR BLD AUTO: 11.2 % — SIGNIFICANT CHANGE UP (ref 2–14)
NEUTROPHILS NFR BLD AUTO: 53.9 % — SIGNIFICANT CHANGE UP (ref 43–77)
NITRITE UR-MCNC: POSITIVE
PCP SPEC-MCNC: SIGNIFICANT CHANGE UP
PH UR: 5.5 — SIGNIFICANT CHANGE UP (ref 5–8)
PLATELET # BLD AUTO: 137 K/UL — LOW (ref 150–400)
POTASSIUM SERPL-MCNC: 3.8 MMOL/L — SIGNIFICANT CHANGE UP (ref 3.5–5.3)
POTASSIUM SERPL-SCNC: 3.8 MMOL/L — SIGNIFICANT CHANGE UP (ref 3.5–5.3)
PROT SERPL-MCNC: 7.5 G/DL — SIGNIFICANT CHANGE UP (ref 6.4–8.2)
PROT UR-MCNC: NEGATIVE MG/DL — SIGNIFICANT CHANGE UP
RBC # BLD: 4.21 M/UL — SIGNIFICANT CHANGE UP (ref 4.2–5.8)
RBC # FLD: 15.9 % — HIGH (ref 10.3–14.5)
SODIUM SERPL-SCNC: 141 MMOL/L — SIGNIFICANT CHANGE UP (ref 132–145)
SP GR SPEC: >=1.03 — SIGNIFICANT CHANGE UP (ref 1–1.03)
UROBILINOGEN FLD QL: 0.2 E.U./DL — SIGNIFICANT CHANGE UP
WBC # BLD: 4.3 K/UL — SIGNIFICANT CHANGE UP (ref 3.8–10.5)
WBC # FLD AUTO: 4.3 K/UL — SIGNIFICANT CHANGE UP (ref 3.8–10.5)

## 2018-11-29 PROCEDURE — 99284 EMERGENCY DEPT VISIT MOD MDM: CPT

## 2018-11-29 RX ORDER — ACETAMINOPHEN 500 MG
975 TABLET ORAL ONCE
Qty: 0 | Refills: 0 | Status: COMPLETED | OUTPATIENT
Start: 2018-11-29 | End: 2018-11-29

## 2018-11-29 RX ORDER — SODIUM CHLORIDE 9 MG/ML
1000 INJECTION INTRAMUSCULAR; INTRAVENOUS; SUBCUTANEOUS ONCE
Qty: 0 | Refills: 0 | Status: COMPLETED | OUTPATIENT
Start: 2018-11-29 | End: 2018-11-29

## 2018-11-29 RX ORDER — CEFUROXIME AXETIL 250 MG
1 TABLET ORAL
Qty: 14 | Refills: 0 | OUTPATIENT
Start: 2018-11-29 | End: 2018-12-05

## 2018-11-29 RX ORDER — CEFUROXIME AXETIL 250 MG
250 TABLET ORAL ONCE
Qty: 0 | Refills: 0 | Status: COMPLETED | OUTPATIENT
Start: 2018-11-29 | End: 2018-11-29

## 2018-11-29 RX ADMIN — Medication 975 MILLIGRAM(S): at 23:00

## 2018-11-29 RX ADMIN — SODIUM CHLORIDE 1000 MILLILITER(S): 9 INJECTION INTRAMUSCULAR; INTRAVENOUS; SUBCUTANEOUS at 22:55

## 2018-11-29 RX ADMIN — SODIUM CHLORIDE 1000 MILLILITER(S): 9 INJECTION INTRAMUSCULAR; INTRAVENOUS; SUBCUTANEOUS at 21:55

## 2018-11-29 RX ADMIN — Medication 250 MILLIGRAM(S): at 23:00

## 2018-11-29 NOTE — ED PROVIDER NOTE - NSFOLLOWUPINSTRUCTIONS_ED_ALL_ED_FT
You have a urine infection. Take antibiotics as prescribed. Return for any worsening or changing symptoms
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
## 2018-11-29 NOTE — ED PROVIDER NOTE - PMH
Afib    Allergy    Bipolar 1 disorder    Deep vein thrombosis of both lower extremities    DM (diabetes mellitus)    DM (diabetes mellitus)    HIV (human immunodeficiency virus infection)    HIV disease    HLD (hyperlipidemia)    HTN (hypertension)    PAF (paroxysmal atrial fibrillation)    Paranoid schizophrenia

## 2018-11-29 NOTE — ED ADULT TRIAGE NOTE - CHIEF COMPLAINT QUOTE
Pt with complaint of "kidney pain, dehydration, dizziness, and blurry vision" X 1 week. States he believes he is being poisoned.

## 2018-11-29 NOTE — ED PROVIDER NOTE - NS ED ROS FT
· CONSTITUTIONAL: no fever and no chills.  · CARDIOVASCULAR: normal rate and rhythm, no chest pain and no edema.  · RESPIRATORY: no chest pain, no cough, and no shortness of breath.  · GASTROINTESTINAL: no abdominal pain, no bloating, no constipation, no diarrhea, no nausea and no vomiting. +dysuria  · MUSCULOSKELETAL: no back pain, no musculoskeletal pain, no neck pain, and no weakness.  · SKIN: no abrasions, no jaundice, no lesions, no pruritis, and no rashes.  · NEURO: no loss of consciousness, no gait abnormality, no headache, no sensory deficits, and no weakness.  · PSYCHIATRIC: no known mental health issues.

## 2018-11-29 NOTE — ED PROVIDER NOTE - PROGRESS NOTE DETAILS
labs reviewed. patient with UTI. will treat with PO abx. declined IV abx. patient declines further work up at this time

## 2018-11-30 PROBLEM — I10 ESSENTIAL (PRIMARY) HYPERTENSION: Chronic | Status: ACTIVE | Noted: 2018-09-23

## 2018-11-30 PROBLEM — E11.9 TYPE 2 DIABETES MELLITUS WITHOUT COMPLICATIONS: Chronic | Status: ACTIVE | Noted: 2018-09-23

## 2018-11-30 PROBLEM — I48.91 UNSPECIFIED ATRIAL FIBRILLATION: Chronic | Status: ACTIVE | Noted: 2018-09-23

## 2018-11-30 PROBLEM — B20 HUMAN IMMUNODEFICIENCY VIRUS [HIV] DISEASE: Chronic | Status: ACTIVE | Noted: 2018-09-23

## 2018-11-30 PROBLEM — F20.0 PARANOID SCHIZOPHRENIA: Chronic | Status: ACTIVE | Noted: 2018-09-23

## 2018-11-30 PROBLEM — E78.5 HYPERLIPIDEMIA, UNSPECIFIED: Chronic | Status: ACTIVE | Noted: 2018-09-23

## 2018-11-30 PROBLEM — I82.403 ACUTE EMBOLISM AND THROMBOSIS OF UNSPECIFIED DEEP VEINS OF LOWER EXTREMITY, BILATERAL: Chronic | Status: ACTIVE | Noted: 2018-09-23

## 2018-11-30 LAB
C TRACH RRNA SPEC QL NAA+PROBE: SIGNIFICANT CHANGE UP
N GONORRHOEA RRNA SPEC QL NAA+PROBE: SIGNIFICANT CHANGE UP
SPECIMEN SOURCE: SIGNIFICANT CHANGE UP

## 2018-12-01 ENCOUNTER — EMERGENCY (EMERGENCY)
Facility: HOSPITAL | Age: 58
LOS: 1 days | Discharge: HOME | End: 2018-12-01
Attending: EMERGENCY MEDICINE | Admitting: EMERGENCY MEDICINE
Payer: MEDICARE

## 2018-12-01 VITALS
OXYGEN SATURATION: 100 % | TEMPERATURE: 98 F | DIASTOLIC BLOOD PRESSURE: 82 MMHG | HEART RATE: 86 BPM | SYSTOLIC BLOOD PRESSURE: 142 MMHG | RESPIRATION RATE: 16 BRPM

## 2018-12-01 VITALS
RESPIRATION RATE: 16 BRPM | OXYGEN SATURATION: 100 % | DIASTOLIC BLOOD PRESSURE: 79 MMHG | SYSTOLIC BLOOD PRESSURE: 142 MMHG | HEART RATE: 68 BPM

## 2018-12-01 LAB
-  AMIKACIN: SIGNIFICANT CHANGE UP
-  AMOXICILLIN/CLAVULANIC ACID: SIGNIFICANT CHANGE UP
-  AMPICILLIN/SULBACTAM: SIGNIFICANT CHANGE UP
-  AMPICILLIN: SIGNIFICANT CHANGE UP
-  AZTREONAM: SIGNIFICANT CHANGE UP
-  CEFAZOLIN: SIGNIFICANT CHANGE UP
-  CEFEPIME: SIGNIFICANT CHANGE UP
-  CEFOXITIN: SIGNIFICANT CHANGE UP
-  CEFTRIAXONE: SIGNIFICANT CHANGE UP
-  CIPROFLOXACIN: SIGNIFICANT CHANGE UP
-  ERTAPENEM: SIGNIFICANT CHANGE UP
-  GENTAMICIN: SIGNIFICANT CHANGE UP
-  IMIPENEM: SIGNIFICANT CHANGE UP
-  LEVOFLOXACIN: SIGNIFICANT CHANGE UP
-  MEROPENEM: SIGNIFICANT CHANGE UP
-  NITROFURANTOIN: SIGNIFICANT CHANGE UP
-  PIPERACILLIN/TAZOBACTAM: SIGNIFICANT CHANGE UP
-  TIGECYCLINE: SIGNIFICANT CHANGE UP
-  TOBRAMYCIN: SIGNIFICANT CHANGE UP
-  TRIMETHOPRIM/SULFAMETHOXAZOLE: SIGNIFICANT CHANGE UP
ALBUMIN SERPL ELPH-MCNC: 3.9 G/DL — SIGNIFICANT CHANGE UP (ref 3.3–5)
ALP SERPL-CCNC: 58 U/L — SIGNIFICANT CHANGE UP (ref 40–120)
ALT FLD-CCNC: SIGNIFICANT CHANGE UP U/L (ref 4–41)
AMPHET UR-MCNC: NEGATIVE — SIGNIFICANT CHANGE UP
APAP SERPL-MCNC: < 15 UG/ML — LOW (ref 15–25)
APPEARANCE UR: CLEAR — SIGNIFICANT CHANGE UP
AST SERPL-CCNC: SIGNIFICANT CHANGE UP U/L (ref 4–40)
BACTERIA # UR AUTO: HIGH
BARBITURATES UR SCN-MCNC: NEGATIVE — SIGNIFICANT CHANGE UP
BASOPHILS # BLD AUTO: 0.02 K/UL — SIGNIFICANT CHANGE UP (ref 0–0.2)
BASOPHILS NFR BLD AUTO: 0.4 % — SIGNIFICANT CHANGE UP (ref 0–2)
BENZODIAZ UR-MCNC: POSITIVE — SIGNIFICANT CHANGE UP
BILIRUB SERPL-MCNC: 0.6 MG/DL — SIGNIFICANT CHANGE UP (ref 0.2–1.2)
BILIRUB UR-MCNC: NEGATIVE — SIGNIFICANT CHANGE UP
BLOOD UR QL VISUAL: NEGATIVE — SIGNIFICANT CHANGE UP
BUN SERPL-MCNC: 12 MG/DL — SIGNIFICANT CHANGE UP (ref 7–23)
CALCIUM SERPL-MCNC: 9.1 MG/DL — SIGNIFICANT CHANGE UP (ref 8.4–10.5)
CANNABINOIDS UR-MCNC: NEGATIVE — SIGNIFICANT CHANGE UP
CHLORIDE SERPL-SCNC: 104 MMOL/L — SIGNIFICANT CHANGE UP (ref 98–107)
CO2 SERPL-SCNC: 20 MMOL/L — LOW (ref 22–31)
COCAINE METAB.OTHER UR-MCNC: NEGATIVE — SIGNIFICANT CHANGE UP
COLOR SPEC: YELLOW — SIGNIFICANT CHANGE UP
CREAT SERPL-MCNC: 0.72 MG/DL — SIGNIFICANT CHANGE UP (ref 0.5–1.3)
CULTURE RESULTS: SIGNIFICANT CHANGE UP
EOSINOPHIL # BLD AUTO: 0.06 K/UL — SIGNIFICANT CHANGE UP (ref 0–0.5)
EOSINOPHIL NFR BLD AUTO: 1.3 % — SIGNIFICANT CHANGE UP (ref 0–6)
EPI CELLS # UR: SIGNIFICANT CHANGE UP
ETHANOL BLD-MCNC: < 10 MG/DL — SIGNIFICANT CHANGE UP
GLUCOSE SERPL-MCNC: 79 MG/DL — SIGNIFICANT CHANGE UP (ref 70–99)
GLUCOSE UR-MCNC: NEGATIVE — SIGNIFICANT CHANGE UP
HCT VFR BLD CALC: 39.8 % — SIGNIFICANT CHANGE UP (ref 39–50)
HGB BLD-MCNC: 13.3 G/DL — SIGNIFICANT CHANGE UP (ref 13–17)
IMM GRANULOCYTES # BLD AUTO: 0.01 # — SIGNIFICANT CHANGE UP
IMM GRANULOCYTES NFR BLD AUTO: 0.2 % — SIGNIFICANT CHANGE UP (ref 0–1.5)
KETONES UR-MCNC: NEGATIVE — SIGNIFICANT CHANGE UP
LEUKOCYTE ESTERASE UR-ACNC: SIGNIFICANT CHANGE UP
LIDOCAIN IGE QN: 33 U/L — SIGNIFICANT CHANGE UP (ref 7–60)
LYMPHOCYTES # BLD AUTO: 1.85 K/UL — SIGNIFICANT CHANGE UP (ref 1–3.3)
LYMPHOCYTES # BLD AUTO: 38.8 % — SIGNIFICANT CHANGE UP (ref 13–44)
MCHC RBC-ENTMCNC: 29.2 PG — SIGNIFICANT CHANGE UP (ref 27–34)
MCHC RBC-ENTMCNC: 33.4 % — SIGNIFICANT CHANGE UP (ref 32–36)
MCV RBC AUTO: 87.5 FL — SIGNIFICANT CHANGE UP (ref 80–100)
METHADONE UR-MCNC: NEGATIVE — SIGNIFICANT CHANGE UP
METHOD TYPE: SIGNIFICANT CHANGE UP
MONOCYTES # BLD AUTO: 0.76 K/UL — SIGNIFICANT CHANGE UP (ref 0–0.9)
MONOCYTES NFR BLD AUTO: 15.9 % — HIGH (ref 2–14)
NEUTROPHILS # BLD AUTO: 2.07 K/UL — SIGNIFICANT CHANGE UP (ref 1.8–7.4)
NEUTROPHILS NFR BLD AUTO: 43.4 % — SIGNIFICANT CHANGE UP (ref 43–77)
NITRITE UR-MCNC: POSITIVE — HIGH
NRBC # FLD: 0 — SIGNIFICANT CHANGE UP
OPIATES UR-MCNC: NEGATIVE — SIGNIFICANT CHANGE UP
ORGANISM # SPEC MICROSCOPIC CNT: SIGNIFICANT CHANGE UP
ORGANISM # SPEC MICROSCOPIC CNT: SIGNIFICANT CHANGE UP
OXYCODONE UR-MCNC: NEGATIVE — SIGNIFICANT CHANGE UP
PCP UR-MCNC: NEGATIVE — SIGNIFICANT CHANGE UP
PH UR: 7.5 — SIGNIFICANT CHANGE UP (ref 5–8)
PLATELET # BLD AUTO: 146 K/UL — LOW (ref 150–400)
PMV BLD: 11 FL — SIGNIFICANT CHANGE UP (ref 7–13)
POTASSIUM SERPL-MCNC: SIGNIFICANT CHANGE UP MMOL/L (ref 3.5–5.3)
POTASSIUM SERPL-SCNC: SIGNIFICANT CHANGE UP MMOL/L (ref 3.5–5.3)
PROT SERPL-MCNC: SIGNIFICANT CHANGE UP G/DL (ref 6–8.3)
PROT UR-MCNC: NEGATIVE — SIGNIFICANT CHANGE UP
RBC # BLD: 4.55 M/UL — SIGNIFICANT CHANGE UP (ref 4.2–5.8)
RBC # FLD: 16.6 % — HIGH (ref 10.3–14.5)
RBC CASTS # UR COMP ASSIST: SIGNIFICANT CHANGE UP (ref 0–?)
SALICYLATES SERPL-MCNC: < 5 MG/DL — LOW (ref 15–30)
SODIUM SERPL-SCNC: 135 MMOL/L — SIGNIFICANT CHANGE UP (ref 135–145)
SP GR SPEC: 1.02 — SIGNIFICANT CHANGE UP (ref 1–1.04)
SPECIMEN SOURCE: SIGNIFICANT CHANGE UP
TSH SERPL-MCNC: 1.76 UIU/ML — SIGNIFICANT CHANGE UP (ref 0.27–4.2)
UROBILINOGEN FLD QL: NORMAL — SIGNIFICANT CHANGE UP
WBC # BLD: 4.77 K/UL — SIGNIFICANT CHANGE UP (ref 3.8–10.5)
WBC # FLD AUTO: 4.77 K/UL — SIGNIFICANT CHANGE UP (ref 3.8–10.5)
WBC UR QL: HIGH (ref 0–?)

## 2018-12-01 PROCEDURE — 99283 EMERGENCY DEPT VISIT LOW MDM: CPT | Mod: GC,25

## 2018-12-01 PROCEDURE — 90792 PSYCH DIAG EVAL W/MED SRVCS: CPT | Mod: GC

## 2018-12-01 RX ORDER — CIPROFLOXACIN LACTATE 400MG/40ML
250 VIAL (ML) INTRAVENOUS ONCE
Qty: 0 | Refills: 0 | Status: DISCONTINUED | OUTPATIENT
Start: 2018-12-01 | End: 2018-12-01

## 2018-12-01 RX ORDER — CIPROFLOXACIN LACTATE 400MG/40ML
500 VIAL (ML) INTRAVENOUS ONCE
Qty: 0 | Refills: 0 | Status: COMPLETED | OUTPATIENT
Start: 2018-12-01 | End: 2018-12-01

## 2018-12-01 RX ORDER — CIPROFLOXACIN LACTATE 400MG/40ML
1 VIAL (ML) INTRAVENOUS
Qty: 10 | Refills: 0
Start: 2018-12-01 | End: 2018-12-05

## 2018-12-01 RX ADMIN — Medication 500 MILLIGRAM(S): at 10:30

## 2018-12-01 NOTE — ED PROVIDER NOTE - NS ED ROS FT
GENERAL: No fever or chills, EYES: no change in vision, HEENT: no trouble swallowing or speaking, CARDIAC: no chest pain, PULMONARY: no cough or SOB, GI: +abdominal pain, no nausea, no vomiting, no diarrhea or constipation, : No changes in urination, SKIN: no rashes, NEURO: no headache,  MSK: No joint pain ~Fabiana Cornejo M.D. Resident

## 2018-12-01 NOTE — ED BEHAVIORAL HEALTH ASSESSMENT NOTE - SUICIDE PROTECTIVE FACTORS
Identifies reasons for living/Fear of death or dying due to pain/suffering/High spirituality/Engaged in work or school/Positive therapeutic relationships/Future oriented/Supportive social network or family
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
## 2018-12-01 NOTE — ED BEHAVIORAL HEALTH ASSESSMENT NOTE - SUMMARY
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 Elly Robles at [facility], BIB EMS activated by self for physical symptoms he believes to be due to poisoning.  Pt endorsing belief that his aunt and uncle are trying to poison him, which appears to be a delusion that is chronic in nature. He denies any SI/I/P or HI/I/P. Thought process is linear and pt is at his baseline level of functioning (attending to all ADLs, going to work daily, etc). He declines voluntary psychiatric hospitalization and reports plan to live in a shelter until his  at [facility] is able to arrange for alternative living arrangements. Given that pt is not an imminent threat to himself or others at this time, he does not warrant involuntary psychiatric hospitalization. Pt is agreeable to f/u with his outpt psychiatrist at [place] and will be discharged.

## 2018-12-01 NOTE — ED BEHAVIORAL HEALTH ASSESSMENT NOTE - AXIS IV
Problem related to social environment/Problems with primary support Problem related to social environment/Housing problems

## 2018-12-01 NOTE — ED BEHAVIORAL HEALTH ASSESSMENT NOTE - RISK ASSESSMENT
Risk factors include active psychosis, recent psychiatric hospitalization, hx of substance abuse, and hx of several suicidal gestures, of which are mitigated by denial of any SI/I/P or HI/I/P, future orientation, compliance with meds and engagement in outpt treatment, supportive friends/family, no depression/anhedonia/insomnia, sober and not using any substances for the past 2.5 years, treatment-seeking (brought self to the ED), and able to engage in safety planning.
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
## 2018-12-01 NOTE — ED PROVIDER NOTE - NSFOLLOWUPINSTRUCTIONS_ED_ALL_ED_FT
Please seek care if you have new chest pain, shortness of breath, fevers, or worsening of symptoms that brought you to the emergency room today.  Please follow up with your primary care physician in 1-2 days or as soon as possible.    Take ciprofloxacin as prescribed, sent to your pharmacy.

## 2018-12-01 NOTE — ED BEHAVIORAL HEALTH ASSESSMENT NOTE - DETAILS
20 yrs ago tried to self strangulate; several recent suicidal gestures while inpatient at [facility] possible dyskinesia from risperidone and haldol "weak" "dry mouth" "kidney pain" self

## 2018-12-01 NOTE — ED BEHAVIORAL HEALTH ASSESSMENT NOTE - MEDICATIONS (PRESCRIPTIONS, DIRECTIONS)
c/w outpt meds (Abilify 20mg, Depakote 500mg qAM + 1500mg qhs, Valium 5mg TID prn anxiety, Lexapro 20mg)
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
## 2018-12-01 NOTE — ED PROVIDER NOTE - MEDICAL DECISION MAKING DETAILS
57 yo M PMHx DM2, HTN, HLD, HIV (viral load undetectable), paranoid schizophrenia p/w abd pain and generalized feeling unwell, abd S/NT, will check basic labs + TSH, tox screen, EKG, psych evaluation

## 2018-12-01 NOTE — ED BEHAVIORAL HEALTH ASSESSMENT NOTE - NS ED BHA PLAN TR BH CONTACTED FT
spoke with on call physician (Dr. Cameron) at [facility], who will inform psychiatrist Dr. Elly Robles of ED visit

## 2018-12-01 NOTE — ED BEHAVIORAL HEALTH ASSESSMENT NOTE - OTHER PAST PSYCHIATRIC HISTORY (INCLUDE DETAILS REGARDING ONSET, COURSE OF ILLNESS, INPATIENT/OUTPATIENT TREATMENT)
schizoaffective d/o, 2 prior psychiatric hospitalizations (one 20yrs ago at [place], one recently at [facility] 9/24/18 - 10/29/18), remote hx of SA via self-strangulation as well as several suicidal gestures while recently inpatient at [facility] (tied a sheet around his neck, banging head on the wall), currently in outpatient tx with Dr. Elly Robles at [facility]

## 2018-12-01 NOTE — ED BEHAVIORAL HEALTH ASSESSMENT NOTE - REFERRAL / APPOINTMENT DETAILS
Pt to f/u with outpt psychiatrist Dr. Elly Robles at [facility] (pt has appt 12/12/18, on-call physician informed of ED visit)

## 2018-12-01 NOTE — ED BEHAVIORAL HEALTH ASSESSMENT NOTE - CURRENT MEDICATION
Abilify 20mg qd, diazepam 5mg tid prn anxiety, Depakote 500mg qAM + 1000mg qhs, trazodone 50mg prn, Genvoya 150mg qd, lipitor 10mg qhs, metoformin 1000mg bid, aspirin 81mg, Tamulosin 0.4mg qd, gabapentin 300mg tid, Ditropan XL 10mg Abilify 20mg qd, diazepam 5mg tid prn anxiety, Depakote 500mg qAM + 1000mg qhs, trazodone 50mg prn, Lexapro 20mg, Genvoya 150mg qd, lipitor 10mg qhs, metoformin 1000mg bid, aspirin 81mg, Tamulosin 0.4mg qd, gabapentin 300mg tid, Ditropan XL 10mg

## 2018-12-01 NOTE — ED BEHAVIORAL HEALTH ASSESSMENT NOTE - DESCRIPTION
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    Vital Signs Last 24 Hrs  T(C): 36.5 (01 Dec 2018 06:43), Max: 36.9 (01 Dec 2018 04:40)  T(F): 97.7 (01 Dec 2018 06:43), Max: 98.4 (01 Dec 2018 04:40)  HR: 68 (01 Dec 2018 11:07) (63 - 86)  BP: 142/79 (01 Dec 2018 11:07) (128/83 - 151/87)  BP(mean): --  RR: 16 (01 Dec 2018 11:07) (15 - 18)  SpO2: 100% (01 Dec 2018 11:07) (100% - 100%)
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
## 2018-12-01 NOTE — ED ADULT NURSE NOTE - NSIMPLEMENTINTERV_GEN_ALL_ED
Implemented All Universal Safety Interventions:  [place] to call system. Call bell, personal items and telephone within reach. Instruct patient to call for assistance. Room bathroom lighting operational. Non-slip footwear when patient is off stretcher. Physically safe environment: no spills, clutter or unnecessary equipment. Stretcher in lowest position, wheels locked, appropriate side rails in place.

## 2018-12-01 NOTE — ED BEHAVIORAL HEALTH NOTE - BEHAVIORAL HEALTH NOTE
Worker spoke to patient’s aunt Mila Flores ([phone]) for collateral information. All information is as per Mrs. Flores:    Patient is a 58 year old female, domiciled with aunt and uncle, with a last psychiatric hospital at [facility], BIBEMS activated for abdominal pain. Mrs. Flores states that the patient lives with her and her  for about 5 years now. She states that the patient was admitted to [facility] a month ago and he is diagnosed with Paranoid schizophrenia. She states that this morning the patient left the house and she thought he went to the store. Mrs. Flores states that the patient has never been violent towards her or her  but when he was admitted last month he was experiencing paranoia where he thought people was poisoning his food and drink. She states that he was thought someone was after him. She states that the patient is having SI/HI and for the last two days and he was quiet and did not really talk as much. She states on Thursday the patient did not come home and did not show up until Friday which is not usually like him. She believes that the patient is having issues with his boyfriend but then states that the patient did not take his nighttime medication (unsure name) for three days but has been compliant with his morning medications. She states this paranoia of believing that people is poisoning his food has been chronic and he has thought this way for years. She states that the patient is not on any drugs or alcohol and has been showering and eating regularly. Worker informed Mrs. Flores of patient discharge by MD.

## 2018-12-02 ENCOUNTER — EMERGENCY (EMERGENCY)
Facility: HOSPITAL | Age: 58
LOS: 1 days | Discharge: HOME | End: 2018-12-02
Attending: EMERGENCY MEDICINE | Admitting: EMERGENCY MEDICINE
Payer: MEDICARE

## 2018-12-02 VITALS
DIASTOLIC BLOOD PRESSURE: 71 MMHG | TEMPERATURE: 99 F | OXYGEN SATURATION: 100 % | RESPIRATION RATE: 18 BRPM | HEART RATE: 81 BPM | SYSTOLIC BLOOD PRESSURE: 134 MMHG

## 2018-12-02 PROCEDURE — 99284 EMERGENCY DEPT VISIT MOD MDM: CPT

## 2018-12-02 RX ORDER — CEFTRIAXONE 500 MG/1
1 INJECTION, POWDER, FOR SOLUTION INTRAMUSCULAR; INTRAVENOUS ONCE
Qty: 0 | Refills: 0 | Status: COMPLETED | OUTPATIENT
Start: 2018-12-02 | End: 2018-12-02

## 2018-12-02 RX ORDER — CEPHALEXIN 500 MG
1 CAPSULE ORAL
Qty: 20 | Refills: 0
Start: 2018-12-02 | End: 2018-12-11

## 2018-12-02 RX ADMIN — CEFTRIAXONE 100 GRAM(S): 500 INJECTION, POWDER, FOR SOLUTION INTRAMUSCULAR; INTRAVENOUS at 19:32
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
## 2018-12-02 NOTE — ED PROVIDER NOTE - OBJECTIVE STATEMENT
57 y/o M PMH HIV - viral load undetectable, CD4 approx 500, HTN, DM, paranoid schizophrenia, recently dx with UTI c/o generalized bodyaches, chills, subjective fever, b/l abdominal pain radiating to b/l flank x 2 weeks. Pt states his aunt who organizes his pill box has been putting oxybutynin in it for 2 weeks - taking 1 pill/day. Pt came to ED with similar complaints yesterday thinking he was taking arsenic pills but after looking up the pill by the number on it he found out it is actually oxybutynin. Pt states he was having dysuria 2 weeks ago but has resolved despite never taking the ciprofloxacin that was prescribed. Denies CP, SOB, frequency, hematuria, n/v, si/hi, ah/vh.  Of note, upon review of pt's prior urine culture - infection was resistant to cipro

## 2018-12-02 NOTE — ED PROVIDER NOTE - ATTENDING CONTRIBUTION TO CARE
pt with concerns of being given oxybutin without his knowledge.  However today is reporting having pain in his flanks.  This pain is new.  Was seen a few days ago at OSH Where was Dx with a UTI and given Cipro (which was later found to be resistant) and has not taken meds yet.  No NVD no fevers    Gen: Well appearing in NAD  Head: NC/AT  Neck: trachea midline  Resp:  No distress  : + CVA TTP  Ext: no deformities  Neuro:  A&O appears non focal  Skin:  Warm and dry as visualized  Psych:  Odd affect and mood     Pt with untreated UTI that is being managed with inappropriate ABx.  Will change ABx to cover the culture in the system.  The patient was recently seen and cleared by  for his odd behaviors

## 2018-12-02 NOTE — ED PROVIDER NOTE - MEDICAL DECISION MAKING DETAILS
pt concerned about unintentionally taking 1 oxybutynin/day x 2 weeks however has untreated UTI with ssx of pyelo. Pt nontoxic appearing.  -will treat with iv ceftriaxone and dc on keflex

## 2018-12-02 NOTE — PROVIDER CONTACT NOTE (OTHER) - BACKGROUND
SW contacted by MD who states Pt asks to speak with a SW, Pt has psych hx was recently here for UTI.  SW met with Pt who is A&Ox3, Pt informs he is living at the [facility] in [place], [place]

## 2018-12-02 NOTE — ED ADULT TRIAGE NOTE - CHIEF COMPLAINT QUOTE
BIBA from home, was here last week with back pain and diagnosed with UTI, discharged home. Pt states he now found an unknown pill among his own pills that he's been taking for 2 weeks, was found to be a pill of oxybutin, pt concerned he could have accidentally taken more of those pills. Pt c/o continued back pain and "lungs are getting tight".
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
## 2018-12-02 NOTE — ED PROVIDER NOTE - NSFOLLOWUPINSTRUCTIONS_ED_ALL_ED_FT
Follow up with your PMD within 48-72 hours.  Take Keflex 500 mg 1 tab 2x/day for 10 days.  Increase fluids. Motrin 600mg every 8 hours with food for pain. Worsening pain, new fever, chills, nausea, vomiting return to ER
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
## 2019-03-28 NOTE — PROGRESS NOTE BEHAVIORAL HEALTH - NS ED BHA MSE SPEECH SPONTANEITY
Normal
EXAM:  CT BRAIN                            PROCEDURE DATE:  03/20/2019            INTERPRETATION:  CLINICAL INFORMATION: Follow-up subdural hemorrhage.   Intracranial hemorrhage follow-up.    TECHNIQUE: Noncontrast axial CT images were acquired through the head.   Two-dimensional sagittal and coronal reformats were obtained    COMPARISON: CT head from 3/19/2019.    FINDINGS:     Unchanged hyperdense right subdural collection measuring 4 mm in maximum   transverse dimension with effacement of adjacent sulci. No new   hemorrhage, hydrocephalus or midline shift.    Mild age-related resolution of intraventricular chronic microvascular   white matter ischemic changes. The basal cisterns are patent.    Small parietal extracalvarial soft tissue swelling with overlying   surgical staples. The calvarium is intact.    Paranasal sinuses and mastoid air cells are free of acute disease.    IMPRESSION:     Stable right-sided subdural hematoma.
Normal

## 2019-04-17 NOTE — PROGRESS NOTE BEHAVIORAL HEALTH - NS ED BHA REVIEW OF ED CHART VITAL SIGNS REVIEWED
89yo M with Past Medical History COPD (off Home O2), HTN , Atrial Fibrillation on eliquis, Adenoid cystic carcinoma status post surgeries, and HLD admitted for worsening dyspnea secondary to a PNX status post fall.  His clinical course was complicated by hypoglycemia and symptomatic, junctional bradycardia.      1.	Bradycardia likely due to meds  2.	S/P mechanical fall & rib Fxx  3.	Lt PTX S/P CT placement and removal  4.	A. Fib on Eliquis  5.	COPD  6.	HTN / DL  7.	Prediabetic  8.	L3 compression Fx.  9.	Liver Cyst         PLAN:    ·	Pt not bradycardiac anymore.   ·	Change Eliquis to 5 mg po q 12h  ·	TSH level is 1.7  ·	Neurosurgery eval noted. No neurosurgical intervention.  ·	Liver cyst size has decreased since 2018. Out pt F/U with GI  ·	Can D/C him to SNF
None available
89yo M with Past Medical History COPD (off Home O2), HTN , Atrial Fibrillation on eliquis, Adenoid cystic carcinoma status post surgeries, and HLD admitted for worsening dyspnea secondary to a PNX status post fall.  His clinical course was complicated by hypoglycemia and symptomatic, junctional bradycardia.      1.	Bradycardia likely due to meds  2.	S/P mechanical fall & rib Fxx  3.	Lt PTX S/P CT placement and removal  4.	A. Fib on Eliquis  5.	COPD  6.	HTN / DL  7.	Prediabetic  8.	L3 compression Fx.  9.	Liver Cyst         PLAN:    ·	Pt not bradycardiac anymore.   ·	Cont Eliquis to 5 mg po q 12h  ·	TSH level is 1.7  ·	Neurosurgery eval noted. No neurosurgical intervention.  ·	Liver cyst size has decreased since 2018. Out pt F/U with GI  ·	Can transfer him to tele  ·	Anticipate D/C in AM
Yes
None available
Yes
None available
Yes

## 2019-05-10 ENCOUNTER — EMERGENCY (EMERGENCY)
Facility: HOSPITAL | Age: 59
LOS: 1 days | Discharge: HOME | End: 2019-05-10
Admitting: EMERGENCY MEDICINE
Payer: MEDICARE

## 2019-05-10 VITALS
OXYGEN SATURATION: 100 % | DIASTOLIC BLOOD PRESSURE: 70 MMHG | SYSTOLIC BLOOD PRESSURE: 119 MMHG | HEART RATE: 71 BPM | RESPIRATION RATE: 18 BRPM

## 2019-05-10 VITALS
DIASTOLIC BLOOD PRESSURE: 74 MMHG | TEMPERATURE: 98 F | SYSTOLIC BLOOD PRESSURE: 123 MMHG | OXYGEN SATURATION: 100 % | RESPIRATION RATE: 16 BRPM | HEART RATE: 72 BPM

## 2019-05-10 DIAGNOSIS — R00.2 PALPITATIONS: ICD-10-CM

## 2019-05-10 DIAGNOSIS — R07.89 OTHER CHEST PAIN: ICD-10-CM

## 2019-05-10 DIAGNOSIS — Z79.899 OTHER LONG TERM (CURRENT) DRUG THERAPY: ICD-10-CM

## 2019-05-10 DIAGNOSIS — E11.9 TYPE 2 DIABETES MELLITUS WITHOUT COMPLICATIONS: ICD-10-CM

## 2019-05-10 DIAGNOSIS — R42 DIZZINESS AND GIDDINESS: ICD-10-CM

## 2019-05-10 DIAGNOSIS — R11.0 NAUSEA: ICD-10-CM

## 2019-05-10 DIAGNOSIS — Z79.02 LONG TERM (CURRENT) USE OF ANTITHROMBOTICS/ANTIPLATELETS: ICD-10-CM

## 2019-05-10 DIAGNOSIS — R53.1 WEAKNESS: ICD-10-CM

## 2019-05-10 DIAGNOSIS — E78.5 HYPERLIPIDEMIA, UNSPECIFIED: ICD-10-CM

## 2019-05-10 DIAGNOSIS — Z79.82 LONG TERM (CURRENT) USE OF ASPIRIN: ICD-10-CM

## 2019-05-10 DIAGNOSIS — Z79.84 LONG TERM (CURRENT) USE OF ORAL HYPOGLYCEMIC DRUGS: ICD-10-CM

## 2019-05-10 DIAGNOSIS — R68.83 CHILLS (WITHOUT FEVER): ICD-10-CM

## 2019-05-10 LAB
ALBUMIN SERPL ELPH-MCNC: 3.4 G/DL — SIGNIFICANT CHANGE UP (ref 3.4–5)
ALP SERPL-CCNC: 92 U/L — SIGNIFICANT CHANGE UP (ref 40–120)
ALT FLD-CCNC: 23 U/L — SIGNIFICANT CHANGE UP (ref 12–42)
ANION GAP SERPL CALC-SCNC: 4 MMOL/L — LOW (ref 9–16)
APTT BLD: 24 SEC — LOW (ref 27.5–36.3)
AST SERPL-CCNC: 27 U/L — SIGNIFICANT CHANGE UP (ref 15–37)
BILIRUB SERPL-MCNC: 0.4 MG/DL — SIGNIFICANT CHANGE UP (ref 0.2–1.2)
BUN SERPL-MCNC: 15 MG/DL — SIGNIFICANT CHANGE UP (ref 7–23)
CALCIUM SERPL-MCNC: 9.5 MG/DL — SIGNIFICANT CHANGE UP (ref 8.5–10.5)
CHLORIDE SERPL-SCNC: 108 MMOL/L — SIGNIFICANT CHANGE UP (ref 96–108)
CK MB BLD-MCNC: 0.9 % — SIGNIFICANT CHANGE UP
CK MB CFR SERPL CALC: 0.8 NG/ML — SIGNIFICANT CHANGE UP (ref 0.5–3.6)
CK SERPL-CCNC: 89 U/L — SIGNIFICANT CHANGE UP (ref 39–308)
CO2 SERPL-SCNC: 29 MMOL/L — SIGNIFICANT CHANGE UP (ref 22–31)
CREAT SERPL-MCNC: 0.89 MG/DL — SIGNIFICANT CHANGE UP (ref 0.5–1.3)
ETHANOL SERPL-MCNC: <3 MG/DL — SIGNIFICANT CHANGE UP
GLUCOSE SERPL-MCNC: 91 MG/DL — SIGNIFICANT CHANGE UP (ref 70–99)
HCT VFR BLD CALC: 36.8 % — LOW (ref 39–50)
HGB BLD-MCNC: 12.3 G/DL — LOW (ref 13–17)
INR BLD: 1.06 — SIGNIFICANT CHANGE UP (ref 0.88–1.16)
MAGNESIUM SERPL-MCNC: 2 MG/DL — SIGNIFICANT CHANGE UP (ref 1.6–2.6)
MCHC RBC-ENTMCNC: 28 PG — SIGNIFICANT CHANGE UP (ref 27–34)
MCHC RBC-ENTMCNC: 33.4 G/DL — SIGNIFICANT CHANGE UP (ref 32–36)
MCV RBC AUTO: 83.6 FL — SIGNIFICANT CHANGE UP (ref 80–100)
PLATELET # BLD AUTO: 182 K/UL — SIGNIFICANT CHANGE UP (ref 150–400)
POTASSIUM SERPL-MCNC: 4.6 MMOL/L — SIGNIFICANT CHANGE UP (ref 3.5–5.3)
POTASSIUM SERPL-SCNC: 4.6 MMOL/L — SIGNIFICANT CHANGE UP (ref 3.5–5.3)
PROT SERPL-MCNC: 8.1 G/DL — SIGNIFICANT CHANGE UP (ref 6.4–8.2)
PROTHROM AB SERPL-ACNC: 11.8 SEC — SIGNIFICANT CHANGE UP (ref 10–12.9)
RBC # BLD: 4.4 M/UL — SIGNIFICANT CHANGE UP (ref 4.2–5.8)
RBC # FLD: 15.9 % — HIGH (ref 10.3–14.5)
SODIUM SERPL-SCNC: 141 MMOL/L — SIGNIFICANT CHANGE UP (ref 132–145)
TROPONIN I SERPL-MCNC: <0.017 NG/ML — LOW (ref 0.02–0.06)
TROPONIN I SERPL-MCNC: <0.017 NG/ML — LOW (ref 0.02–0.06)
WBC # BLD: 4.5 K/UL — SIGNIFICANT CHANGE UP (ref 3.8–10.5)
WBC # FLD AUTO: 4.5 K/UL — SIGNIFICANT CHANGE UP (ref 3.8–10.5)

## 2019-05-10 PROCEDURE — 71046 X-RAY EXAM CHEST 2 VIEWS: CPT | Mod: 26

## 2019-05-10 PROCEDURE — 99285 EMERGENCY DEPT VISIT HI MDM: CPT | Mod: 25

## 2019-05-10 PROCEDURE — 93010 ELECTROCARDIOGRAM REPORT: CPT

## 2019-05-10 NOTE — ED PROVIDER NOTE - CLINICAL SUMMARY MEDICAL DECISION MAKING FREE TEXT BOX
pt. with dizziness, vss, exam nl, no neuro deficits, labs wnl, ekg, trops X 1 nl,  pt. sx improved while in er. stable for d/c.

## 2019-05-10 NOTE — ED ADULT NURSE NOTE - NSIMPLEMENTINTERV_GEN_ALL_ED
Implemented All Universal Safety Interventions:  [place] to call system. Call bell, personal items and telephone within reach. Instruct patient to call for assistance. Room bathroom lighting operational. Non-slip footwear when patient is off stretcher. Physically safe environment: no spills, clutter or unnecessary equipment. Stretcher in lowest position, wheels locked, appropriate side rails in place.

## 2019-05-10 NOTE — ED PROVIDER NOTE - OBJECTIVE STATEMENT
59 y/o male with PMHx of HIV (on Genvoya, vl undetectable, last CD4 count in 500s), HTN, diabetes, HLD, afib (on aspirin and Plavix, DVTs in the legs, and schizophrenia presents to ED c/o dizziness. Patient reports he was asymptomatic when he woke up this morning, states he ate cereal, applesauce, and apple and orange juice this morning and went to work. Reports he was sitting at work when he experienced sudden dizziness/lightheadedness, palpitations (rapid heart beat), nausea, chills, generalized weakness, and chest pressure. Denies any SOB, vomiting, or fever. Report symptoms were ongoing for 2 hours, was brought by EMS to ED afterwards. Patient denies any difficulty urinating. States he currently feels improved from before. Denies hx of MI. Patient is on Oxycodone TID for chronic back pain, last took this morning.

## 2019-10-11 ENCOUNTER — EMERGENCY (EMERGENCY)
Facility: HOSPITAL | Age: 59
LOS: 1 days | Discharge: HOME | End: 2019-10-11
Attending: EMERGENCY MEDICINE | Admitting: EMERGENCY MEDICINE
Payer: MEDICARE

## 2019-10-11 VITALS
WEIGHT: 184.97 LBS | SYSTOLIC BLOOD PRESSURE: 139 MMHG | HEART RATE: 99 BPM | HEIGHT: 75 IN | TEMPERATURE: 98 F | OXYGEN SATURATION: 100 % | RESPIRATION RATE: 20 BRPM | DIASTOLIC BLOOD PRESSURE: 79 MMHG

## 2019-10-11 VITALS
OXYGEN SATURATION: 100 % | RESPIRATION RATE: 18 BRPM | DIASTOLIC BLOOD PRESSURE: 86 MMHG | HEART RATE: 86 BPM | SYSTOLIC BLOOD PRESSURE: 141 MMHG

## 2019-10-11 DIAGNOSIS — Q43.3 CONGENITAL MALFORMATIONS OF INTESTINAL FIXATION: Chronic | ICD-10-CM

## 2019-10-11 LAB
ALBUMIN SERPL ELPH-MCNC: 3.9 G/DL — SIGNIFICANT CHANGE UP (ref 3.3–5)
ALP SERPL-CCNC: 60 U/L — SIGNIFICANT CHANGE UP (ref 40–120)
ALT FLD-CCNC: 20 U/L — SIGNIFICANT CHANGE UP (ref 10–45)
ANION GAP SERPL CALC-SCNC: 6 MMOL/L — SIGNIFICANT CHANGE UP (ref 5–17)
APPEARANCE UR: CLEAR — SIGNIFICANT CHANGE UP
APTT BLD: 26.3 SEC — LOW (ref 27.5–36.3)
AST SERPL-CCNC: 18 U/L — SIGNIFICANT CHANGE UP (ref 10–40)
BACTERIA # UR AUTO: ABNORMAL /HPF
BASOPHILS # BLD AUTO: 0.01 K/UL — SIGNIFICANT CHANGE UP (ref 0–0.2)
BASOPHILS NFR BLD AUTO: 0.2 % — SIGNIFICANT CHANGE UP (ref 0–2)
BILIRUB SERPL-MCNC: 0.2 MG/DL — SIGNIFICANT CHANGE UP (ref 0.2–1.2)
BILIRUB UR-MCNC: NEGATIVE — SIGNIFICANT CHANGE UP
BUN SERPL-MCNC: 15 MG/DL — SIGNIFICANT CHANGE UP (ref 7–23)
CALCIUM SERPL-MCNC: 9.1 MG/DL — SIGNIFICANT CHANGE UP (ref 8.4–10.5)
CHLORIDE SERPL-SCNC: 104 MMOL/L — SIGNIFICANT CHANGE UP (ref 96–108)
CO2 SERPL-SCNC: 30 MMOL/L — SIGNIFICANT CHANGE UP (ref 22–31)
COLOR SPEC: YELLOW — SIGNIFICANT CHANGE UP
COMMENT - URINE: SIGNIFICANT CHANGE UP
CREAT SERPL-MCNC: 0.83 MG/DL — SIGNIFICANT CHANGE UP (ref 0.5–1.3)
DIFF PNL FLD: ABNORMAL
EOSINOPHIL # BLD AUTO: 0.07 K/UL — SIGNIFICANT CHANGE UP (ref 0–0.5)
EOSINOPHIL NFR BLD AUTO: 1.3 % — SIGNIFICANT CHANGE UP (ref 0–6)
EPI CELLS # UR: SIGNIFICANT CHANGE UP /HPF (ref 0–5)
GLUCOSE SERPL-MCNC: 100 MG/DL — HIGH (ref 70–99)
GLUCOSE UR QL: NEGATIVE — SIGNIFICANT CHANGE UP
HCT VFR BLD CALC: 35.2 % — LOW (ref 39–50)
HGB BLD-MCNC: 11.2 G/DL — LOW (ref 13–17)
IMM GRANULOCYTES NFR BLD AUTO: 0.2 % — SIGNIFICANT CHANGE UP (ref 0–1.5)
INR BLD: 0.99 — SIGNIFICANT CHANGE UP (ref 0.88–1.16)
KETONES UR-MCNC: NEGATIVE — SIGNIFICANT CHANGE UP
LACTATE SERPL-SCNC: 1 MMOL/L — SIGNIFICANT CHANGE UP (ref 0.5–2)
LEUKOCYTE ESTERASE UR-ACNC: NEGATIVE — SIGNIFICANT CHANGE UP
LIDOCAIN IGE QN: 26 U/L — SIGNIFICANT CHANGE UP (ref 7–60)
LYMPHOCYTES # BLD AUTO: 1.19 K/UL — SIGNIFICANT CHANGE UP (ref 1–3.3)
LYMPHOCYTES # BLD AUTO: 22.2 % — SIGNIFICANT CHANGE UP (ref 13–44)
MCHC RBC-ENTMCNC: 28.5 PG — SIGNIFICANT CHANGE UP (ref 27–34)
MCHC RBC-ENTMCNC: 31.8 GM/DL — LOW (ref 32–36)
MCV RBC AUTO: 89.6 FL — SIGNIFICANT CHANGE UP (ref 80–100)
MONOCYTES # BLD AUTO: 0.67 K/UL — SIGNIFICANT CHANGE UP (ref 0–0.9)
MONOCYTES NFR BLD AUTO: 12.5 % — SIGNIFICANT CHANGE UP (ref 2–14)
NEUTROPHILS # BLD AUTO: 3.4 K/UL — SIGNIFICANT CHANGE UP (ref 1.8–7.4)
NEUTROPHILS NFR BLD AUTO: 63.6 % — SIGNIFICANT CHANGE UP (ref 43–77)
NITRITE UR-MCNC: POSITIVE
NRBC # BLD: 0 /100 WBCS — SIGNIFICANT CHANGE UP (ref 0–0)
PH UR: 7 — SIGNIFICANT CHANGE UP (ref 5–8)
PLATELET # BLD AUTO: 167 K/UL — SIGNIFICANT CHANGE UP (ref 150–400)
POTASSIUM SERPL-MCNC: 4.2 MMOL/L — SIGNIFICANT CHANGE UP (ref 3.5–5.3)
POTASSIUM SERPL-SCNC: 4.2 MMOL/L — SIGNIFICANT CHANGE UP (ref 3.5–5.3)
PROT SERPL-MCNC: 7.1 G/DL — SIGNIFICANT CHANGE UP (ref 6–8.3)
PROT UR-MCNC: NEGATIVE MG/DL — SIGNIFICANT CHANGE UP
PROTHROM AB SERPL-ACNC: 11.2 SEC — SIGNIFICANT CHANGE UP (ref 10–12.9)
RBC # BLD: 3.93 M/UL — LOW (ref 4.2–5.8)
RBC # FLD: 16.4 % — HIGH (ref 10.3–14.5)
RBC CASTS # UR COMP ASSIST: < 5 /HPF — SIGNIFICANT CHANGE UP
SODIUM SERPL-SCNC: 140 MMOL/L — SIGNIFICANT CHANGE UP (ref 135–145)
SP GR SPEC: 1.01 — SIGNIFICANT CHANGE UP (ref 1–1.03)
UROBILINOGEN FLD QL: 0.2 E.U./DL — SIGNIFICANT CHANGE UP
WBC # BLD: 5.35 K/UL — SIGNIFICANT CHANGE UP (ref 3.8–10.5)
WBC # FLD AUTO: 5.35 K/UL — SIGNIFICANT CHANGE UP (ref 3.8–10.5)
WBC UR QL: > 10 /HPF

## 2019-10-11 PROCEDURE — 87086 URINE CULTURE/COLONY COUNT: CPT

## 2019-10-11 PROCEDURE — 99284 EMERGENCY DEPT VISIT MOD MDM: CPT

## 2019-10-11 PROCEDURE — 81001 URINALYSIS AUTO W/SCOPE: CPT

## 2019-10-11 PROCEDURE — 74177 CT ABD & PELVIS W/CONTRAST: CPT

## 2019-10-11 PROCEDURE — 36415 COLL VENOUS BLD VENIPUNCTURE: CPT

## 2019-10-11 PROCEDURE — 87186 SC STD MICRODIL/AGAR DIL: CPT

## 2019-10-11 PROCEDURE — 80053 COMPREHEN METABOLIC PANEL: CPT

## 2019-10-11 PROCEDURE — 83690 ASSAY OF LIPASE: CPT

## 2019-10-11 PROCEDURE — 93010 ELECTROCARDIOGRAM REPORT: CPT

## 2019-10-11 PROCEDURE — 96375 TX/PRO/DX INJ NEW DRUG ADDON: CPT

## 2019-10-11 PROCEDURE — 96374 THER/PROPH/DIAG INJ IV PUSH: CPT | Mod: XU

## 2019-10-11 PROCEDURE — 83605 ASSAY OF LACTIC ACID: CPT

## 2019-10-11 PROCEDURE — 85610 PROTHROMBIN TIME: CPT

## 2019-10-11 PROCEDURE — 85025 COMPLETE CBC W/AUTO DIFF WBC: CPT

## 2019-10-11 PROCEDURE — 74177 CT ABD & PELVIS W/CONTRAST: CPT | Mod: 26

## 2019-10-11 PROCEDURE — 99284 EMERGENCY DEPT VISIT MOD MDM: CPT | Mod: 25

## 2019-10-11 PROCEDURE — 85730 THROMBOPLASTIN TIME PARTIAL: CPT

## 2019-10-11 PROCEDURE — 93005 ELECTROCARDIOGRAM TRACING: CPT

## 2019-10-11 RX ORDER — FAMOTIDINE 10 MG/ML
20 INJECTION INTRAVENOUS ONCE
Refills: 0 | Status: COMPLETED | OUTPATIENT
Start: 2019-10-11 | End: 2019-10-11

## 2019-10-11 RX ORDER — ONDANSETRON 8 MG/1
4 TABLET, FILM COATED ORAL ONCE
Refills: 0 | Status: COMPLETED | OUTPATIENT
Start: 2019-10-11 | End: 2019-10-11

## 2019-10-11 RX ORDER — SODIUM CHLORIDE 9 MG/ML
1000 INJECTION INTRAMUSCULAR; INTRAVENOUS; SUBCUTANEOUS ONCE
Refills: 0 | Status: COMPLETED | OUTPATIENT
Start: 2019-10-11 | End: 2019-10-11

## 2019-10-11 RX ORDER — CEFTRIAXONE 500 MG/1
1000 INJECTION, POWDER, FOR SOLUTION INTRAMUSCULAR; INTRAVENOUS ONCE
Refills: 0 | Status: COMPLETED | OUTPATIENT
Start: 2019-10-11 | End: 2019-10-11

## 2019-10-11 RX ORDER — NITROFURANTOIN MACROCRYSTAL 50 MG
1 CAPSULE ORAL
Qty: 14 | Refills: 0
Start: 2019-10-11 | End: 2019-10-17

## 2019-10-11 RX ORDER — CLOPIDOGREL BISULFATE 75 MG/1
1 TABLET, FILM COATED ORAL
Qty: 14 | Refills: 0
Start: 2019-10-11 | End: 2019-10-24

## 2019-10-11 RX ORDER — CLOPIDOGREL BISULFATE 75 MG/1
75 TABLET, FILM COATED ORAL ONCE
Refills: 0 | Status: COMPLETED | OUTPATIENT
Start: 2019-10-11 | End: 2019-10-11

## 2019-10-11 RX ORDER — IOHEXOL 300 MG/ML
30 INJECTION, SOLUTION INTRAVENOUS ONCE
Refills: 0 | Status: COMPLETED | OUTPATIENT
Start: 2019-10-11 | End: 2019-10-11

## 2019-10-11 RX ORDER — CEFTRIAXONE 500 MG/1
1000 INJECTION, POWDER, FOR SOLUTION INTRAMUSCULAR; INTRAVENOUS ONCE
Refills: 0 | Status: DISCONTINUED | OUTPATIENT
Start: 2019-10-11 | End: 2019-10-11

## 2019-10-11 RX ORDER — NITROFURANTOIN MACROCRYSTAL 50 MG
100 CAPSULE ORAL ONCE
Refills: 0 | Status: COMPLETED | OUTPATIENT
Start: 2019-10-11 | End: 2019-10-11

## 2019-10-11 RX ADMIN — IOHEXOL 30 MILLILITER(S): 300 INJECTION, SOLUTION INTRAVENOUS at 11:10

## 2019-10-11 RX ADMIN — Medication 100 MILLIGRAM(S): at 16:45

## 2019-10-11 RX ADMIN — CLOPIDOGREL BISULFATE 75 MILLIGRAM(S): 75 TABLET, FILM COATED ORAL at 17:20

## 2019-10-11 RX ADMIN — FAMOTIDINE 20 MILLIGRAM(S): 10 INJECTION INTRAVENOUS at 11:10

## 2019-10-11 RX ADMIN — ONDANSETRON 4 MILLIGRAM(S): 8 TABLET, FILM COATED ORAL at 11:10

## 2019-10-11 RX ADMIN — CEFTRIAXONE 100 MILLIGRAM(S): 500 INJECTION, POWDER, FOR SOLUTION INTRAMUSCULAR; INTRAVENOUS at 15:15

## 2019-10-11 RX ADMIN — SODIUM CHLORIDE 1000 MILLILITER(S): 9 INJECTION INTRAMUSCULAR; INTRAVENOUS; SUBCUTANEOUS at 11:10

## 2019-10-11 NOTE — ED PROVIDER NOTE - OBJECTIVE STATEMENT
59 y/o M with a PMHx of HIV on antiviral medication with a CD4 count of 575, peripheral vascular disease, HTN, HLD, and DM presents to the ED with multiple c/o dizziness, weakness, vomiting, and abdominal pain for one day. Pt says that he was at [facility] visiting his father who was gravely ill. The pt then started to feel unwell and visited the ED at [facility]. [facility] performed blood work, CT head, and gave him medication but he did not get a CAT scan of his abdomen there. The hospital wanted him to be further evaluated but he left AMA since he states " It was too busy there" so the patient is now here at [facility] ED with c/o nausea, vomiting, and abdominal pain. Pt reports difficult moving bowels since yesterday and denies fevers, chills, and urinary symptoms.  When he was 8 y/o he underwent abdominal surgery (mal rotation). CT head at [facility] looked normal.

## 2019-10-11 NOTE — ED ADULT TRIAGE NOTE - CHIEF COMPLAINT QUOTE
pt c/o generalized abdominal pain for a few days, n/v. denies diarrhea. pt reports signing AMA from [facility]. [facility] HIV.

## 2019-10-11 NOTE — ED PROVIDER NOTE - PMH
Afib    Allergy    Bipolar 1 disorder    Deep vein thrombosis of both lower extremities    DM (diabetes mellitus)    DM (diabetes mellitus)    HIV (human immunodeficiency virus infection)    HIV disease    HLD (hyperlipidemia)    HTN (hypertension)    PAF (paroxysmal atrial fibrillation)    Paranoid schizophrenia    Schizophrenia

## 2019-10-11 NOTE — CONSULT NOTE ADULT - ASSESSMENT
Assessment: 58M PMH HIV (undetectable viral load), atrial fibrillation, DVT, PAD s/p bilateral lower extremity stent placement, HTN, DMII, schizoaffective disorder, cocaine use, malrotation s/p operative repair at age 9, presents with multiple complaints in the setting of chronic constipation and an active UTI. Patient is having bowel function and contrast passes the questionable points of obstruction, suggesting that there is no evidence of small bowel obstruction. Patient's abdominal pain is likely due to the chronic constipation and active UTI.    Recommendations:  - Aggressive bowel regimen  - Treatment of UTI  - No general surgery intervention at this time  - Case discussed with Dr. Vera

## 2019-10-11 NOTE — ED ADULT NURSE NOTE - CHIEF COMPLAINT QUOTE
pt c/o generalized abdominal pain for a few days, n/v. denies diarrhea. pt reports signing AMA from [facility]. [facility] HIV.

## 2019-10-11 NOTE — ED ADULT TRIAGE NOTE - BMI (KG/M2)
23.1 Cheek Interpolation Flap Text: A decision was made to reconstruct the defect utilizing an interpolation axial flap and a staged reconstruction.  A telfa template was made of the defect.  This telfa template was then used to outline the Cheek Interpolation flap.  The donor area for the pedicle flap was then injected with anesthesia.  The flap was excised through the skin and subcutaneous tissue down to the layer of the underlying musculature.  The interpolation flap was carefully excised within this deep plane to maintain its blood supply.  The edges of the donor site were undermined.   The donor site was closed in a primary fashion.  The pedicle was then rotated into position and sutured.  Once the tube was sutured into place, adequate blood supply was confirmed with blanching and refill.  The pedicle was then wrapped with xeroform gauze and dressed appropriately with a telfa and gauze bandage to ensure continued blood supply and protect the attached pedicle.

## 2019-10-11 NOTE — ED PROVIDER NOTE - PATIENT PORTAL LINK FT
You can access the FollowMyHealth Patient Portal offered by [facility] by registering at the following website: [email or web address]. By joining [facility]’s FollowMyHealth portal, you will also be able to view your health information using other applications (apps) compatible with our system.

## 2019-10-11 NOTE — ED ADULT NURSE NOTE - CHPI ED NUR SYMPTOMS NEG
no burning urination/no chills/no fever/no abdominal distension/no blood in stool/no dysuria/no hematuria/no diarrhea

## 2019-10-11 NOTE — CONSULT NOTE ADULT - ATTENDING COMMENTS
No clinical signs or symptoms of bowel obstruction.  PO contrast in colon.  No nausea or vomiting.   No abdominal distention.  Chronic constipation.  UTI.  Recommend aggressive bowel regimen. GI evaluation (outpatient)  treat UTI, which can affect GI tract motility.  Please recall surgery as needed.

## 2019-10-11 NOTE — ED PROVIDER NOTE - CLINICAL SUMMARY MEDICAL DECISION MAKING FREE TEXT BOX
59 y/o M with a PMHx of HIV and a PSHx of malrotation presents with dizziness, weakness, vomiting, and abdominal pain. Will get labs. Will get medication for nausea and abdominal pain. CT abdomen and pelvis. 57 y/o M with a PMHx of HIV and a PSHx of malrotation presents with dizziness, weakness, vomiting, and abdominal pain. Will get labs. Will get medication for nausea and abdominal pain. CT abdomen and pelvis.  CT shows malrotation with ? small bowel obstruction.  Seen by surgery who feels no obstruction as contrast goes through to colon.  Ok for d/c with antibiotics for UTI.

## 2019-10-11 NOTE — CONSULT NOTE ADULT - SUBJECTIVE AND OBJECTIVE BOX
HPI: 58M PMH HIV (undetectable viral load), atrial fibrillation, DVT, PAD s/p bilateral lower extremity stent placement, HTN, DMII, schizoaffective disorder, cocaine use, malrotation s/p operative repair at age 9, presents with 1 day of lightheadedness and weakness. He also had episodes of postprandial NB/NB emesis yesterday, but has been tolerating PO today. His last flatus was today, and is unsure of his last bowel movement. He has chronic constipation and usually has 1 bowel movement per week, and doesn't take any bowel regimen. He has had chills today, but no fevers. He has had sharp RUQ postprandial pain for months. He has also had dysuria for months.      PAST MEDICAL & SURGICAL HISTORY:  Afib  HLD (hyperlipidemia)  HTN (hypertension)  DM (diabetes mellitus)  HIV (human immunodeficiency virus infection)  Deep vein thrombosis of both lower extremities  Paranoid schizophrenia  PAF (paroxysmal atrial fibrillation)  Allergy  Bipolar 1 disorder  DM (diabetes mellitus)  HIV disease  S/P laparotomy      FAMILY HISTORY:  No pertinent family history in first degree relatives  No pertinent family history in first degree relatives      MEDICATIONS:  Genvoya  Depakote 500mg BID  Gabapentin 300mg TID  Plavix 75mg QD  ASA 81mg  Metoprolol 50mg QD  Metformin 500mg BID      Allergies  No Known Allergies          OBJECTIVE:  Vital Signs Last 24 Hrs  T(C): 36.6 (11 Oct 2019 09:26), Max: 36.6 (11 Oct 2019 09:26)  T(F): 97.9 (11 Oct 2019 09:26), Max: 97.9 (11 Oct 2019 09:26)  HR: 86 (11 Oct 2019 11:10) (86 - 99)  BP: 141/86 (11 Oct 2019 11:10) (139/79 - 141/86)  BP(mean): --  RR: 18 (11 Oct 2019 11:10) (18 - 20)  SpO2: 100% (11 Oct 2019 11:10) (100% - 100%)    I&O's Summary    11 Oct 2019 07:01  -  11 Oct 2019 16:14  --------------------------------------------------------  IN: 0 mL / OUT: 800 mL / NET: -800 mL        Physical Exam:  General: NAD, resting comfortably  Respiratory: No accessory muscle use  Abdominal: Soft, tender to light palpation in RUQ and deep palpation in all quadrants, distractible pain, no rebound tenderness, distended      LABS:                        11.2   5.35  )-----------( 167      ( 11 Oct 2019 10:47 )             35.2     10-11    140  |  104  |  15  ----------------------------<  100<H>  4.2   |  30  |  0.83    Ca    9.1      11 Oct 2019 10:47    TPro  7.1  /  Alb  3.9  /  TBili  0.2  /  DBili  x   /  AST  18  /  ALT  20  /  AlkPhos  60  10-11    PT/INR - ( 11 Oct 2019 10:47 )   PT: 11.2 sec;   INR: 0.99          PTT - ( 11 Oct 2019 10:47 )  PTT:26.3 sec  Urinalysis Basic - ( 11 Oct 2019 11:46 )    Color: Yellow / Appearance: Clear / S.015 / pH: x  Gluc: x / Ketone: NEGATIVE  / Bili: Negative / Urobili: 0.2 E.U./dL   Blood: x / Protein: NEGATIVE mg/dL / Nitrite: POSITIVE   Leuk Esterase: NEGATIVE / RBC: < 5 /HPF / WBC > 10 /HPF   Sq Epi: x / Non Sq Epi: 0-5 /HPF / Bacteria: Many /HPF      CAPILLARY BLOOD GLUCOSE        LIVER FUNCTIONS - ( 11 Oct 2019 10:47 )  Alb: 3.9 g/dL / Pro: 7.1 g/dL / ALK PHOS: 60 U/L / ALT: 20 U/L / AST: 18 U/L / GGT: x             CT A/P W/ PO AND IV CONTRAST  Lower chest: There are two subcentimeter air cysts in the left lower   lobe.     Liver:  Normal.    Gallbladder: No radiopaque stones gallbladder.    Spleen:  Normal.    Pancreas:  Normal.    Adrenal glands:  Normal.    Kidneys: Scarring lateral aspect mid pole left kidney. 0.8 cm cyst right   kidney.    Adenopathy:  No lymphadenopathy in abdomen or pelvis.    Ascites: None.    Gastrointestinal tract: Small hiatal hernia. There is intestinal   malrotation, with the colon predominantly in the left abdomen and small   bowel predominantly in the right abdomen. There are a few loops of mildly   dilated small bowel in the right abdomen and right lower quadrant, with   intervening areas of narrow/normal caliber small bowel. Contrast passes   through the small bowel to the cecum. These findings could represent a   low grade partial small bowel obstruction due to Oracio bands. No evidence   of appendicitis, though appendix not visualized. Large amount of stool   throughout colon.    Vessels: Moderate amount of calcified plaque aorta and pelvic arteries.   Circumaortic left renal vein.    Pelvic organs: Artifact from bilateral total hip replacements limits   evaluation of the pelvis. There is mild diffuse bladder wall thickening.   The prostate appears to be of normal size.    Soft tissues: Midline upper abdominal surgical sutures are present.    Bones: Bilateral total hip replacements.    Impression: 1. Findings consistent with intestinal malrotation.    2. There are a few loops of mildly dilated small bowel in the right mid   abdomen and right lower quadrant, with intervening areas of narrow/normal   caliber small bowel. Contrast passes into colon. These findings could   represent a low grade partial small bowel obstruction due to Oracio bands.    3. Mild diffuse bladder wall thickening. This could be due to cystitis or   chronic bladder obstruction. Recommend clinical correlation. Consider   ultrasound evaluation.

## 2019-10-11 NOTE — ED ADULT NURSE NOTE - OBJECTIVE STATEMENT
58 year old male arrived to the ED alert and oriented x 3 for abd pain. Pt stated "I vomited prior to coming here, my complaint is abd pain, dizziness and weakness" pt is alert and oriented x 3 denies Chest pain, SOB, palpitations, fever, diarrhea  nor any other complaints. PIV to left AC, labs drawn and sent, medications given. Pt will continue to be monitored and reassessed

## 2019-10-11 NOTE — ED PROVIDER NOTE - NSFOLLOWUPINSTRUCTIONS_ED_ALL_ED_FT
Take antibiotics as prescribed.    Follow up with your PMD.    Return to ED with worsening symptoms or other concerns.    URINARY TRACT INFECTION IN MEN - Discharge Care     Urinary Tract Infection in Men    WHAT YOU NEED TO KNOW:    A urinary tract infection (UTI) is caused by bacteria that get inside your urinary tract. Most bacteria that enter your urinary tract come out when you urinate. If the bacteria stay in your urinary tract, you may get an infection. Your urinary tract includes your kidneys, ureters, bladder, and urethra. Urine is made in your kidneys, and it flows from the ureters to the bladder. Urine leaves the bladder through the urethra. A UTI is more common in your lower urinary tract, which includes your bladder and urethra.          DISCHARGE INSTRUCTIONS:    Seek care immediately if:     You are urinating very little or not at all.      You have a high fever with shaking chills.       You have side or back pain that gets worse.    Contact your healthcare provider if:     You have a mild fever.      You do not feel better after 2 days of taking antibiotics.      You are vomiting.       You have new symptoms, such as blood or pus in your urine.       You have questions or concerns about your condition or care.    Medicines:     Antibiotics help fight a bacterial infection.       Medicines may be given to decrease pain and burning when you urinate. They will also help decrease the feeling that you need to urinate often. These medicines will make your urine orange or red.      Take your medicine as directed. Contact your healthcare provider if you think your medicine is not helping or if you have side effects. Tell him or her if you are allergic to any medicine. Keep a list of the medicines, vitamins, and herbs you take. Include the amounts, and when and why you take them. Bring the list or the pill bottles to follow-up visits. Carry your medicine list with you in case of an emergency.    Prevent another UTI:     Empty your bladder often. Urinate and empty your bladder as soon as you feel the need. Do not hold your urine for long periods of time.      Drink liquids as directed. Ask how much liquid to drink each day and which liquids are best for you. You may need to drink more liquids than usual to help flush out the bacteria. Do not drink alcohol, caffeine, or citrus juices. These can irritate your bladder and increase your symptoms. Your healthcare provider may recommend cranberry juice to help prevent a UTI.      Urinate after you have sex. This can help flush out bacteria passed during sex.      Do pelvic muscle exercises often. Pelvic muscle exercises may help you start and stop urinating. Strong pelvic muscles may help you empty your bladder easier. Squeeze these muscles tightly for 5 seconds like you are trying to hold back urine. Then relax for 5 seconds. Gradually work up to squeezing for 10 seconds. Do 3 sets of 15 repetitions a day, or as directed.    Follow up with your healthcare provider as directed: Write down your questions so you remember to ask them during your visits.        © Copyright [employer] 2019 All illustrations and images included in CareNotes are the copyrighted property of [employer] or [employer].      back to top                      © Copyright [employer] 2019

## 2019-10-13 LAB
-  AMPICILLIN/SULBACTAM: SIGNIFICANT CHANGE UP
-  AMPICILLIN/SULBACTAM: SIGNIFICANT CHANGE UP
-  AMPICILLIN: SIGNIFICANT CHANGE UP
-  AMPICILLIN: SIGNIFICANT CHANGE UP
-  CEFAZOLIN: SIGNIFICANT CHANGE UP
-  CEFAZOLIN: SIGNIFICANT CHANGE UP
-  CEFTRIAXONE: SIGNIFICANT CHANGE UP
-  CEFTRIAXONE: SIGNIFICANT CHANGE UP
-  CIPROFLOXACIN: SIGNIFICANT CHANGE UP
-  CIPROFLOXACIN: SIGNIFICANT CHANGE UP
-  GENTAMICIN: SIGNIFICANT CHANGE UP
-  GENTAMICIN: SIGNIFICANT CHANGE UP
-  NITROFURANTOIN: SIGNIFICANT CHANGE UP
-  NITROFURANTOIN: SIGNIFICANT CHANGE UP
-  PIPERACILLIN/TAZOBACTAM: SIGNIFICANT CHANGE UP
-  PIPERACILLIN/TAZOBACTAM: SIGNIFICANT CHANGE UP
-  TOBRAMYCIN: SIGNIFICANT CHANGE UP
-  TOBRAMYCIN: SIGNIFICANT CHANGE UP
-  TRIMETHOPRIM/SULFAMETHOXAZOLE: SIGNIFICANT CHANGE UP
-  TRIMETHOPRIM/SULFAMETHOXAZOLE: SIGNIFICANT CHANGE UP
CULTURE RESULTS: SIGNIFICANT CHANGE UP
METHOD TYPE: SIGNIFICANT CHANGE UP
METHOD TYPE: SIGNIFICANT CHANGE UP
ORGANISM # SPEC MICROSCOPIC CNT: SIGNIFICANT CHANGE UP
SPECIMEN SOURCE: SIGNIFICANT CHANGE UP

## 2019-10-16 DIAGNOSIS — N39.0 URINARY TRACT INFECTION, SITE NOT SPECIFIED: ICD-10-CM

## 2019-10-16 DIAGNOSIS — R42 DIZZINESS AND GIDDINESS: ICD-10-CM

## 2020-01-06 NOTE — PROGRESS NOTE BEHAVIORAL HEALTH - NS ED BHA AXIS I SECONDARY1 CODE FT
F14.21
17
F14.21
F14.21
B20
F14.21

## 2020-03-06 ENCOUNTER — EMERGENCY (EMERGENCY)
Facility: HOSPITAL | Age: 60
LOS: 1 days | Discharge: HOME | End: 2020-03-06
Admitting: EMERGENCY MEDICINE
Payer: MEDICARE

## 2020-03-06 VITALS
SYSTOLIC BLOOD PRESSURE: 124 MMHG | TEMPERATURE: 99 F | OXYGEN SATURATION: 99 % | RESPIRATION RATE: 18 BRPM | HEART RATE: 67 BPM | DIASTOLIC BLOOD PRESSURE: 76 MMHG

## 2020-03-06 VITALS
WEIGHT: 176.37 LBS | OXYGEN SATURATION: 98 % | SYSTOLIC BLOOD PRESSURE: 122 MMHG | HEART RATE: 64 BPM | TEMPERATURE: 99 F | RESPIRATION RATE: 16 BRPM | HEIGHT: 69 IN | DIASTOLIC BLOOD PRESSURE: 72 MMHG

## 2020-03-06 DIAGNOSIS — Z79.82 LONG TERM (CURRENT) USE OF ASPIRIN: ICD-10-CM

## 2020-03-06 DIAGNOSIS — Z79.2 LONG TERM (CURRENT) USE OF ANTIBIOTICS: ICD-10-CM

## 2020-03-06 DIAGNOSIS — E11.9 TYPE 2 DIABETES MELLITUS WITHOUT COMPLICATIONS: ICD-10-CM

## 2020-03-06 DIAGNOSIS — I10 ESSENTIAL (PRIMARY) HYPERTENSION: ICD-10-CM

## 2020-03-06 DIAGNOSIS — B20 HUMAN IMMUNODEFICIENCY VIRUS [HIV] DISEASE: ICD-10-CM

## 2020-03-06 DIAGNOSIS — Q43.3 CONGENITAL MALFORMATIONS OF INTESTINAL FIXATION: Chronic | ICD-10-CM

## 2020-03-06 DIAGNOSIS — E78.5 HYPERLIPIDEMIA, UNSPECIFIED: ICD-10-CM

## 2020-03-06 DIAGNOSIS — Z79.899 OTHER LONG TERM (CURRENT) DRUG THERAPY: ICD-10-CM

## 2020-03-06 DIAGNOSIS — R55 SYNCOPE AND COLLAPSE: ICD-10-CM

## 2020-03-06 DIAGNOSIS — Z88.5 ALLERGY STATUS TO NARCOTIC AGENT: ICD-10-CM

## 2020-03-06 DIAGNOSIS — Z79.4 LONG TERM (CURRENT) USE OF INSULIN: ICD-10-CM

## 2020-03-06 PROBLEM — F20.9 SCHIZOPHRENIA, UNSPECIFIED: Chronic | Status: ACTIVE | Noted: 2019-10-11

## 2020-03-06 LAB
ALBUMIN SERPL ELPH-MCNC: 3.5 G/DL — SIGNIFICANT CHANGE UP (ref 3.4–5)
ALP SERPL-CCNC: 67 U/L — SIGNIFICANT CHANGE UP (ref 40–120)
ALT FLD-CCNC: 26 U/L — SIGNIFICANT CHANGE UP (ref 12–42)
ANION GAP SERPL CALC-SCNC: 7 MMOL/L — LOW (ref 9–16)
APPEARANCE UR: CLEAR — SIGNIFICANT CHANGE UP
AST SERPL-CCNC: 44 U/L — HIGH (ref 15–37)
BASOPHILS # BLD AUTO: 0.02 K/UL — SIGNIFICANT CHANGE UP (ref 0–0.2)
BASOPHILS NFR BLD AUTO: 0.5 % — SIGNIFICANT CHANGE UP (ref 0–2)
BILIRUB SERPL-MCNC: 0.4 MG/DL — SIGNIFICANT CHANGE UP (ref 0.2–1.2)
BILIRUB UR-MCNC: NEGATIVE — SIGNIFICANT CHANGE UP
BUN SERPL-MCNC: 10 MG/DL — SIGNIFICANT CHANGE UP (ref 7–23)
CALCIUM SERPL-MCNC: 9.2 MG/DL — SIGNIFICANT CHANGE UP (ref 8.5–10.5)
CHLORIDE SERPL-SCNC: 103 MMOL/L — SIGNIFICANT CHANGE UP (ref 96–108)
CO2 SERPL-SCNC: 27 MMOL/L — SIGNIFICANT CHANGE UP (ref 22–31)
COLOR SPEC: YELLOW — SIGNIFICANT CHANGE UP
CREAT SERPL-MCNC: 0.7 MG/DL — SIGNIFICANT CHANGE UP (ref 0.5–1.3)
DIFF PNL FLD: NEGATIVE — SIGNIFICANT CHANGE UP
EOSINOPHIL # BLD AUTO: 0.06 K/UL — SIGNIFICANT CHANGE UP (ref 0–0.5)
EOSINOPHIL NFR BLD AUTO: 1.5 % — SIGNIFICANT CHANGE UP (ref 0–6)
GLUCOSE SERPL-MCNC: 77 MG/DL — SIGNIFICANT CHANGE UP (ref 70–99)
GLUCOSE UR QL: NEGATIVE — SIGNIFICANT CHANGE UP
HCT VFR BLD CALC: 38.1 % — LOW (ref 39–50)
HGB BLD-MCNC: 12.5 G/DL — LOW (ref 13–17)
IMM GRANULOCYTES NFR BLD AUTO: 0.2 % — SIGNIFICANT CHANGE UP (ref 0–1.5)
KETONES UR-MCNC: NEGATIVE — SIGNIFICANT CHANGE UP
LEUKOCYTE ESTERASE UR-ACNC: ABNORMAL
LYMPHOCYTES # BLD AUTO: 1.5 K/UL — SIGNIFICANT CHANGE UP (ref 1–3.3)
LYMPHOCYTES # BLD AUTO: 36.3 % — SIGNIFICANT CHANGE UP (ref 13–44)
MAGNESIUM SERPL-MCNC: 1.9 MG/DL — SIGNIFICANT CHANGE UP (ref 1.6–2.6)
MCHC RBC-ENTMCNC: 26.7 PG — LOW (ref 27–34)
MCHC RBC-ENTMCNC: 32.8 GM/DL — SIGNIFICANT CHANGE UP (ref 32–36)
MCV RBC AUTO: 81.2 FL — SIGNIFICANT CHANGE UP (ref 80–100)
MONOCYTES # BLD AUTO: 0.55 K/UL — SIGNIFICANT CHANGE UP (ref 0–0.9)
MONOCYTES NFR BLD AUTO: 13.3 % — SIGNIFICANT CHANGE UP (ref 2–14)
NEUTROPHILS # BLD AUTO: 1.99 K/UL — SIGNIFICANT CHANGE UP (ref 1.8–7.4)
NEUTROPHILS NFR BLD AUTO: 48.2 % — SIGNIFICANT CHANGE UP (ref 43–77)
NITRITE UR-MCNC: POSITIVE
NRBC # BLD: 0 /100 WBCS — SIGNIFICANT CHANGE UP (ref 0–0)
NT-PROBNP SERPL-SCNC: 40 PG/ML — SIGNIFICANT CHANGE UP
PH UR: 7 — SIGNIFICANT CHANGE UP (ref 5–8)
PLATELET # BLD AUTO: 175 K/UL — SIGNIFICANT CHANGE UP (ref 150–400)
POTASSIUM SERPL-MCNC: 5.4 MMOL/L — HIGH (ref 3.5–5.3)
POTASSIUM SERPL-SCNC: 5.4 MMOL/L — HIGH (ref 3.5–5.3)
PROT SERPL-MCNC: 8.2 G/DL — SIGNIFICANT CHANGE UP (ref 6.4–8.2)
PROT UR-MCNC: NEGATIVE MG/DL — SIGNIFICANT CHANGE UP
RBC # BLD: 4.69 M/UL — SIGNIFICANT CHANGE UP (ref 4.2–5.8)
RBC # FLD: 19.1 % — HIGH (ref 10.3–14.5)
SODIUM SERPL-SCNC: 137 MMOL/L — SIGNIFICANT CHANGE UP (ref 132–145)
SP GR SPEC: 1.01 — SIGNIFICANT CHANGE UP (ref 1–1.03)
TROPONIN I SERPL-MCNC: <0.017 NG/ML — LOW (ref 0.02–0.06)
TROPONIN I SERPL-MCNC: <0.017 NG/ML — LOW (ref 0.02–0.06)
UROBILINOGEN FLD QL: 0.2 E.U./DL — SIGNIFICANT CHANGE UP
WBC # BLD: 4.13 K/UL — SIGNIFICANT CHANGE UP (ref 3.8–10.5)
WBC # FLD AUTO: 4.13 K/UL — SIGNIFICANT CHANGE UP (ref 3.8–10.5)

## 2020-03-06 PROCEDURE — 70450 CT HEAD/BRAIN W/O DYE: CPT | Mod: 26

## 2020-03-06 PROCEDURE — 99284 EMERGENCY DEPT VISIT MOD MDM: CPT

## 2020-03-06 PROCEDURE — 71046 X-RAY EXAM CHEST 2 VIEWS: CPT | Mod: 26

## 2020-03-06 RX ORDER — CEFTRIAXONE 500 MG/1
1000 INJECTION, POWDER, FOR SOLUTION INTRAMUSCULAR; INTRAVENOUS ONCE
Refills: 0 | Status: COMPLETED | OUTPATIENT
Start: 2020-03-06 | End: 2020-03-06

## 2020-03-06 RX ADMIN — CEFTRIAXONE 100 MILLIGRAM(S): 500 INJECTION, POWDER, FOR SOLUTION INTRAMUSCULAR; INTRAVENOUS at 18:43

## 2020-03-06 NOTE — ED PROVIDER NOTE - OBJECTIVE STATEMENT
58 y/o male hx of HIV undetectable, DVT, afib on asa and plavix now here for syncopal episode today in the street. Patient states he hasn't been feeling himself and passed out today on street witnessed by bystander with quick return to baseline. denies chest pain,nausea,vomiting,diarrhea,fevers,chills,sob. Looks well NAD stable.
pt with gastroenteritis, will give meds, IVF

## 2020-03-06 NOTE — ED PROVIDER NOTE - GASTROINTESTINAL, MLM
[facility] [address] 
 
Aliza Johnston PTA Time Calculation: 8 mins Abdomen soft, non-tender, no guarding.

## 2020-03-06 NOTE — ED ADULT TRIAGE NOTE - CHIEF COMPLAINT QUOTE
According to ems pt had a witnessed syncopal episode by PD. No signs of injury admits to feeling dizzy and +headache . PMHX asthma, dm, hiv+ htn and seizures

## 2020-03-06 NOTE — ED ADULT NURSE NOTE - OBJECTIVE STATEMENT
aox3, ambulatory presents s/p witnessed syncope at [address] [place] [address]. unsure of head trauma. denies physical complaints at this time. will cont to monitor. safety maintained

## 2020-03-06 NOTE — ED PROVIDER NOTE - PATIENT PORTAL LINK FT
You can access the FollowMyHealth Patient Portal offered by [facility] by registering at the following website: [email or web address]. By joining [employer]’s FollowMyHealth portal, you will also be able to view your health information using other applications (apps) compatible with our system.

## 2020-03-06 NOTE — ED PROVIDER NOTE - CLINICAL SUMMARY MEDICAL DECISION MAKING FREE TEXT BOX
60 y/o male hx of HIV, DVT on ASA and Plavix-compliant now here for brief syncopal episode today   Patient quick return to baseline states he has been feeling fatigued  now asymptomatic in ED  Denies SOB, chest pain, dizziness  Wells score 1.5 for PE low risk     UA: positive for UTI   will send abx for UTI

## 2020-05-05 ENCOUNTER — EMERGENCY (EMERGENCY)
Facility: HOSPITAL | Age: 60
LOS: 1 days | Discharge: HOME | End: 2020-05-05
Admitting: EMERGENCY MEDICINE
Payer: MEDICARE

## 2020-05-05 VITALS
OXYGEN SATURATION: 99 % | DIASTOLIC BLOOD PRESSURE: 77 MMHG | WEIGHT: 184.97 LBS | HEIGHT: 77 IN | SYSTOLIC BLOOD PRESSURE: 111 MMHG | TEMPERATURE: 99 F | RESPIRATION RATE: 18 BRPM | HEART RATE: 85 BPM

## 2020-05-05 DIAGNOSIS — Q43.3 CONGENITAL MALFORMATIONS OF INTESTINAL FIXATION: Chronic | ICD-10-CM

## 2020-05-05 PROCEDURE — L9991: CPT

## 2020-05-05 NOTE — ED ADULT TRIAGE NOTE - CHIEF COMPLAINT QUOTE
Pt complaining of sob since today. Pt has history of asthma and afib. Pt complaining of sob since today. Pt has history of asthma and afib. Pt speaking in full sentences.

## 2020-05-05 NOTE — ED ADULT NURSE REASSESSMENT NOTE - NS ED NURSE REASSESS COMMENT FT1
Pt refused assessment by MD, stated "I don't want to get corona and I want to leave." Pt left without being evaluated any further than triage.

## 2020-05-08 DIAGNOSIS — R06.02 SHORTNESS OF BREATH: ICD-10-CM

## 2020-07-23 NOTE — ED ADULT NURSE NOTE - NSIMPLEMENTINTERV_GEN_ALL_ED
(3) walks occasionally Implemented All Universal Safety Interventions:  [place] to call system. Call bell, personal items and telephone within reach. Instruct patient to call for assistance. Room bathroom lighting operational. Non-slip footwear when patient is off stretcher. Physically safe environment: no spills, clutter or unnecessary equipment. Stretcher in lowest position, wheels locked, appropriate side rails in place.

## 2020-08-24 ENCOUNTER — EMERGENCY (EMERGENCY)
Facility: HOSPITAL | Age: 60
LOS: 1 days | Discharge: HOME | End: 2020-08-24
Attending: EMERGENCY MEDICINE
Payer: MEDICARE

## 2020-08-24 VITALS
OXYGEN SATURATION: 100 % | DIASTOLIC BLOOD PRESSURE: 69 MMHG | HEIGHT: 78 IN | WEIGHT: 186.95 LBS | RESPIRATION RATE: 18 BRPM | TEMPERATURE: 98 F | SYSTOLIC BLOOD PRESSURE: 121 MMHG | HEART RATE: 70 BPM

## 2020-08-24 VITALS
OXYGEN SATURATION: 99 % | RESPIRATION RATE: 18 BRPM | HEART RATE: 74 BPM | SYSTOLIC BLOOD PRESSURE: 125 MMHG | DIASTOLIC BLOOD PRESSURE: 75 MMHG | TEMPERATURE: 98 F

## 2020-08-24 DIAGNOSIS — Q43.3 CONGENITAL MALFORMATIONS OF INTESTINAL FIXATION: Chronic | ICD-10-CM

## 2020-08-24 LAB
ALBUMIN SERPL ELPH-MCNC: 3.5 G/DL — SIGNIFICANT CHANGE UP (ref 3.5–5)
ALP SERPL-CCNC: 87 U/L — SIGNIFICANT CHANGE UP (ref 40–120)
ALT FLD-CCNC: 25 U/L DA — SIGNIFICANT CHANGE UP (ref 10–60)
ANION GAP SERPL CALC-SCNC: 5 MMOL/L — SIGNIFICANT CHANGE UP (ref 5–17)
APTT BLD: 28.8 SEC — SIGNIFICANT CHANGE UP (ref 27.5–35.5)
AST SERPL-CCNC: 17 U/L — SIGNIFICANT CHANGE UP (ref 10–40)
BASOPHILS # BLD AUTO: 0.02 K/UL — SIGNIFICANT CHANGE UP (ref 0–0.2)
BASOPHILS NFR BLD AUTO: 0.7 % — SIGNIFICANT CHANGE UP (ref 0–2)
BILIRUB SERPL-MCNC: 0.3 MG/DL — SIGNIFICANT CHANGE UP (ref 0.2–1.2)
BUN SERPL-MCNC: 11 MG/DL — SIGNIFICANT CHANGE UP (ref 7–18)
CALCIUM SERPL-MCNC: 8.8 MG/DL — SIGNIFICANT CHANGE UP (ref 8.4–10.5)
CHLORIDE SERPL-SCNC: 110 MMOL/L — HIGH (ref 96–108)
CO2 SERPL-SCNC: 26 MMOL/L — SIGNIFICANT CHANGE UP (ref 22–31)
CREAT SERPL-MCNC: 1 MG/DL — SIGNIFICANT CHANGE UP (ref 0.5–1.3)
EOSINOPHIL # BLD AUTO: 0.07 K/UL — SIGNIFICANT CHANGE UP (ref 0–0.5)
EOSINOPHIL NFR BLD AUTO: 2.4 % — SIGNIFICANT CHANGE UP (ref 0–6)
GLUCOSE SERPL-MCNC: 67 MG/DL — LOW (ref 70–99)
HCT VFR BLD CALC: 35.5 % — LOW (ref 39–50)
HGB BLD-MCNC: 11.6 G/DL — LOW (ref 13–17)
IMM GRANULOCYTES NFR BLD AUTO: 0 % — SIGNIFICANT CHANGE UP (ref 0–1.5)
INR BLD: 1 RATIO — SIGNIFICANT CHANGE UP (ref 0.88–1.16)
LYMPHOCYTES # BLD AUTO: 1.09 K/UL — SIGNIFICANT CHANGE UP (ref 1–3.3)
LYMPHOCYTES # BLD AUTO: 36.8 % — SIGNIFICANT CHANGE UP (ref 13–44)
MCHC RBC-ENTMCNC: 27.6 PG — SIGNIFICANT CHANGE UP (ref 27–34)
MCHC RBC-ENTMCNC: 32.7 GM/DL — SIGNIFICANT CHANGE UP (ref 32–36)
MCV RBC AUTO: 84.5 FL — SIGNIFICANT CHANGE UP (ref 80–100)
MONOCYTES # BLD AUTO: 0.44 K/UL — SIGNIFICANT CHANGE UP (ref 0–0.9)
MONOCYTES NFR BLD AUTO: 14.9 % — HIGH (ref 2–14)
NEUTROPHILS # BLD AUTO: 1.34 K/UL — LOW (ref 1.8–7.4)
NEUTROPHILS NFR BLD AUTO: 45.2 % — SIGNIFICANT CHANGE UP (ref 43–77)
NRBC # BLD: 0 /100 WBCS — SIGNIFICANT CHANGE UP (ref 0–0)
NT-PROBNP SERPL-SCNC: 24 PG/ML — SIGNIFICANT CHANGE UP (ref 0–125)
PLATELET # BLD AUTO: 148 K/UL — LOW (ref 150–400)
POTASSIUM SERPL-MCNC: 3.7 MMOL/L — SIGNIFICANT CHANGE UP (ref 3.5–5.3)
POTASSIUM SERPL-SCNC: 3.7 MMOL/L — SIGNIFICANT CHANGE UP (ref 3.5–5.3)
PROT SERPL-MCNC: 7.9 G/DL — SIGNIFICANT CHANGE UP (ref 6–8.3)
PROTHROM AB SERPL-ACNC: 11.7 SEC — SIGNIFICANT CHANGE UP (ref 10.6–13.6)
RBC # BLD: 4.2 M/UL — SIGNIFICANT CHANGE UP (ref 4.2–5.8)
RBC # FLD: 16.4 % — HIGH (ref 10.3–14.5)
SARS-COV-2 RNA SPEC QL NAA+PROBE: SIGNIFICANT CHANGE UP
SODIUM SERPL-SCNC: 141 MMOL/L — SIGNIFICANT CHANGE UP (ref 135–145)
TROPONIN I SERPL-MCNC: <0.015 NG/ML — SIGNIFICANT CHANGE UP (ref 0–0.04)
WBC # BLD: 2.96 K/UL — LOW (ref 3.8–10.5)
WBC # FLD AUTO: 2.96 K/UL — LOW (ref 3.8–10.5)

## 2020-08-24 PROCEDURE — 93005 ELECTROCARDIOGRAM TRACING: CPT

## 2020-08-24 PROCEDURE — 85379 FIBRIN DEGRADATION QUANT: CPT

## 2020-08-24 PROCEDURE — 85610 PROTHROMBIN TIME: CPT

## 2020-08-24 PROCEDURE — 80053 COMPREHEN METABOLIC PANEL: CPT

## 2020-08-24 PROCEDURE — 84484 ASSAY OF TROPONIN QUANT: CPT

## 2020-08-24 PROCEDURE — 87635 SARS-COV-2 COVID-19 AMP PRB: CPT

## 2020-08-24 PROCEDURE — 36415 COLL VENOUS BLD VENIPUNCTURE: CPT

## 2020-08-24 PROCEDURE — 71045 X-RAY EXAM CHEST 1 VIEW: CPT

## 2020-08-24 PROCEDURE — 82962 GLUCOSE BLOOD TEST: CPT

## 2020-08-24 PROCEDURE — 83880 ASSAY OF NATRIURETIC PEPTIDE: CPT

## 2020-08-24 PROCEDURE — 99283 EMERGENCY DEPT VISIT LOW MDM: CPT | Mod: 25

## 2020-08-24 PROCEDURE — 71045 X-RAY EXAM CHEST 1 VIEW: CPT | Mod: 26

## 2020-08-24 PROCEDURE — 99285 EMERGENCY DEPT VISIT HI MDM: CPT | Mod: 25

## 2020-08-24 PROCEDURE — 85730 THROMBOPLASTIN TIME PARTIAL: CPT

## 2020-08-24 PROCEDURE — 85027 COMPLETE CBC AUTOMATED: CPT

## 2020-08-24 NOTE — ED ADULT NURSE NOTE - OBJECTIVE STATEMENT
Patient complain of fever, chills, and weakness, lives in a hotel and has a roommate, unknown of covid positive of residents in hotel

## 2020-08-24 NOTE — ED PROVIDER NOTE - PATIENT PORTAL LINK FT
You can access the FollowMyHealth Patient Portal offered by [facility] by registering at the following website: [email or web address]. By joining [employer]’s FollowMyHealth portal, you will also be able to view your health information using other applications (apps) compatible with our system.

## 2020-08-24 NOTE — ED PROVIDER NOTE - OBJECTIVE STATEMENT
60 y/o male with PMHx HIV (on medication, last viral load 0), asthma, seizure, DVTs, on Plavix and Aspirin, presents for 1 week of chills and dry cough, chest tightness, shortness of breath today. Patient lives in homeless shelter/hotel and is unsure if he came in contact with other COVID positive patients. Denies other GI//neuro symptoms and any other acute complaints. NKDA.

## 2020-08-24 NOTE — ED PROVIDER NOTE - NSFOLLOWUPCLINICS_GEN_ALL_ED_FT
[facility]  [facility]  [address] - [address] [place] [address]  Phone: [phone]  Fax: [phone]  Follow Up Time:

## 2020-08-24 NOTE — ED PROVIDER NOTE - PROGRESS NOTE DETAILS
D-dimer mildly elevated. Attempted to obtain proper IV access for CTA but unsuccessful after 2-3 attempts. Discussed options for dg and treatment. Wants to home, doesn't want to attempt IV access again. Low suspicion for PE, likely Covid infection w false neg swab. Will DC w Covid resources, good return instructions provided, copies of labs provided

## 2020-08-24 NOTE — ED PROVIDER NOTE - NSFOLLOWUPINSTRUCTIONS_ED_ALL_ED_FT
Take Tylenol as needed for pains/fevers.  Drink plenty of fluids.  Follow up with your doctor or in the Clinic as discussed within 2 days.  Return to the ER for any concerns.

## 2020-08-30 ENCOUNTER — EMERGENCY (EMERGENCY)
Facility: HOSPITAL | Age: 60
LOS: 1 days | Discharge: HOME | End: 2020-08-30
Attending: EMERGENCY MEDICINE
Payer: MEDICARE

## 2020-08-30 VITALS
WEIGHT: 190.04 LBS | HEART RATE: 64 BPM | RESPIRATION RATE: 16 BRPM | SYSTOLIC BLOOD PRESSURE: 111 MMHG | OXYGEN SATURATION: 100 % | TEMPERATURE: 97 F | DIASTOLIC BLOOD PRESSURE: 65 MMHG | HEIGHT: 75 IN

## 2020-08-30 DIAGNOSIS — Q43.3 CONGENITAL MALFORMATIONS OF INTESTINAL FIXATION: Chronic | ICD-10-CM

## 2020-08-30 PROBLEM — Z78.9 OTHER SPECIFIED HEALTH STATUS: Chronic | Status: ACTIVE | Noted: 2020-08-24

## 2020-08-30 PROCEDURE — 94640 AIRWAY INHALATION TREATMENT: CPT

## 2020-08-30 PROCEDURE — 99281 EMR DPT VST MAYX REQ PHY/QHP: CPT

## 2020-08-30 RX ORDER — TERAZOSIN HYDROCHLORIDE 10 MG/1
1 CAPSULE ORAL
Qty: 30 | Refills: 0
Start: 2020-08-30 | End: 2020-09-28

## 2020-08-30 RX ORDER — FINASTERIDE 5 MG/1
1 TABLET, FILM COATED ORAL
Qty: 30 | Refills: 0
Start: 2020-08-30 | End: 2020-09-28

## 2020-08-30 RX ORDER — GABAPENTIN 400 MG/1
2 CAPSULE ORAL
Qty: 0 | Refills: 0 | DISCHARGE

## 2020-08-30 RX ORDER — ALBUTEROL 90 UG/1
2 AEROSOL, METERED ORAL ONCE
Refills: 0 | Status: COMPLETED | OUTPATIENT
Start: 2020-08-30 | End: 2020-08-30

## 2020-08-30 RX ORDER — DIAZEPAM 5 MG
1 TABLET ORAL
Qty: 0 | Refills: 0 | DISCHARGE

## 2020-08-30 RX ORDER — OXYBUTYNIN CHLORIDE 5 MG
1 TABLET ORAL
Qty: 0 | Refills: 0 | DISCHARGE

## 2020-08-30 RX ORDER — TOPIRAMATE 25 MG
2 TABLET ORAL
Qty: 120 | Refills: 0
Start: 2020-08-30 | End: 2020-09-28

## 2020-08-30 RX ORDER — ELVITEGRAVIR, COBICISTAT, EMTRICITABINE, AND TENOFOVIR ALAFENAMIDE 150; 150; 200; 10 MG/1; MG/1; MG/1; MG/1
1 TABLET ORAL
Qty: 0 | Refills: 0 | DISCHARGE

## 2020-08-30 RX ORDER — ATORVASTATIN CALCIUM 80 MG/1
1 TABLET, FILM COATED ORAL
Qty: 0 | Refills: 0 | DISCHARGE

## 2020-08-30 RX ORDER — ASPIRIN/CALCIUM CARB/MAGNESIUM 324 MG
1 TABLET ORAL
Qty: 0 | Refills: 0 | DISCHARGE

## 2020-08-30 RX ORDER — RISPERIDONE 4 MG/1
1 TABLET ORAL
Qty: 30 | Refills: 0
Start: 2020-08-30 | End: 2020-09-28

## 2020-08-30 RX ORDER — DIVALPROEX SODIUM 500 MG/1
1 TABLET, DELAYED RELEASE ORAL
Qty: 60 | Refills: 0
Start: 2020-08-30 | End: 2020-09-28

## 2020-08-30 RX ADMIN — ALBUTEROL 2 PUFF(S): 90 AEROSOL, METERED ORAL at 03:57

## 2020-08-30 NOTE — ED PROVIDER NOTE - PATIENT PORTAL LINK FT
You can access the FollowMyHealth Patient Portal offered by [facility] by registering at the following website: [email or web address]. By joining [employer]’s FollowMyHealth portal, you will also be able to view your health information using other applications (apps) compatible with our system.

## 2020-08-30 NOTE — ED ADULT TRIAGE NOTE - CHIEF COMPLAINT QUOTE
" I need all my medication, I ran out of my medications "  Denies contact with person known to have corona virus symptoms.  Patient states had COvid test done while in ED 2 weeks ago. " I need all my medication, I will ran out of my medications "  Denies contact with person known to have corona virus symptoms.  Patient states had COvid test done while in ED 2 weeks ago.

## 2020-08-30 NOTE — ED ADULT NURSE NOTE - CHIEF COMPLAINT QUOTE
" I need all my medication, I will ran out of my medications "  Denies contact with person known to have corona virus symptoms.  Patient states had COvid test done while in ED 2 weeks ago.

## 2020-08-30 NOTE — ED PROVIDER NOTE - CLINICAL SUMMARY MEDICAL DECISION MAKING FREE TEXT BOX
59 yr old male with hx of schioaffective d/o, afib, hiv, htn, hld, dm presents to ed requesting med refill. pt states he is about to run out and decided to come to ed for refill.  asking for terazosin 1mg BID, finasteride 5mg daily, Divalproex Dr 500mg q12, Topamax 50mg Q12 for anxiety, risperidal 2mg daily and albuterol pump    will give refill. educate about ed use. must see pcp

## 2020-08-30 NOTE — ED PROVIDER NOTE - OBJECTIVE STATEMENT
59 yr old male with hx of schioaffective d/o, afib, hiv, htn, hld, dm presents to ed requesting med refill. pt states he is about to run out and decided to come to ed for refill.  asking for terazosin 1mg BID, finasteride 5mg daily, Divalproex Dr 500mg q12, Topamax 50mg Q12 for anxiety, risperidal 2mg daily and albuterol pump

## 2020-10-30 NOTE — PROGRESS NOTE ADULT - PROBLEM SELECTOR PROBLEM 3
[FreeTextEntry1] : This note was written by Jennifer Robbins on 10/30/2020 acting as scribe for Dr. Luis Alfredo Kelley M.D.\par \par I, Dr. Luis Alfredo Kelley, have read and attest that all the information, medical decision making and discharge instructions within are true and accurate. 
Schizoaffective disorder, bipolar type
HIV (human immunodeficiency virus infection)
HIV (human immunodeficiency virus infection)
HTN (hypertension)
Hypertension, unspecified type
Paranoid schizophrenia
Schizoaffective disorder, bipolar type
Type 2 diabetes mellitus without complication, unspecified whether long term insulin use
Type 2 diabetes mellitus without complication, unspecified whether long term insulin use

## 2020-11-24 ENCOUNTER — EMERGENCY (EMERGENCY)
Facility: HOSPITAL | Age: 60
LOS: 1 days | Discharge: HOME | End: 2020-11-24
Admitting: EMERGENCY MEDICINE
Payer: MEDICARE

## 2020-11-24 VITALS
HEIGHT: 75 IN | RESPIRATION RATE: 18 BRPM | WEIGHT: 199.96 LBS | DIASTOLIC BLOOD PRESSURE: 68 MMHG | SYSTOLIC BLOOD PRESSURE: 134 MMHG | OXYGEN SATURATION: 99 % | TEMPERATURE: 98 F | HEART RATE: 78 BPM

## 2020-11-24 VITALS
TEMPERATURE: 98 F | SYSTOLIC BLOOD PRESSURE: 122 MMHG | OXYGEN SATURATION: 100 % | RESPIRATION RATE: 16 BRPM | DIASTOLIC BLOOD PRESSURE: 73 MMHG | HEART RATE: 50 BPM

## 2020-11-24 DIAGNOSIS — I10 ESSENTIAL (PRIMARY) HYPERTENSION: ICD-10-CM

## 2020-11-24 DIAGNOSIS — E11.9 TYPE 2 DIABETES MELLITUS WITHOUT COMPLICATIONS: ICD-10-CM

## 2020-11-24 DIAGNOSIS — B20 HUMAN IMMUNODEFICIENCY VIRUS [HIV] DISEASE: ICD-10-CM

## 2020-11-24 DIAGNOSIS — E78.5 HYPERLIPIDEMIA, UNSPECIFIED: ICD-10-CM

## 2020-11-24 DIAGNOSIS — R56.9 UNSPECIFIED CONVULSIONS: ICD-10-CM

## 2020-11-24 DIAGNOSIS — Z79.899 OTHER LONG TERM (CURRENT) DRUG THERAPY: ICD-10-CM

## 2020-11-24 DIAGNOSIS — Z20.828 CONTACT WITH AND (SUSPECTED) EXPOSURE TO OTHER VIRAL COMMUNICABLE DISEASES: ICD-10-CM

## 2020-11-24 DIAGNOSIS — F19.10 OTHER PSYCHOACTIVE SUBSTANCE ABUSE, UNCOMPLICATED: ICD-10-CM

## 2020-11-24 DIAGNOSIS — Z79.02 LONG TERM (CURRENT) USE OF ANTITHROMBOTICS/ANTIPLATELETS: ICD-10-CM

## 2020-11-24 DIAGNOSIS — Q43.3 CONGENITAL MALFORMATIONS OF INTESTINAL FIXATION: Chronic | ICD-10-CM

## 2020-11-24 DIAGNOSIS — R41.82 ALTERED MENTAL STATUS, UNSPECIFIED: ICD-10-CM

## 2020-11-24 DIAGNOSIS — Z79.84 LONG TERM (CURRENT) USE OF ORAL HYPOGLYCEMIC DRUGS: ICD-10-CM

## 2020-11-24 DIAGNOSIS — Z88.5 ALLERGY STATUS TO NARCOTIC AGENT: ICD-10-CM

## 2020-11-24 LAB
ALBUMIN SERPL ELPH-MCNC: 3.5 G/DL — SIGNIFICANT CHANGE UP (ref 3.4–5)
ALP SERPL-CCNC: 66 U/L — SIGNIFICANT CHANGE UP (ref 40–120)
ALT FLD-CCNC: 17 U/L — SIGNIFICANT CHANGE UP (ref 12–42)
AMPHET UR-MCNC: NEGATIVE — SIGNIFICANT CHANGE UP
ANION GAP SERPL CALC-SCNC: 8 MMOL/L — LOW (ref 9–16)
APPEARANCE UR: CLEAR — SIGNIFICANT CHANGE UP
APTT BLD: 26.5 SEC — LOW (ref 27.5–35.5)
AST SERPL-CCNC: 23 U/L — SIGNIFICANT CHANGE UP (ref 15–37)
BACTERIA # UR AUTO: PRESENT /HPF
BARBITURATES UR SCN-MCNC: NEGATIVE — SIGNIFICANT CHANGE UP
BASOPHILS # BLD AUTO: 0.01 K/UL — SIGNIFICANT CHANGE UP (ref 0–0.2)
BASOPHILS NFR BLD AUTO: 0.3 % — SIGNIFICANT CHANGE UP (ref 0–2)
BENZODIAZ UR-MCNC: POSITIVE
BILIRUB SERPL-MCNC: 0.3 MG/DL — SIGNIFICANT CHANGE UP (ref 0.2–1.2)
BILIRUB UR-MCNC: NEGATIVE — SIGNIFICANT CHANGE UP
BUN SERPL-MCNC: 20 MG/DL — SIGNIFICANT CHANGE UP (ref 7–23)
CALCIUM SERPL-MCNC: 9.2 MG/DL — SIGNIFICANT CHANGE UP (ref 8.5–10.5)
CHLORIDE SERPL-SCNC: 105 MMOL/L — SIGNIFICANT CHANGE UP (ref 96–108)
CO2 SERPL-SCNC: 26 MMOL/L — SIGNIFICANT CHANGE UP (ref 22–31)
COCAINE METAB.OTHER UR-MCNC: NEGATIVE — SIGNIFICANT CHANGE UP
COLOR SPEC: YELLOW — SIGNIFICANT CHANGE UP
COMMENT - URINE: SIGNIFICANT CHANGE UP
CREAT SERPL-MCNC: 0.93 MG/DL — SIGNIFICANT CHANGE UP (ref 0.5–1.3)
DIFF PNL FLD: NEGATIVE — SIGNIFICANT CHANGE UP
EOSINOPHIL # BLD AUTO: 0.04 K/UL — SIGNIFICANT CHANGE UP (ref 0–0.5)
EOSINOPHIL NFR BLD AUTO: 1 % — SIGNIFICANT CHANGE UP (ref 0–6)
EPI CELLS # UR: SIGNIFICANT CHANGE UP /HPF (ref 0–5)
ETHANOL SERPL-MCNC: <3 MG/DL — SIGNIFICANT CHANGE UP
GLUCOSE SERPL-MCNC: 155 MG/DL — HIGH (ref 70–99)
GLUCOSE UR QL: NEGATIVE — SIGNIFICANT CHANGE UP
HCT VFR BLD CALC: 36.1 % — LOW (ref 39–50)
HGB BLD-MCNC: 12.1 G/DL — LOW (ref 13–17)
IMM GRANULOCYTES NFR BLD AUTO: 0.3 % — SIGNIFICANT CHANGE UP (ref 0–1.5)
INR BLD: 1.02 — SIGNIFICANT CHANGE UP (ref 0.88–1.16)
KETONES UR-MCNC: NEGATIVE — SIGNIFICANT CHANGE UP
LACTATE SERPL-SCNC: 1.3 MMOL/L — SIGNIFICANT CHANGE UP (ref 0.4–2)
LEUKOCYTE ESTERASE UR-ACNC: NEGATIVE — SIGNIFICANT CHANGE UP
LIDOCAIN IGE QN: 80 U/L — SIGNIFICANT CHANGE UP (ref 73–393)
LYMPHOCYTES # BLD AUTO: 1.02 K/UL — SIGNIFICANT CHANGE UP (ref 1–3.3)
LYMPHOCYTES # BLD AUTO: 26 % — SIGNIFICANT CHANGE UP (ref 13–44)
MAGNESIUM SERPL-MCNC: 1.9 MG/DL — SIGNIFICANT CHANGE UP (ref 1.6–2.6)
MCHC RBC-ENTMCNC: 28.4 PG — SIGNIFICANT CHANGE UP (ref 27–34)
MCHC RBC-ENTMCNC: 33.5 GM/DL — SIGNIFICANT CHANGE UP (ref 32–36)
MCV RBC AUTO: 84.7 FL — SIGNIFICANT CHANGE UP (ref 80–100)
METHADONE UR-MCNC: NEGATIVE — SIGNIFICANT CHANGE UP
MONOCYTES # BLD AUTO: 0.38 K/UL — SIGNIFICANT CHANGE UP (ref 0–0.9)
MONOCYTES NFR BLD AUTO: 9.7 % — SIGNIFICANT CHANGE UP (ref 2–14)
NEUTROPHILS # BLD AUTO: 2.46 K/UL — SIGNIFICANT CHANGE UP (ref 1.8–7.4)
NEUTROPHILS NFR BLD AUTO: 62.7 % — SIGNIFICANT CHANGE UP (ref 43–77)
NITRITE UR-MCNC: POSITIVE
NRBC # BLD: 0 /100 WBCS — SIGNIFICANT CHANGE UP (ref 0–0)
NT-PROBNP SERPL-SCNC: 53 PG/ML — SIGNIFICANT CHANGE UP
OPIATES UR-MCNC: NEGATIVE — SIGNIFICANT CHANGE UP
PCO2 BLDV: 47 MMHG — SIGNIFICANT CHANGE UP (ref 41–51)
PCP SPEC-MCNC: SIGNIFICANT CHANGE UP
PCP UR-MCNC: NEGATIVE — SIGNIFICANT CHANGE UP
PH BLDV: 7.36 — SIGNIFICANT CHANGE UP (ref 7.32–7.43)
PH UR: 7 — SIGNIFICANT CHANGE UP (ref 5–8)
PLATELET # BLD AUTO: 144 K/UL — LOW (ref 150–400)
PO2 BLDV: 137 MMHG — HIGH (ref 35–40)
POTASSIUM SERPL-MCNC: 3.9 MMOL/L — SIGNIFICANT CHANGE UP (ref 3.5–5.3)
POTASSIUM SERPL-SCNC: 3.9 MMOL/L — SIGNIFICANT CHANGE UP (ref 3.5–5.3)
PROT SERPL-MCNC: 7.5 G/DL — SIGNIFICANT CHANGE UP (ref 6.4–8.2)
PROT UR-MCNC: NEGATIVE MG/DL — SIGNIFICANT CHANGE UP
PROTHROM AB SERPL-ACNC: 12.2 SEC — SIGNIFICANT CHANGE UP (ref 10.6–13.6)
RBC # BLD: 4.26 M/UL — SIGNIFICANT CHANGE UP (ref 4.2–5.8)
RBC # FLD: 18.2 % — HIGH (ref 10.3–14.5)
RBC CASTS # UR COMP ASSIST: < 5 /HPF — SIGNIFICANT CHANGE UP
SAO2 % BLDV: 98 % — SIGNIFICANT CHANGE UP
SARS-COV-2 RNA SPEC QL NAA+PROBE: SIGNIFICANT CHANGE UP
SODIUM SERPL-SCNC: 139 MMOL/L — SIGNIFICANT CHANGE UP (ref 132–145)
SP GR SPEC: 1.01 — SIGNIFICANT CHANGE UP (ref 1–1.03)
THC UR QL: NEGATIVE — SIGNIFICANT CHANGE UP
TROPONIN I SERPL-MCNC: <0.017 NG/ML — LOW (ref 0.02–0.06)
UROBILINOGEN FLD QL: 0.2 E.U./DL — SIGNIFICANT CHANGE UP
WBC # BLD: 3.92 K/UL — SIGNIFICANT CHANGE UP (ref 3.8–10.5)
WBC # FLD AUTO: 3.92 K/UL — SIGNIFICANT CHANGE UP (ref 3.8–10.5)
WBC UR QL: < 5 /HPF — SIGNIFICANT CHANGE UP

## 2020-11-24 PROCEDURE — 93010 ELECTROCARDIOGRAM REPORT: CPT

## 2020-11-24 PROCEDURE — 71045 X-RAY EXAM CHEST 1 VIEW: CPT | Mod: 26

## 2020-11-24 PROCEDURE — 70450 CT HEAD/BRAIN W/O DYE: CPT | Mod: 26

## 2020-11-24 PROCEDURE — 99285 EMERGENCY DEPT VISIT HI MDM: CPT | Mod: CS

## 2020-11-24 RX ORDER — CEFTRIAXONE 500 MG/1
1000 INJECTION, POWDER, FOR SOLUTION INTRAMUSCULAR; INTRAVENOUS ONCE
Refills: 0 | Status: COMPLETED | OUTPATIENT
Start: 2020-11-24 | End: 2020-11-24

## 2020-11-24 RX ORDER — AZITHROMYCIN 500 MG/1
500 TABLET, FILM COATED ORAL ONCE
Refills: 0 | Status: COMPLETED | OUTPATIENT
Start: 2020-11-24 | End: 2020-11-24

## 2020-11-24 RX ORDER — LEVETIRACETAM 250 MG/1
1000 TABLET, FILM COATED ORAL ONCE
Refills: 0 | Status: COMPLETED | OUTPATIENT
Start: 2020-11-24 | End: 2020-11-24

## 2020-11-24 RX ADMIN — CEFTRIAXONE 100 MILLIGRAM(S): 500 INJECTION, POWDER, FOR SOLUTION INTRAMUSCULAR; INTRAVENOUS at 16:28

## 2020-11-24 RX ADMIN — AZITHROMYCIN 500 MILLIGRAM(S): 500 TABLET, FILM COATED ORAL at 16:28

## 2020-11-24 RX ADMIN — LEVETIRACETAM 440 MILLIGRAM(S): 250 TABLET, FILM COATED ORAL at 14:30

## 2020-11-24 NOTE — ED PROVIDER NOTE - PROGRESS NOTE DETAILS
Patient now verbal. Sleeping in stretcher. attempted to stand and could not support himself. Patient has his of crack/cocaine abuse suspect drug abuse. Will attempt to get urine and continue to observe. Patient appears well sleeping protecting airway utox positive for benzo,  pt monitored in the ED with improved mental status, AFVSS, currently ambulatory with steady gait, tolerating PO without N/V, clear speech, without additional medical complaints noted.  Repeat exam and neuro/cranial nerve exams wnl.  No evidence of head/neck trauma. Pt feels much better and safe for discharge. Counseling provided. Medically stable for d/c.

## 2020-11-24 NOTE — ED PROVIDER NOTE - PMH
Afib    Allergy    Bipolar 1 disorder    Deep vein thrombosis of both lower extremities    DM (diabetes mellitus)    DM (diabetes mellitus)    HIV (human immunodeficiency virus infection)    HIV disease    HLD (hyperlipidemia)    HTN (hypertension)    No pertinent past medical history    PAF (paroxysmal atrial fibrillation)    Paranoid schizophrenia    Schizophrenia

## 2020-11-24 NOTE — ED PROVIDER NOTE - CARE PLAN
Principal Discharge DX:	Drug abuse   Principal Discharge DX:	Drug abuse  Secondary Diagnosis:	Altered mental status

## 2020-11-24 NOTE — ED ADULT NURSE NOTE - OBJECTIVE STATEMENT
patient for SEizure; found on street; unsure if patient took any drugs or ETOH; no obvious trauma noted; wakes up to painful stimuli but falls back to sleep

## 2020-11-24 NOTE — ED PROVIDER NOTE - CHPI ED SYMPTOMS NEG
no numbness/no nausea/no weakness/no changes in vision, no chills, no abdominal pain, no CP, no SOB./no vomiting/no confusion/no dizziness/no fever

## 2020-11-24 NOTE — ED PROVIDER NOTE - OBJECTIVE STATEMENT
59 y/o male with PMHx HIV (on medication, last viral load 0), asthma, seizure, DVTs, on Plavix and Aspirin, is BIB EMS for questionable seizure. As per EMS, pt was answering questions during the seizure. Duration of the seizure was 20 mins, pt was given 10mg Versed IM. . Pt states he is currently homeless, and has a questionable drug/etoh history. Pt arrives moving all extremities, lethargic, with reactive pupils and no obvious sings of trauma. On arrival, pt is wearing a wrist band and evidence of having had his blood drawn at other sites. Pt denies fever, chills, changes in vision, dizziness, weakness, CP, SOB, abdominal pain, nausea, vomiting, diarrhea or any other medical symptoms.

## 2020-11-24 NOTE — ED ADULT NURSE NOTE - PRO INTERPRETER NEED 2
Mailed letter to patient notifying her of overdue labs CBC with Differential with Platelet Ordered 82/11/79 and due 11/22/18 . Overdue letter mailed to patient.
English

## 2020-11-24 NOTE — ED ADULT TRIAGE NOTE - CHIEF COMPLAINT QUOTE
as per EMS patient was having a seizure but answering questions at the same time, duration 20mins, pt was given 10mg Versed IM - pt with h/o seizure-

## 2020-11-24 NOTE — ED PROVIDER NOTE - PATIENT PORTAL LINK FT
You can access the FollowMyHealth Patient Portal offered by [facility] by registering at the following website: [email or web address]. By joining [employer]’s FollowMyHealth portal, you will also be able to view your health information using other applications (apps) compatible with our system. You can access the FollowMyHealth Patient Portal offered by [facility] by registering at the following website: [email or web address]. By joining [employer]’s FollowMyHealth portal, you will also be able to view your health information using other applications (apps) compatible with our system.

## 2020-11-24 NOTE — ED PROVIDER NOTE - CLINICAL SUMMARY MEDICAL DECISION MAKING FREE TEXT BOX
59 y/o male is BIB EMS for seizure. Administer Keppra, obtain head CT to r/o head trauma, and complete infectious workup.

## 2020-11-25 NOTE — ED ADULT NURSE NOTE - NS ED NOTE ABUSE RESPONSE YN
ANTICOAGULATION  MANAGEMENT    Caty Ng is being discharged from the [facility] ([facility]).    Reason for discharge: Patient moved and has established care in [place]    Anticoagulation episode resolved and INR Standing order discontinued    If patient needs warfarin management in the future, please send a new referral     Debo Zuniga RN - [facility]           Yes

## 2021-11-12 NOTE — ED ADULT NURSE NOTE - NSSISCREENINGQ2_ED_A_ED
APRN VEN[facility] COLES    Blaire Mena is a 65 y.o. female who presents to [facility] 11/12/21 to undergo an elective left carotid to subclavian bypass per Dr. Leiva.     Patient has a PMH of heavy tobacco use (2 PPD), dyslipidemia, and anxiety / depression. There is no COVID-19 vaccination information on file.     She initially presented to her local cardiologist in September of this year for evaluation for a 3-4 month history of left arm numbness. CTA of the neck was performed and revealed an occluded left subclavian artery. She was referred to Dr. Leiva who discussed treatment options, with the patient ultimately electing to undergo surgical revascularization.      Today Ms. Mena underwent an elective left carotid endarterectomy with left carotid to subclavian bypass per Dr. Leiva, and was transferred to ICU postoperatively for continued medical management.     INTENSIVIST   HOSPITAL VISIT NOTE     Hospital:  LOS: 0 days        S     Blaire, 65 y.o. female is followed for:No chief complaint on file.       Left subclavian artery occlusion    Anxiety and depression    Tobacco use    Dyslipidemia    As an Intensivist, we provide an integrated approach to the ICU patient and family, medical management of comorbid conditions, including but not limited to electrolytes, glycemic control, organ dysfunction, lead interdisciplinary rounds and coordinate the care with all other services, including those from other specialists.     HPI:  Blaire is a 65 y.o. female, who presented to this Hospital on 11/12/2021 because of an occluded left Subclavian artery, and to undergo surgical revascularization.    POD: Day of Surgery     I saw her in 2H ICU doing well.  Pain well controlled.  No dyspnea.  On Nicardipine infusion.    The patient qualifies to receive the vaccine, but they have not yet received it.      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   PSxH: She  has a past surgical history that includes Hysterectomy; Appendectomy; Knee surgery (Bilateral); Femur fracture surgery; Tibia fracture surgery; Fibula Fracture Surgery; Septoplasty; and Teeth Extraction.     Medications:  No current facility-administered medications on file prior to encounter.     Current Outpatient Medications on File Prior to Encounter   Medication Sig   • aspirin (aspirin) 81 MG EC tablet Take 1 tablet by mouth Daily.   • alendronate (FOSAMAX) 70 MG tablet Take 70 mg by mouth Every 7 (Seven) Days.   • atorvastatin (LIPITOR) 40 MG tablet Take 1 tablet by mouth Every Night.   • vitamin B-12 (CYANOCOBALAMIN) 100 MCG tablet Take 50 mcg by mouth Daily.        Allergies: She is allergic to sulfa antibiotics.   FH: Her family history includes Aneurysm in her father; Diabetes in her mother; Heart disease in her father and mother; Hyperlipidemia in her father and mother; Hypertension in her father and mother.   SH: She  reports that she has been smoking cigarettes. She has a 82.00 pack-year smoking history. She has never used smokeless tobacco. Drug use questions deferred to the physician. She reports that she does not drink alcohol.     The patient's relevant past medical, surgical and social history were reviewed and updated in Epic as appropriate.        History     Last Reviewed by Lorne Denise MD on 11/12/2021 at  3:35 PM    Sections Reviewed    Medical, Family, Surgical, Tobacco, Alcohol, Drug Use, Sexual Activity,   Social Documentation      Problem list reviewed by Lorne Denise MD on 11/12/2021 at  3:35 PM  Medicines reviewed by Lorne Denise MD on 11/12/2021 at  3:35 PM  Allergies reviewed by Lorne Denise MD on 11/12/2021 at  3:35 PM       Review of Systems  Constitutional: Positive for malaise/fatigue. Negative for chills,  fever, night sweats and weight loss.   HENT: Negative for hearing loss, odynophagia and sore throat.    Cardiovascular: Positive for dyspnea on exertion. Negative for chest pain, leg swelling, orthopnea and palpitations.   Respiratory: Negative for cough and shortness of breath.    Hematologic/Lymphatic: Does not bruise/bleed easily.   Skin: Negative for itching and rash.   Musculoskeletal: Positive for falls. Negative for arthritis, joint pain, joint swelling and myalgias.   Gastrointestinal: Negative for abdominal pain, constipation, diarrhea, hematemesis, hematochezia, nausea and vomiting.   Genitourinary: Negative for dysuria, frequency and hematuria.   Neurological: Positive for loss of balance. Negative for focal weakness, headaches, numbness and seizures.   Rest of all systems reviewed and negative.       O     Vitals:  Temp: 98 °F (36.7 °C) (11/12/21 1400) Temp  Min: 97 °F (36.1 °C)  Max: 98.1 °F (36.7 °C)   Temp core:      BP: 111/62 (11/12/21 1400) BP  Min: 80/56  Max: 160/73   Pulse: 78 (11/12/21 1400) Pulse  Min: 62  Max: 93   Resp: 16 (11/12/21 1400) Resp  Min: 10  Max: 20   SpO2: 92 % (11/12/21 1400) SpO2  Min: 91 %  Max: 98 %   Device: nasal cannula, humidified (11/12/21 1400)    Flow Rate: 3 (11/12/21 1400) Flow (L/min)  Min: 3  Max: 6     Intake/Ouptut 24 hrs (7:00AM - 6:59 AM)    Intake/Output Summary (Last 24 hours) at 11/12/2021 1537  Last data filed at 11/12/2021 0831  Gross per 24 hour   Intake 600 ml   Output 25 ml   Net 575 ml        Medications (drips):  lactated ringers  niCARdipine, Last Rate: 5 mg/hr (11/12/21 1428)  sodium chloride, Last Rate: 75 mL/hr (11/12/21 1428)        Physical Examination    Telemetry:  Rhythm: normal sinus rhythm (11/12/21 1400)         Constitutional:  No acute distress.   Head: Normocephalic.   ENMT: No oral lesions.   Neck: No adenopathy. Supple.  Trachea midline.   Cardiovascular: RRR.   Normal heart sounds.  No murmurs, gallop or rub.   Respiratory: Normal  breath sounds  No adventitious sounds.   Abdominal:  Soft with no tenderness.  No distension.   No HSM.   Extremities: Warm.  Dry.  No cyanosis.  No Edema   Neurological/Psych:   Alert, Oriented, Cooperative.  Best Eye Response: (P) 4-->(E4) spontaneous (11/12/21 1500)  Best Motor Response: (P) 6-->(M6) obeys commands (11/12/21 1500)  Best Verbal Response: (P) 5-->(V5) oriented (11/12/21 1500)  [place] Coma Scale Score: (P) 15 (11/12/21 1500)     Results Reviewed:  Laboratory  Microbiology  Radiology  Pathology    Hematology:  Results from last 7 days   Lab Units 11/10/21  1409   WBC 10*3/mm3 7.62   HEMOGLOBIN g/dL 13.8   PLATELETS 10*3/mm3 292     Chemistry:  Estimated Creatinine Clearance: 105.6 mL/min (A) (by C-G formula based on SCr of 0.5 mg/dL (L)).  Results from last 7 days   Lab Units 11/10/21  1409   SODIUM mmol/L 137   POTASSIUM mmol/L 4.6   CHLORIDE mmol/L 103   CO2 mmol/L 24.0   BUN mg/dL 13   CREATININE mg/dL 0.50*   GLUCOSE mg/dL 102*     Results from last 7 days   Lab Units 11/10/21  1409   CALCIUM mg/dL 9.0     COVID-19  Lab Results   Lab Value Date/Time    COVID19 Not Detected 11/10/2021 1409     Images:  EEG Monitoring Nonintracranial Surgery    Result Date: 11/12/2021  Uneventful EEG monitoring during left CEA This report is transcribed using the Dragon dictation system.      EEG (Pre-Op)    Result Date: 11/12/2021  This study is adequate for intraoperative monitoring This report is transcribed using the Dragon dictation system.      Echo:  Results for orders placed during the hospital encounter of 09/22/21    Adult Transthoracic Echo Complete W/ Cont if Necessary Per Protocol    Interpretation Summary  · Left ventricular wall thickness is consistent with mild concentric hypertrophy.  · Calculated left ventricular 3D EF = 56% Left ventricular ejection fraction appears to be 61 - 65%. Left ventricular systolic function is normal.  · Left ventricular diastolic function is consistent with (grade I)  impaired relaxation.  · Estimated right ventricular systolic pressure from tricuspid regurgitation is normal (<35 mmHg).      Results: Reviewed.  I reviewed the patient's new laboratory and imaging results.  I independently reviewed the patient's new images.    Medications: Reviewed.    Assessment/Plan   A / P     Blaire, is a 65 y.o. female admitted on 11/12/2021 with Left subclavian artery occlusion [I70.8]:     1. Left Subclavian artery occlusion  Procedure(s) (LRB):  CAROTID SUBCLAVIAN BYPASS LEFT (N/A)   Dr. Leiva    2. Dyslipidemia on statin  3. COPD no exacerbation  1. Tobacco use  4. Anxiety and Depression  5. Antibiotics: Cefuroxime periop.  6. No h/o DM    Results from last 7 days   Lab Units 11/12/21  1407   GLUCOSE mg/dL 130     Lab Results   Lab Value Date/Time    HGBA1C 5.20 11/10/2021 1409       Advance Directives: There are no questions and answers to display.        Plan:    1. ICU post operative medical management.  2. Hemodynamic management. Adequate preload.  3. Urinary Output > = 0.5 mL/kg/hr  4. Watch for arrhythmias.  5. Watch for bleeding.   6. BP control with Nicardipine infusion   7. Disposition: Admit to ICU     Plan of care and goals reviewed during interdisciplinary rounds.  I discussed the patient's findings and my recommendations with patient and nursing staff    Level of Risk is High due to:  illness with threat to life or bodily function.     []  Primary Attending  [x]  Consultant       No

## 2022-01-10 NOTE — ED ADULT NURSE NOTE - DISCHARGE DATE/TIME
Thanks for coming today.  [facility]  [address] [place] [address]    To schedule future appointments in Ortho Clinic, you may call [phone].    To schedule ordered imaging by your Provider: Call [facility] at [phone]    C2C Link available online at:   [email or web address]    Please call if any further questions or concerns [phone] and ask for the Orthopedic Department. Clinic hours 8 am to 5 pm.    Return to clinic if symptoms worsen.   10-May-2019 19:17

## 2022-04-25 NOTE — PATIENT PROFILE BEHAVIORAL HEALTH - NSBHLEGALSTATUS_PSY_A_CORE
EGD with RFA Ablation  INSTRUCTIONS     Re: Chastity Mele   [address] [facility] [address]    Provider: Mg Grijalva MD    Your exam is scheduled as an outpatient procedure on:     Day: Friday    Date: Anne 3, 2022    Arrival Time: 10:15 AM (You will receive a confirmation message 3 days before your appointment, if you do not receive a message or have questions, visit the patient portal for details.). Be aware your procedure time is subject to change.)     You will be receiving sedation for your procedure and MUST have an adult over 18 to drive you home and be with you after procedure.     Location: [address] [address]- Enter through the main entrance.             Your COVID test has been order at:    [facility] [id number] [place] [address];  You will need to call  [phone] from the parking lot to check in when you arrive for your COVID test.  Appointment: Tuesday 5/31/2022 at 10:40am .              You will be completing your COVID test 48-72 hours before the procedure depending on location.  Once you complete your COVID test you will be encouraged to self-quarantine to minimize risk of exposure, but if you can't, we recommend you wear a mask, social distance and practice good hand-washing hygiene prior to your procedure.      7 days before: Review your instructions. (Instructions can also be found on "[employer]hart under the letters tab under communication)    3 days before:Stop taking all anti-inflammatory medicines. These include: Advil, Aleve, Naprosyn, (Tylenol is okay to take)    1 day before:   · Eat normal diet throughout the day   At MIDNIGHT start a strict clear liquid diet    A clear liquid diet can include: Apple juice, white grape juice, and white cranberry juice; Beef or chicken broths that are clear – no noodles, vegetables, rice, etc.Tea and coffee without milk; -Soda pop, Gatorade, Rick-Aid and various  -Jell-O Flavors (any color except red or purple)  •Avoid: juices with pulp, milk, cream, solid food, alcohol, Gum, and hard candy.    • Take your medications; levothyroxine , triamterene-hydroCHLOROthiazide (MAXZIDE-25) with a sip of water 4 hours prior to your arrival time. STOP ALL LIQUIDS INCLUDING WATER AT THIS TIME.                 9.13 (Voluntary)

## 2022-05-14 NOTE — ED BEHAVIORAL HEALTH ASSESSMENT NOTE - NICOTINE
During mouth care noted white patches on tongue. Does not complain of .discmfort. NP Demarcus notified by supervisor orders obtained. See MAR. None known

## 2022-09-20 NOTE — PROGRESS NOTE BEHAVIORAL HEALTH - AFFECT QUALITY
Euthymic
Euthymic
Taltz Counseling: I discussed with the patient the risks of ixekizumab including but not limited to immunosuppression, serious infections, worsening of inflammatory bowel disease and drug reactions.  The patient understands that monitoring is required including a PPD at baseline and must alert us or the primary physician if symptoms of infection or other concerning signs are noted.
Euthymic
Euthymic
Euthymic/Anxious
Euthymic
Anxious/Euthymic
Euthymic
Anxious/Euthymic
Euthymic
Anxious/Euthymic
Euthymic
Euthymic
Anxious/Euthymic
Euthymic
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
## 2023-11-17 NOTE — ED ADULT NURSE NOTE - NSSISCREENINGQ5_ED_A_ED
Routine ob at 36.2 wks.  Feeling well.  Good FM.  Normal growth (85%) yesterday, vertex.  GBS done.  SVE 1.5/70/-2, vertex.  Labor precautions.  RTC 1 wk.   
No

## 2023-12-11 NOTE — BEHAVIORAL HEALTH ASSESSMENT NOTE - NSBHCHARTREVIEWVS_PSY_A_CORE FT
[facility] ED NOTE    Patient : Lisa Melgar Age: 33 year old Sex: male   MRN: [id number] Encounter Date: 12/11/2023    History of Present Illness      Chief Complaint   Patient presents with    Abdominal Pain     Patient is a 33-year-old male who presents for evaluation of epigastric abdominal pain.  Patient states this pain has been present over the past 2 days.  Patient states he woke up Saturday morning with pain in the upper abdomen.  Patient states the pain has been intensifying after eating.  Patient has had mild nausea.  No vomiting.  No diarrhea or constipation.  No past abdominal surgeries.  Patient states he has previously been diagnosed with Crohn's disease in 2016.  Patient states he has had a screening EGDs and colonoscopies but his disease has been essentially \"nonexistent\".  His GI specialist is Dr. Julio C Proctor out of [facility].  No recent antibiotics.  No blood in stool.  Patient states he took an antacid yesterday which did not help with symptoms.  Patient states he ate earlier today and had intense pain for approximately 2 hours.  Patient states he took a Pepcid earlier today and his symptoms improved.           Review of Systems   Constitutional:         All other systems reviewed and are negative unless mentioned in HPI.       Past Medical History     No Known Allergies    Past Medical History:   Diagnosis Date    Crohn's disease (CMD)        Past Surgical History:   Procedure Laterality Date    NO PAST SURGERIES         No family history on file.    Social History     Tobacco Use    Smoking status: Never    Smokeless tobacco: Never   Substance Use Topics    Alcohol use: Yes     Comment: occasional    Drug use: Never       Physical Exam     ED Triage Vitals [12/11/23 1303]   ED Triage Vitals Group      Temp 98 °F (36.7 °C)      Heart Rate 77      Resp 16      /74      SpO2 98 %      EtCO2 mmHg       Height 5' 11\" (1.803 m)      Weight 172 lb 9.9 oz (78.3  kg)      Weight Scale Used Standing scale      BMI (Calculated) 24.08      IBW/kg (Calculated) 75.3     Pulse oximetry reviewed and is normal for patient.      Physical Exam  Vitals and nursing note reviewed.   Constitutional:       Appearance: Normal appearance.   HENT:      Head: Normocephalic and atraumatic.      Right Ear: External ear normal.      Left Ear: External ear normal.      Nose: Nose normal.      Mouth/Throat:      Mouth: Mucous membranes are moist.      Pharynx: Oropharynx is clear.      Neck: Normal range of motion and neck supple.   Eyes:      Extraocular Movements: Extraocular movements intact.      Conjunctiva/sclera: Conjunctivae normal.   Cardiovascular:      Rate and Rhythm: Normal rate and regular rhythm.      Pulses: Normal pulses.      Heart sounds: Normal heart sounds.   Pulmonary:      Effort: Pulmonary effort is normal. No respiratory distress.      Breath sounds: Normal breath sounds. No wheezing.   Abdominal:      General: Abdomen is flat. Bowel sounds are normal.      Palpations: Abdomen is soft.      Tenderness: There is abdominal tenderness in the epigastric area. There is no right CVA tenderness, left CVA tenderness, guarding or rebound. Negative signs include Delgado's sign, Rovsing's sign, McBurney's sign, psoas sign and obturator sign.   Musculoskeletal:         General: Normal range of motion.   Skin:     General: Skin is warm and dry.      Capillary Refill: Capillary refill takes less than 2 seconds.      Coloration: Skin is not jaundiced.   Neurological:      General: No focal deficit present.      Mental Status: He is alert.   Psychiatric:         Mood and Affect: Mood normal.         Behavior: Behavior normal.         Thought Content: Thought content normal.         ED Course          Procedures    ED Medication Orders (From admission, onward)      Ordered Start     Status Ordering Provider    12/11/23 1351 12/11/23 1400  alum-mag hydroxide+simethicone/lidocaine viscous  (2:1) (MAGIC MOUTHWASH/GI COCKTAIL) (compounded) oral suspension 15 mL  ONCE         Last MAR action: Given KING JOSEPH    12/11/23 1351 12/11/23 1400  pantoprazole (PROTONIX INJECT) injection 40 mg  ONCE         Last MAR action: Given KING JOSEPH            Vitals:    12/11/23 1303 12/11/23 1459 12/11/23 1500 12/11/23 1515   BP: 115/74 118/77 129/76 134/89   BP Location:  LUE - Left upper extremity     Patient Position:  Semi-Harrington's     Pulse: 77 78  72   Resp: 16 16  18   Temp: 98 °F (36.7 °C)      SpO2: 98% 99% 100% 99%   Weight: 78.3 kg (172 lb 9.9 oz)      Height: 5' 11\" (1.803 m)          Lab Results     Results for orders placed or performed during the hospital encounter of 12/11/23   Comprehensive Metabolic Panel   Result Value Ref Range    Fasting Status      Sodium 140 135 - 145 mmol/L    Potassium 4.0 3.4 - 5.1 mmol/L    Chloride 101 97 - 110 mmol/L    Carbon Dioxide 27 21 - 32 mmol/L    Anion Gap 16 7 - 19 mmol/L    Glucose 82 70 - 99 mg/dL    BUN 16 6 - 20 mg/dL    Creatinine 0.98 0.67 - 1.17 mg/dL    Glomerular Filtration Rate >90 >=60    BUN/Cr 16 7 - 25    Calcium 9.7 8.4 - 10.2 mg/dL    Bilirubin, Total 1.1 (H) 0.2 - 1.0 mg/dL    GOT/AST 93 (H) <=37 Units/L    GPT/ (H) <64 Units/L    Alkaline Phosphatase 183 (H) 45 - 117 Units/L    Albumin 4.3 3.6 - 5.1 g/dL    Protein, Total 8.2 6.4 - 8.2 g/dL    Globulin 3.9 2.0 - 4.0 g/dL    A/G Ratio 1.1 1.0 - 2.4   Lipase   Result Value Ref Range    Lipase 28 15 - 77 Units/L   CBC with Automated Differential (performable only)   Result Value Ref Range    WBC 8.1 4.2 - 11.0 K/mcL    RBC 5.04 4.50 - 5.90 mil/mcL    HGB 15.2 13.0 - 17.0 g/dL    HCT 45.0 39.0 - 51.0 %    MCV 89.3 78.0 - 100.0 fl    MCH 30.2 26.0 - 34.0 pg    MCHC 33.8 32.0 - 36.5 g/dL    RDW-CV 12.5 11.0 - 15.0 %    RDW-SD 41.0 39.0 - 50.0 fL     140 - 450 K/mcL    NRBC 0 <=0 /100 WBC    Neutrophil, Percent 67 %    Lymphocytes, Percent 19 %    Mono, Percent 8 %     Eosinophils, Percent 5 %    Basophils, Percent 1 %    Immature Granulocytes 0 %    Absolute Neutrophils 5.5 1.8 - 7.7 K/mcL    Absolute Lymphocytes 1.6 1.0 - 4.8 K/mcL    Absolute Monocytes 0.6 0.3 - 0.9 K/mcL    Absolute Eosinophils  0.4 0.0 - 0.5 K/mcL    Absolute Basophils 0.0 0.0 - 0.3 K/mcL    Absolute Immature Granulocytes 0.0 0.0 - 0.2 K/mcL       EKG Results         Radiology Results     Imaging Results              CT ABDOMEN PELVIS W CONTRAST (Final result)  Result time 12/11/23 14:50:33      Final result                   Impression:    Impression:  1. No CT evidence of bowel obstruction.  No CT evidence of acute appendicitis.  No CT evidence of acute diverticulitis.  2. No CT evidence of acute pancreatitis.  3. Mild fatty infiltration of the liver.  No suspicious hepatic mass.  4. No hydronephrosis.  No CT evidence of acute pyelonephritis.    Electronically Signed by: TERRA CHAU MD   Signed on: 12/11/2023 2:50 PM   Workstation ID: [id number]               Narrative:    EXAM: CT ABDOMEN PELVIS W CONTRAST    CLINICAL INDICATION:  Upper abd pain    COMPARISON: No prior exams are available for comparison.    TECHNIQUE:  CT scan of the abdomen and pelvis is obtained utilizing intravenous  contrast, no oral contrast as requested. Coronal reformatted images were  obtained.   Adjustment of the mA and or kV was done based on the patient's size.    FINDINGS:  Sections through the base of the chest demonstrate normal heart base and  pericardial base.   There is no pleural fluid collection.    Sections through the abdomen and pelvis demonstrate mild diffuse fatty  infiltration of the liver.  No suspicious hepatic mass is noted.  No calcified gallstones are noted in the gallbladder on the CT exam.  There is no splenic abnormality.  Pancreas appears unremarkable.  There is no adrenal mass.  There is no hydronephrosis.  There is no CT evidence of pyelonephritis.  Bladder appears unremarkable.  Prostate gland is  top normal.  Stomach is not distended. Small bowel loops are not distended.  Large bowel loops are not distended. There is no evidence of bowel  obstruction.  Appendix has normal diameter. There is no CT evidence of acute  appendicitis.  There is no CT evidence of acute diverticulitis.  No inflammatory changes are noted in the abdominal and pelvic fat.   There is no extraluminal fluid collection in the abdomen or pelvis.  No abdominal aortic aneurysm is noted.   No enlarged lymph nodes are noted in the abdomen or pelvis.                                        Medical Decision Making      Medical Decision Making   Abdominal pain, Appendicitis, bowel obstruction, renal stone, ureteral stone, biliary colic, cholecystitis, hepatitis, pancreatitis, urinary tract infection, pyelonephritis, gastroesophageal reflux disease, gastritis, ischemic bowel, diverticulitis.     Patient is a 33-year-old male with history and PE as above.  Patient presents with epigastric abdominal pain worse after eating over the past 2 days.  Pain was very intense prior to arrival.  Patient took Pepcid earlier today and pain has been improving since.  Patient's vitals are stable.  Patient has no leukocytosis or left shift.  Patient does have mildly elevated bilirubin and mild transaminitis with ALT at 173 and AST at 93.  Patient was given Protonix and GI cocktail.  CT scan of abdomen pelvis does not show any evidence of obstruction.  No diverticulitis, pancreatitis, or appendicitis.  Normal-appearing gallbladder.  On reevaluation patient has no symptoms currently.  Patient denies any nausea.  No abdominal pain.  I suspect patient's symptoms may be secondary to gastritis.  Patient given copy of imaging.  Patient updated on his transaminitis.  Patient discharged home with Rx for Protonix and Carafate.  Advised patient to follow-up with his GI specialist for repeat exam in 2 to 3 days to return for any new or worsening symptoms.    I explained to the  patient that in the emergency department evaluation is not exhaustive it is important to follow-up with a primary care physician or specialist as discussed, and to return to the emergency department if symptoms are not improving or are worsening.  The patient verbalized understanding of these follow-up instructions and return precautions.        Limitations to history/exam/care: none  Co-morbidities impacting care:  Crohns  Social factors impacting care: none known   External records reviewed: none available  Tests considered but not performed: N/A  Consults:      The patient was masked, and I was wearing a LPR mask, gloves, and face shield during entire patient encounter.    Clinical Impression     ED Diagnosis   1. Epigastric pain            Disposition        Discharge 12/11/2023  3:41 PM  Lisa Melgar discharge to home/self care.          Rudi Luis PA-C   12/11/2023 1:46 PM            Rudi Luis PA-C  12/11/23 1601     ICU Vital Signs Last 24 Hrs  T(C): 35.6 (24 Sep 2018 09:13), Max: 36.6 (24 Sep 2018 06:11)  T(F): 96.1 (24 Sep 2018 09:13), Max: 97.8 (24 Sep 2018 06:11)  HR: 101 (24 Sep 2018 09:13) (62 - 101)  BP: 130/80 (24 Sep 2018 09:13) (111/83 - 130/80)  BP(mean): --  ABP: --  ABP(mean): --  RR: 17 (24 Sep 2018 09:13) (17 - 20)  SpO2: --

## 2023-12-26 NOTE — ED ADULT NURSE NOTE - CAS DISCH TRANSFER METHOD
Patient notified via [facility]:  Parathyroid hormone is higher.  Creatinine stable at 1.0.  Vitamin D low but improved.  Anemic.  Iron stores are not low.  Continue current medications.  Urine protein is not increased.  Keep follow-up appointment and we will review these results with you again at that time.        No new labs before the next visit.   Transportation service

## 2024-04-03 NOTE — ED ADULT NURSE NOTE - NSIMPLEMENTINTERV_GEN_ALL_ED
We can see her virtually tonight at 6:00 p.m.  They need to activate the portal   Implemented All Universal Safety Interventions:  [place]rost to call system. Call bell, personal items and telephone within reach. Instruct patient to call for assistance. Room bathroom lighting operational. Non-slip footwear when patient is off stretcher. Physically safe environment: no spills, clutter or unnecessary equipment. Stretcher in lowest position, wheels locked, appropriate side rails in place.

## 2024-04-27 NOTE — ED ADULT NURSE NOTE - NSFALLRSKPASTHIST_ED_ALL_ED
no [id number]: previous_biopsy_has_been_previously_biopsied Has The Growth Been Previously Biopsied?: has been previously biopsied

## 2025-04-04 NOTE — ED ADULT NURSE NOTE - RN DISCHARGE SIGNATURE
04/04/25 8:50 AM     Chart reviewed for   Cervical Cancer Screening    ; nothing is submitted to the patient's insurance at this time.     MOJGAN AYON MA   [facility]   05-Sep-2018

## 2025-04-21 NOTE — ED ADULT NURSE NOTE - PMH
Render In Strict Bullet Format?: No Samples Given: [place] Detail Level: Zone Afib    Allergy    Bipolar 1 disorder    Deep vein thrombosis of both lower extremities    DM (diabetes mellitus)    DM (diabetes mellitus)    HIV (human immunodeficiency virus infection)    HIV disease    HLD (hyperlipidemia)    HTN (hypertension)    PAF (paroxysmal atrial fibrillation)    Paranoid schizophrenia    Schizophrenia

## 2025-07-08 NOTE — PATIENT PROFILE BEHAVIORAL HEALTH - NSBHTHTPROCESS_PSY_A_CORE
Chart reviewed. Patient has not been seen in over two years and has no follow-up visit scheduled with the provider. Patient must schedule an appointment with provider JONATHON Santo in order for this medication to be addressed. If patient does not schedule appointment, PCP to address. Routed to schedule pool to see if patient would like to schedule a re-new MW appointment.     Medication(s) Requested: Liraglutide  Last office visit: 10/24/2023   Next office visit: NONE   goal directed